# Patient Record
Sex: FEMALE | Race: WHITE | NOT HISPANIC OR LATINO | Employment: FULL TIME | URBAN - METROPOLITAN AREA
[De-identification: names, ages, dates, MRNs, and addresses within clinical notes are randomized per-mention and may not be internally consistent; named-entity substitution may affect disease eponyms.]

---

## 2017-06-12 ENCOUNTER — APPOINTMENT (OUTPATIENT)
Dept: LAB | Facility: CLINIC | Age: 52
End: 2017-06-12
Attending: EMERGENCY MEDICINE

## 2017-06-12 ENCOUNTER — TRANSCRIBE ORDERS (OUTPATIENT)
Dept: URGENT CARE | Facility: CLINIC | Age: 52
End: 2017-06-12

## 2017-06-12 DIAGNOSIS — Z02.1 PHYSICAL EXAM, PRE-EMPLOYMENT: ICD-10-CM

## 2017-06-12 DIAGNOSIS — Z02.1 PHYSICAL EXAM, PRE-EMPLOYMENT: Primary | ICD-10-CM

## 2017-06-12 LAB — RUBV IGG SERPL IA-ACNC: >175 IU/ML

## 2017-06-12 PROCEDURE — 86787 VARICELLA-ZOSTER ANTIBODY: CPT

## 2017-06-12 PROCEDURE — 86765 RUBEOLA ANTIBODY: CPT

## 2017-06-12 PROCEDURE — 86762 RUBELLA ANTIBODY: CPT

## 2017-06-12 PROCEDURE — 86480 TB TEST CELL IMMUN MEASURE: CPT

## 2017-06-12 PROCEDURE — 36415 COLL VENOUS BLD VENIPUNCTURE: CPT

## 2017-06-12 PROCEDURE — 86706 HEP B SURFACE ANTIBODY: CPT

## 2017-06-12 PROCEDURE — 86735 MUMPS ANTIBODY: CPT

## 2017-06-13 LAB
HBV SURFACE AB SER-ACNC: <3.1 MIU/ML
MEV IGG SER QL: NORMAL
MUV IGG SER QL: NORMAL
VZV IGG SER IA-ACNC: NORMAL

## 2017-06-14 LAB
ANNOTATION COMMENT IMP: NORMAL
GAMMA INTERFERON BACKGROUND BLD IA-ACNC: 0.05 IU/ML
M TB IFN-G BLD-IMP: NEGATIVE
M TB IFN-G CD4+ BCKGRND COR BLD-ACNC: 0.14 IU/ML
M TB IFN-G CD4+ T-CELLS BLD-ACNC: 0.19 IU/ML
MITOGEN IGNF BLD-ACNC: 8.53 IU/ML
QUANTIFERON-TB GOLD IN TUBE: NORMAL
SERVICE CMNT-IMP: NORMAL

## 2018-03-09 ENCOUNTER — OFFICE VISIT (OUTPATIENT)
Dept: INTERNAL MEDICINE CLINIC | Facility: CLINIC | Age: 53
End: 2018-03-09
Payer: COMMERCIAL

## 2018-03-09 VITALS
BODY MASS INDEX: 31.22 KG/M2 | TEMPERATURE: 98 F | RESPIRATION RATE: 16 BRPM | SYSTOLIC BLOOD PRESSURE: 120 MMHG | WEIGHT: 210.8 LBS | HEIGHT: 69 IN | HEART RATE: 59 BPM | OXYGEN SATURATION: 98 % | DIASTOLIC BLOOD PRESSURE: 62 MMHG

## 2018-03-09 DIAGNOSIS — M25.60 STIFFNESS OF MULTIPLE JOINTS: ICD-10-CM

## 2018-03-09 DIAGNOSIS — E78.49 OTHER HYPERLIPIDEMIA: Primary | ICD-10-CM

## 2018-03-09 DIAGNOSIS — E89.0 POSTOPERATIVE HYPOTHYROIDISM: ICD-10-CM

## 2018-03-09 DIAGNOSIS — F31.9 BIPOLAR 1 DISORDER (HCC): ICD-10-CM

## 2018-03-09 DIAGNOSIS — Z12.4 SCREENING FOR CERVICAL CANCER: ICD-10-CM

## 2018-03-09 DIAGNOSIS — F31.9 BIPOLAR 1 DISORDER (HCC): Primary | ICD-10-CM

## 2018-03-09 DIAGNOSIS — Z12.11 SCREENING FOR COLON CANCER: ICD-10-CM

## 2018-03-09 DIAGNOSIS — Z12.39 SCREENING FOR BREAST CANCER: ICD-10-CM

## 2018-03-09 PROCEDURE — 99204 OFFICE O/P NEW MOD 45 MIN: CPT | Performed by: NURSE PRACTITIONER

## 2018-03-09 RX ORDER — LAMOTRIGINE 100 MG/1
100 TABLET ORAL
COMMUNITY
End: 2018-04-04 | Stop reason: SDUPTHER

## 2018-03-09 RX ORDER — LEVOTHYROXINE SODIUM 0.1 MG/1
100 TABLET ORAL DAILY
Qty: 30 TABLET | Refills: 1 | Status: SHIPPED | OUTPATIENT
Start: 2018-03-09 | End: 2018-06-21 | Stop reason: SDUPTHER

## 2018-03-09 NOTE — PROGRESS NOTES
Assessment/Plan: Patient to have fasting labs done along with RF and JULEE  She was given a referral for GYN and mammogram   Patient is deferring a colonoscopy but is will to do the FIT testing  Will call Synthroid brand dose into [facility]  Will refer patient to Psychiatry for her refills on her Lamictal for her Bipolar disorder  She is up to date on her eye exams and dental exams  She did have a flu vaccine this flu season  Will follow up in 3 months or sooner  If any issues or concerns please call the office  No problem-specific Assessment & Plan notes found for this encounter  Problem List Items Addressed This Visit     Other hyperlipidemia - Primary    Relevant Orders    CBC and differential    Comprehensive metabolic panel    Lipid panel    Postoperative hypothyroidism    Relevant Medications    levothyroxine (SYNTHROID) 100 mcg tablet    Other Relevant Orders    CBC and differential    Comprehensive metabolic panel    TSH, 3rd generation with T4 reflex    Bipolar 1 disorder (HCC)    Relevant Orders    CBC and differential    Comprehensive metabolic panel    Stiffness of multiple joints    Relevant Orders    CBC and differential    Comprehensive metabolic panel    JULEE Screen w/ Reflex to Titer/Pattern    Rheumatoid Factor      Other Visit Diagnoses     Screening for breast cancer        Relevant Orders    Mammo screening bilateral w 3d & cad    Screening for cervical cancer        Relevant Orders    Ambulatory referral to Obstetrics / Gynecology    Screening for colon cancer        Relevant Orders    Occult Bloood,Fecal Immunochemical            Subjective:      Patient ID: Rosa Chavarria is a 46 y o  female  Sebastian Pantoja is here today to establish care as a new patient  Her last PCO was Dr Sammi Nazario and she was last seen one year ago by him  She states she does have a history of Bipolar disorder and does take Lamictal at bedtime    She did have a total thyroidectomy several years ago and does take Synthroid 100 mcg by mouth daily but does not take the generic only the brand version  She also does have a history of a partial hyster, rotator cuff surgery, hyperlipidemia, and arthritis  She denies any chest pain, SOB, or palpitations  She does work at the hospital as a house keeper [employer]  She is having issues today with pain in her hands and wrists and states they will swell and as the day goes on does get worse with pain  She right now is wearing a brace to her right wrist to help alleviate the pain  She does take Aleve at night which does help her symptoms  She does need a referral for a mammogram, GYN and FIT testing  She is deferring a colonoscopy  She was a previous smoker but has since quit several years ago  She denies any depression or anxiety and states the Lamictal does help her moods  She last had her vision check several years ago and did have Lasik surgery  She is sleeping well at night and denies any other symptoms  She offers no other complaints  The following portions of the patient's history were reviewed and updated as appropriate:   She  has a past medical history of Bipolar 1 disorder ([facility] [address] ); Disease of thyroid gland; and Thyroid cancer ([facility] [address] )  She   Patient Active Problem List    Diagnosis Date Noted    Other hyperlipidemia 03/09/2018    Postoperative hypothyroidism 03/09/2018    Bipolar 1 disorder ([facility] [address] ) 03/09/2018    Stiffness of multiple joints 03/09/2018     She  has a past surgical history that includes Shoulder surgery; Bladder neck reconstruction; Thyroidectomy, partial; Hysterectomy; Bladder suspension; and Rotator cuff repair (Bilateral)  Her family history is not on file  She  reports that she has quit smoking  Her smoking use included Cigarettes  She smoked 1 00 pack per day  She has never used smokeless tobacco  She reports that she does not drink alcohol or use drugs    Current Outpatient Prescriptions   Medication Sig Dispense Refill    lamoTRIgine (LaMICtal) 100 mg tablet Take 100 mg by mouth      levothyroxine (SYNTHROID) 100 mcg tablet Take 1 tablet (100 mcg total) by mouth daily 30 tablet 1     No current facility-administered medications for this visit  Current Outpatient Prescriptions on File Prior to Visit   Medication Sig    [DISCONTINUED] levothyroxine 100 mcg tablet Take 100 mcg by mouth daily    [DISCONTINUED] albuterol (PROVENTIL HFA,VENTOLIN HFA) 90 mcg/act inhaler Inhale 2 puffs every 4 (four) hours as needed for wheezing    [DISCONTINUED] azithromycin (ZITHROMAX) 250 mg tablet Take 1 tablet by mouth daily Take first 2 tablets together, then 1 every day until finished   [DISCONTINUED] fexofenadine (ALLEGRA) 180 MG tablet Take 180 mg by mouth daily   [DISCONTINUED] lamoTRIgine (LaMICtal) 100 mg tablet Take 150 mg by mouth 2 (two) times a day Indications: 150mg in am 100mg in pm     [DISCONTINUED] levothyroxine 100 mcg tablet Take 100 mcg by mouth daily   [DISCONTINUED] naproxen (NAPROSYN) 500 mg tablet Take 1 tablet by mouth 2 (two) times a day with meals     No current facility-administered medications on file prior to visit  She is allergic to demerol [meperidine]; demerol [meperidine]; and latex       Review of Systems   Constitutional: Negative  HENT: Negative  Eyes: Negative  Respiratory: Negative  Cardiovascular: Negative  Gastrointestinal: Negative  Endocrine: Negative  Genitourinary: Negative  Musculoskeletal: Positive for arthralgias, joint swelling and myalgias  Skin: Negative  Allergic/Immunologic: Negative  Neurological: Negative  Hematological: Negative  Psychiatric/Behavioral: Negative            Objective:      /62 (BP Location: Right arm, Patient Position: Sitting, Cuff Size: Large)   Pulse 59   Temp 98 °F (36 7 °C) (Oral)   Resp 16   Ht 5' 9" (1 753 m)   Wt 95 6 kg (210 lb 12 8 oz)   SpO2 98%   BMI 31 13 kg/m²          Physical Exam Constitutional: She is oriented to person, place, and time  She appears well-developed and well-nourished  HENT:   Head: Normocephalic and atraumatic  Right Ear: External ear normal    Left Ear: External ear normal    Nose: Nose normal    Mouth/Throat: Oropharynx is clear and moist    Eyes: Conjunctivae and EOM are normal  Pupils are equal, round, and reactive to light  Neck: Normal range of motion  Neck supple  Cardiovascular: Normal rate, regular rhythm, normal heart sounds and intact distal pulses  Pulmonary/Chest: Effort normal and breath sounds normal    Abdominal: Soft  Bowel sounds are normal    Musculoskeletal: Normal range of motion  Neurological: She is alert and oriented to person, place, and time  She has normal reflexes  Skin: Skin is warm and dry  Psychiatric: She has a normal mood and affect  Her behavior is normal  Judgment and thought content normal    Vitals reviewed

## 2018-03-09 NOTE — PATIENT INSTRUCTIONS
Arthritis   WHAT YOU NEED TO KNOW:   What is arthritis? Arthritis is a disease that causes inflammation in one or more joints  There are many types of arthritis, such as osteoarthritis, rheumatoid arthritis, and septic arthritis  Some types cause inflammation in the joints  Other types wear away the cartilage between joints  This makes the bones of the joint rub together when you move the joint  Your symptoms may be constant, or symptoms may come and go  Arthritis often gets worse over time and can cause permanent joint damage  What increases my risk for arthritis? · A family history of arthritis    · Infection, trauma, or injury to the joint    · Obesity    · A disease such as diabetes, heart disease, hypothyroidism, or psoriasis    · An immune deficiency disorder, such as AIDS or lupus  What are the signs and symptoms of arthritis? · Pain, swelling, or stiffness in the joint    · Limited range of motion in the joint    · Warmth or redness over the joint    · Tenderness when you touch the joint    · Stiff joints in the morning that loosen with movement    · A creaking or grinding sound when you move the joint    · Fever  How is arthritis diagnosed? · Blood tests  are used to measure the amount of inflammation in your body  · An x-ray, CT, MRI, or ultrasound  may be used to check for joint damage, swelling, or loss of bone  Do not enter the MRI room with anything metal  Metal can cause serious damage  Tell the healthcare provider if you have any metal in or on your body  · A sample  of the fluid in the joint may be tested for uric acid or calcium crystals, or for signs of infection  How is arthritis treated? Treatment will depend on the type of arthritis you have and if it is severe  You may need any of the following:  · Acetaminophen  decreases pain and fever  It is available without a doctor's order  Ask how much to take and how often to take it  Follow directions   Acetaminophen can cause liver damage if not taken correctly  · NSAIDs , such as ibuprofen, help decrease swelling, pain, and fever  This medicine is available with or without a doctor's order  NSAIDs can cause stomach bleeding or kidney problems in certain people  If you take blood thinner medicine, always ask your healthcare provider if NSAIDs are safe for you  Always read the medicine label and follow directions  · Steroid medicine  helps reduce swelling and pain  · Surgery  may be needed to repair or replace a damaged joint  What can I do to manage arthritis? · Rest your painful joint so it can heal   Your healthcare provider may recommend crutches or a walker if the affected joint is in a leg  · Apply ice or heat to the joint  Both can help decrease swelling and pain  Ice may also help prevent tissue damage  Use an ice pack, or put crushed ice in a plastic bag  Cover it with a towel and place it on your joint for 15 to 20 minutes every hour or as directed  You can apply heat for 20 minutes every 2 hours  Heat treatment includes hot packs or heat lamps  · Elevate your joint  Elevation helps reduce swelling and pain  Raise your joint above the level of your heart as often as you can  Prop your painful joint on pillows to keep it above your heart comfortably  · Go to therapy as directed  A physical therapist can teach you exercises to improve flexibility and range of motion  You may also be shown non-weight-bearing exercises that are safe for your joints, such as swimming  Exercise can help keep your joints flexible and reduce pain  An occupational therapist can help you learn to do your daily activities when your joints are stiff or sore  · Maintain a healthy weight  Extra weight puts increased pressure on your joints  Ask your healthcare provider what you should weigh   If you need to lose weight, he can help you create a weight loss program  Weight loss can help reduce pain and increase your ability to do your activities  The amount of exercise you do may vary each day, depending on your symptoms  · Wear flat or low-heeled shoes  This will help decrease pain and reduce pressure on your ankle, knee, and hip joints  · Use support devices  You may be given splints to wear on your hands to help your joints rest and to decrease inflammation  While you sleep, use a pillow that is firm enough to support your neck and head  What other equipment should I use? The following may help you move and prevent falls:  · Orthotic shoes or insoles  help support your feet when you walk  · Crutches, a cane, or a walker  may help decrease your risk for falling  They also decrease stress on affected joints  · Devices to prevent falls  include raised toilet seats and bathtub bars to help you get up from sitting  Handrails can be placed in areas where you need balance and support  When should I seek immediate care? · You have a fever and severe joint pain or swelling  · You cannot move the affected joint  · You have severe joint pain you cannot tolerate  When should I contact my healthcare provider? · Your pain or swelling does not get better with treatment  · You have questions or concerns about your condition or care  CARE AGREEMENT:   You have the right to help plan your care  Learn about your health condition and how it may be treated  Discuss treatment options with your caregivers to decide what care you want to receive  You always have the right to refuse treatment  The above information is an  only  It is not intended as medical advice for individual conditions or treatments  Talk to your doctor, nurse or pharmacist before following any medical regimen to see if it is safe and effective for you  © 2017 [employer] Information is for End User's use only and may not be sold, redistributed or otherwise used for commercial purposes   All illustrations and images included in CareNotes® are the copyrighted property of A D A M , Inc  or Yifan Yadav

## 2018-03-12 ENCOUNTER — TELEPHONE (OUTPATIENT)
Dept: INTERNAL MEDICINE CLINIC | Facility: CLINIC | Age: 53
End: 2018-03-12

## 2018-03-17 ENCOUNTER — LAB (OUTPATIENT)
Dept: LAB | Facility: HOSPITAL | Age: 53
End: 2018-03-17
Payer: COMMERCIAL

## 2018-03-17 DIAGNOSIS — E78.49 OTHER HYPERLIPIDEMIA: ICD-10-CM

## 2018-03-17 DIAGNOSIS — F31.9 BIPOLAR 1 DISORDER (HCC): ICD-10-CM

## 2018-03-17 DIAGNOSIS — M25.60 STIFFNESS OF MULTIPLE JOINTS: ICD-10-CM

## 2018-03-17 DIAGNOSIS — E89.0 POSTOPERATIVE HYPOTHYROIDISM: ICD-10-CM

## 2018-03-17 LAB
ALBUMIN SERPL BCP-MCNC: 4 G/DL (ref 3.5–5)
ALP SERPL-CCNC: 62 U/L (ref 46–116)
ALT SERPL W P-5'-P-CCNC: 30 U/L (ref 12–78)
ANION GAP SERPL CALCULATED.3IONS-SCNC: 11 MMOL/L (ref 4–13)
AST SERPL W P-5'-P-CCNC: 19 U/L (ref 5–45)
BASOPHILS # BLD AUTO: 0.06 THOUSANDS/ΜL (ref 0–0.1)
BASOPHILS NFR BLD AUTO: 1 % (ref 0–1)
BILIRUB SERPL-MCNC: 0.6 MG/DL (ref 0.2–1)
BUN SERPL-MCNC: 13 MG/DL (ref 5–25)
CALCIUM SERPL-MCNC: 9.3 MG/DL (ref 8.3–10.1)
CHLORIDE SERPL-SCNC: 105 MMOL/L (ref 100–108)
CHOLEST SERPL-MCNC: 159 MG/DL (ref 50–200)
CO2 SERPL-SCNC: 23 MMOL/L (ref 21–32)
CREAT SERPL-MCNC: 0.68 MG/DL (ref 0.6–1.3)
EOSINOPHIL # BLD AUTO: 0.27 THOUSAND/ΜL (ref 0–0.61)
EOSINOPHIL NFR BLD AUTO: 5 % (ref 0–6)
ERYTHROCYTE [DISTWIDTH] IN BLOOD BY AUTOMATED COUNT: 12.9 % (ref 11.6–15.1)
GFR SERPL CREATININE-BSD FRML MDRD: 99 ML/MIN/1.73SQ M
GLUCOSE P FAST SERPL-MCNC: 94 MG/DL (ref 65–99)
HCT VFR BLD AUTO: 40.7 % (ref 34.8–46.1)
HDLC SERPL-MCNC: 62 MG/DL (ref 40–60)
HGB BLD-MCNC: 14.1 G/DL (ref 11.5–15.4)
LDLC SERPL CALC-MCNC: 84 MG/DL (ref 0–100)
LYMPHOCYTES # BLD AUTO: 1.88 THOUSANDS/ΜL (ref 0.6–4.47)
LYMPHOCYTES NFR BLD AUTO: 37 % (ref 14–44)
MCH RBC QN AUTO: 30.1 PG (ref 26.8–34.3)
MCHC RBC AUTO-ENTMCNC: 34.6 G/DL (ref 31.4–37.4)
MCV RBC AUTO: 87 FL (ref 82–98)
MONOCYTES # BLD AUTO: 0.38 THOUSAND/ΜL (ref 0.17–1.22)
MONOCYTES NFR BLD AUTO: 8 % (ref 4–12)
NEUTROPHILS # BLD AUTO: 2.46 THOUSANDS/ΜL (ref 1.85–7.62)
NEUTS SEG NFR BLD AUTO: 49 % (ref 43–75)
PLATELET # BLD AUTO: 288 THOUSANDS/UL (ref 149–390)
PMV BLD AUTO: 9.3 FL (ref 8.9–12.7)
POTASSIUM SERPL-SCNC: 4.2 MMOL/L (ref 3.5–5.3)
PROT SERPL-MCNC: 6.5 G/DL (ref 6.4–8.2)
RBC # BLD AUTO: 4.68 MILLION/UL (ref 3.81–5.12)
SODIUM SERPL-SCNC: 139 MMOL/L (ref 136–145)
TRIGL SERPL-MCNC: 64 MG/DL
TSH SERPL DL<=0.05 MIU/L-ACNC: 1.96 UIU/ML (ref 0.36–3.74)
WBC # BLD AUTO: 5.05 THOUSAND/UL (ref 4.31–10.16)

## 2018-03-17 PROCEDURE — 36415 COLL VENOUS BLD VENIPUNCTURE: CPT

## 2018-03-17 PROCEDURE — 85025 COMPLETE CBC W/AUTO DIFF WBC: CPT

## 2018-03-17 PROCEDURE — 80053 COMPREHEN METABOLIC PANEL: CPT

## 2018-03-17 PROCEDURE — 86038 ANTINUCLEAR ANTIBODIES: CPT

## 2018-03-17 PROCEDURE — 86430 RHEUMATOID FACTOR TEST QUAL: CPT

## 2018-03-17 PROCEDURE — 84443 ASSAY THYROID STIM HORMONE: CPT

## 2018-03-17 PROCEDURE — 80061 LIPID PANEL: CPT

## 2018-03-19 LAB
RHEUMATOID FACT SER QL LA: NEGATIVE
RYE IGE QN: NEGATIVE

## 2018-03-19 NOTE — PROGRESS NOTES
Can you let Marysol Seek know her blood work did come back normal her rheumatoid factor was negative as well as her other testing

## 2018-04-04 DIAGNOSIS — F31.9 BIPOLAR 1 DISORDER (HCC): Primary | ICD-10-CM

## 2018-04-04 RX ORDER — LAMOTRIGINE 100 MG/1
100 TABLET ORAL
Qty: 90 TABLET | Refills: 0 | Status: SHIPPED | OUTPATIENT
Start: 2018-04-04 | End: 2018-04-23 | Stop reason: SDUPTHER

## 2018-04-12 ENCOUNTER — HOSPITAL ENCOUNTER (OUTPATIENT)
Dept: RADIOLOGY | Facility: HOSPITAL | Age: 53
Discharge: HOME | End: 2018-04-12
Payer: COMMERCIAL

## 2018-04-12 DIAGNOSIS — M25.531 WRIST PAIN, ACUTE, RIGHT: ICD-10-CM

## 2018-04-12 DIAGNOSIS — M25.531 WRIST PAIN, ACUTE, RIGHT: Primary | ICD-10-CM

## 2018-04-12 PROCEDURE — 73110 X-RAY EXAM OF WRIST: CPT

## 2018-04-23 DIAGNOSIS — F31.9 BIPOLAR 1 DISORDER (HCC): ICD-10-CM

## 2018-04-23 RX ORDER — LAMOTRIGINE 100 MG/1
100 TABLET ORAL
Qty: 30 TABLET | Refills: 0 | Status: SHIPPED | OUTPATIENT
Start: 2018-04-23 | End: 2018-04-23 | Stop reason: SDUPTHER

## 2018-04-23 RX ORDER — LAMOTRIGINE 100 MG/1
TABLET ORAL
Qty: 30 TABLET | Refills: 0 | Status: SHIPPED | OUTPATIENT
Start: 2018-04-23 | End: 2018-06-27 | Stop reason: SDUPTHER

## 2018-05-13 ENCOUNTER — HOSPITAL ENCOUNTER (EMERGENCY)
Facility: HOSPITAL | Age: 53
Discharge: HOME | End: 2018-05-13
Attending: EMERGENCY MEDICINE
Payer: OTHER MISCELLANEOUS

## 2018-05-13 VITALS
WEIGHT: 210.76 LBS | SYSTOLIC BLOOD PRESSURE: 120 MMHG | DIASTOLIC BLOOD PRESSURE: 57 MMHG | HEIGHT: 69 IN | TEMPERATURE: 98.1 F | BODY MASS INDEX: 31.22 KG/M2 | RESPIRATION RATE: 18 BRPM | HEART RATE: 60 BPM | OXYGEN SATURATION: 97 %

## 2018-05-13 DIAGNOSIS — S80.12XA: Primary | ICD-10-CM

## 2018-05-13 DIAGNOSIS — S80.12XA TRAUMATIC HEMATOMA OF LEFT LOWER LEG: ICD-10-CM

## 2018-05-13 PROCEDURE — 99283 EMERGENCY DEPT VISIT LOW MDM: CPT

## 2018-05-13 NOTE — ED PROVIDER NOTES
History  Chief Complaint   Patient presents with    Leg Injury     Fire extinguisher fell off the wall on 5th floor [facility] and stuck patients left leg at 1130, pain, bruising, swelling     Pt while working on 5th floor ( )  Was washing hands and the fire extinquisher was knocked off  The wall and hit lateral left thigh, then upper lateral calf and then side of left foot  Occurred about 11:30  Pt know more painful at thigh area and increased bruising at calf  No numbness/tingling or loss of function  Pt is ambulating without asst or distress  No other areas of injury /pain  History provided by:  Patient  Leg Pain   Location:  Hip and leg  Injury: yes    Leg location:  L upper leg and L lower leg  Pain details:     Quality:  Aching and throbbing    Progression:  Worsening  Chronicity:  New  Prior injury to area:  No  Relieved by:  None tried  Associated symptoms: no back pain, no decreased ROM, no fatigue, no fever, no itching, no muscle weakness, no neck pain, no numbness and no tingling    Risk factors: no concern for non-accidental trauma and no recent illness        Prior to Admission Medications   Prescriptions Last Dose Informant Patient Reported? Taking?   lamoTRIgine (LaMICtal) 100 mg tablet   No No   Sig: Take one and one half tablet at bedtime   levothyroxine (SYNTHROID) 100 mcg tablet   No No   Sig: Take 1 tablet (100 mcg total) by mouth daily      Facility-Administered Medications: None       Past Medical History:   Diagnosis Date    Bipolar 1 disorder ([facility] [address] )     Disease of thyroid gland     Thyroid cancer ([facility] [address] )        Past Surgical History:   Procedure Laterality Date    BLADDER NECK RECONSTRUCTION      BLADDER SUSPENSION      HYSTERECTOMY      ROTATOR CUFF REPAIR Bilateral     done twice    SHOULDER SURGERY      THYROIDECTOMY, PARTIAL         History reviewed  No pertinent family history  I have reviewed and agree with the history as documented      Social History   Substance Use Topics    Smoking status: Former Smoker     Packs/day: 1 00     Types: Cigarettes    Smokeless tobacco: Never Used    Alcohol use No        Review of Systems   Constitutional: Negative for activity change, appetite change, chills, fatigue and fever  HENT: Negative  Negative for trouble swallowing and voice change  Eyes: Negative  Respiratory: Negative  Negative for chest tightness and shortness of breath  Cardiovascular: Negative  Negative for chest pain  Gastrointestinal: Negative  Negative for abdominal pain, nausea and vomiting  Genitourinary: Negative  Negative for dysuria, flank pain, hematuria, pelvic pain and urgency  Musculoskeletal: Negative for back pain, joint swelling, neck pain and neck stiffness  Skin: Negative for itching, pallor and rash  Neurological: Negative  Negative for dizziness, tremors, syncope, weakness, light-headedness, numbness and headaches  Psychiatric/Behavioral: Negative  All other systems reviewed and are negative  Physical Exam  ED Triage Vitals [05/13/18 1338]   Temperature Pulse Respirations Blood Pressure SpO2   98 1 °F (36 7 °C) 60 18 120/57 97 %      Temp Source Heart Rate Source Patient Position - Orthostatic VS BP Location FiO2 (%)   Temporal Monitor Sitting Right arm --      Pain Score       2           Orthostatic Vital Signs  Vitals:    05/13/18 1338   BP: 120/57   Pulse: 60   Patient Position - Orthostatic VS: Sitting       Physical Exam   Constitutional: She is oriented to person, place, and time  She appears well-developed and well-nourished  She is active and cooperative  Non-toxic appearance  She does not have a sickly appearance  She does not appear ill  No distress  HENT:   Head: Normocephalic and atraumatic  Head is without raccoon's eyes, without Barrera's sign, without abrasion and without contusion     Right Ear: Hearing normal    Left Ear: Hearing normal    Mouth/Throat: Oropharynx is clear and moist  Mucous membranes are not dry and not cyanotic  No posterior oropharyngeal edema or posterior oropharyngeal erythema  Eyes: Conjunctivae and EOM are normal  Pupils are equal, round, and reactive to light  Neck: Normal range of motion  Neck supple  No JVD present  No spinous process tenderness and no muscular tenderness present  Normal range of motion present  Cardiovascular: Normal rate, regular rhythm, intact distal pulses and normal pulses  No extrasystoles are present  No perf edema or calf tenderness   Pulmonary/Chest: Effort normal  No stridor  No respiratory distress  She has no wheezes  She has no rhonchi  She has no rales  Abdominal: Soft  Bowel sounds are normal  There is no tenderness  There is no rigidity, no guarding and no CVA tenderness  Musculoskeletal:        Right hip: Normal         Left hip: Normal         Right knee: Normal         Left knee: Normal         Right ankle: Normal         Left ankle: Normal         Cervical back: Normal         Thoracic back: Normal         Lumbar back: Normal         Right upper arm: Normal         Left upper arm: Normal         Right forearm: Normal         Left forearm: Normal         Right hand: Normal         Left hand: Normal         Right upper leg: Normal         Left upper leg: She exhibits tenderness  She exhibits no bony tenderness, no swelling, no edema, no deformity and no laceration  Right lower leg: Normal         Left lower leg: She exhibits tenderness  She exhibits no bony tenderness, no edema, no deformity and no laceration  Legs:       Right foot: Normal         Left foot: Normal    Neurological: She is alert and oriented to person, place, and time  She has normal strength and normal reflexes  She displays no tremor  No cranial nerve deficit or sensory deficit  She displays a negative Romberg sign  GCS eye subscore is 4  GCS verbal subscore is 5  GCS motor subscore is 6  Skin: Skin is warm, dry and intact  Bruising noted   No petechiae and no rash noted  She is not diaphoretic  No cyanosis  No pallor  Psychiatric: She has a normal mood and affect  Her speech is normal and behavior is normal  Thought content normal  Cognition and memory are normal    Vitals reviewed  ED Medications  Medications - No data to display    Diagnostic Studies  Results Reviewed     None                 No orders to display              Procedures  Procedures       Phone Contacts  ED Phone Contact    ED Course                               MDM  CritCare Time    Disposition  Final diagnoses:   Contusion of multiple sites of left leg   Traumatic hematoma of left lower leg     Time reflects when diagnosis was documented in both MDM as applicable and the Disposition within this note     Time User Action Codes Description Comment    5/13/2018  2:50 PM Verónica Massey [S80 [id number]] Contusion of multiple sites of left leg     5/14/2018 10:07 AM Verónica Massey [S80 [id number]] Traumatic hematoma of left lower leg       ED Disposition     ED Disposition Condition Comment    Discharge  Littie Jeus discharge to home/self care  Condition at discharge: Good        Follow-up Information     Follow up With Specialties Details Why [address], MD Family [facility]   [address] [place] [address]  [phone]          Discharge Medication List as of 5/13/2018  2:52 PM      CONTINUE these medications which have NOT CHANGED    Details   lamoTRIgine (LaMICtal) 100 mg tablet Take one and one half tablet at bedtime, Normal      levothyroxine (SYNTHROID) 100 mcg tablet Take 1 tablet (100 mcg total) by mouth daily, Starting Fri 3/9/2018, Normal           No discharge procedures on file      ED Provider  Electronically Signed by           Dat Griffin DO  05/14/18 7064

## 2018-05-13 NOTE — DISCHARGE INSTRUCTIONS
Ice therapy as discussed  Use motrin or tylenol or Advil ,etc  As needed  For pain  If uncontrolled / worsening symptoms be rechecked    Contusion in Adults   WHAT [place]:   A contusion is a bruise that appears on your skin after an injury  A bruise happens when small blood vessels tear but skin does not  When blood vessels tear, blood leaks into nearby tissue, such as soft tissue or muscle  DISCHARGE INSTRUCTIONS:   Seek care immediately if:   · You have new trouble moving the injured area  · You have tingling or numbness in or near the injured area  · Your hand or foot below the bruise gets cold or turns pale  Contact your healthcare provider if:   · You find a new lump in the injured area  · Your symptoms do not improve with treatment after 4 to 5 days  · You have questions or concerns about your condition or care  Medicines: You may need any of the following:  · NSAIDs , such as ibuprofen, help decrease swelling, pain, and fever  This medicine is available with or without a doctor's order  NSAIDs can cause stomach bleeding or kidney problems in certain people  If you take blood thinner medicine, always ask your healthcare provider if NSAIDs are safe for you  Always read the medicine label and follow directions  · Prescription pain medicine  may be given  Do not wait until the pain is severe before you take your medicine  · Take your medicine as directed  Contact your healthcare provider if you think your medicine is not helping or if you have side effects  Tell him or her if you are allergic to any medicine  Keep a list of the medicines, vitamins, and herbs you take  Include the amounts, and when and why you take them  Bring the list or the pill bottles to follow-up visits  Carry your medicine list with you in case of an emergency  Follow up with your healthcare provider as directed: You may need to return within a week to check your injury again   Write down your questions so you remember to ask them during your visits  Help a contusion heal:   · Rest the injured area  or use it less than usual  If you bruised your leg or foot, you may need crutches or a cane to help you walk  This will help you keep weight off your injured body part  · Apply ice  to decrease swelling and pain  Ice may also help prevent tissue damage  Use an ice pack, or put crushed ice in a plastic bag  Cover it with a towel and place it on your bruise for 15 to 20 minutes every hour or as directed  · Use compression  to support the area and decrease swelling  Wrap an elastic bandage around the area over the bruised muscle  Make sure the bandage is not too tight  You should be able to fit 1 finger between the bandage and your skin  · Elevate (raise) your injured body part  above the level of your heart to help decrease pain and swelling  Use pillows, blankets, or rolled towels to elevate the area as often as you can  · Do not drink alcohol  as directed  Alcohol may slow healing  · Do not stretch injured muscles  right after your injury  Ask your healthcare provider when and how you may safely stretch after your injury  Gentle stretches can help increase your flexibility  · Do not massage the area or put heating pads  on the bruise right after your injury  Heat and massage may slow healing  Your healthcare provider may tell you to apply heat after several days  At that time, heat will start to help the injury heal   Prevent another contusion:   · Stretch and warm up before you play sports or exercise  · Wear protective gear when you play sports  Examples are shin guards and padding  · If you begin a new physical activity, start slowly to give your body a chance to adjust   © 2017 [address] Information is for End User's use only and may not be sold, redistributed or otherwise used for commercial purposes   All illustrations and images included in CareNotes® are the copyrighted property of [employer]  or Yifan Yadav  The above information is an  only  It is not intended as medical advice for individual conditions or treatments  Talk to your doctor, nurse or pharmacist before following any medical regimen to see if it is safe and effective for you  Hematoma   WHAT YOU NEED TO KNOW:   A hematoma is a collection of blood  A bruise is a type of hematoma  A hematoma may form in a muscle or in the tissues just under the skin  A hematoma that forms under the skin will feel like a bump or hard mass  Hematomas can happen anywhere in your body, including in your brain  Your body may break down and absorb a mild hematoma on its own  A more serious hematoma may need treatment  DISCHARGE INSTRUCTIONS:   Medicines: You may need any of the following:  · Prescription pain medicine  may be given  Ask how to take this medicine safely  · NSAIDs , such as ibuprofen, help decrease swelling, pain, and fever  This medicine is available with or without a doctor's order  NSAIDs can cause stomach bleeding or kidney problems in certain people  If you take blood thinner medicine, always ask your healthcare provider if NSAIDs are safe for you  Always read the medicine label and follow directions  · Antibiotics  prevent or treat a bacterial infection  · Take your medicine as directed  Contact your healthcare provider if you think your medicine is not helping or if you have side effects  Tell him of her if you are allergic to any medicine  Keep a list of the medicines, vitamins, and herbs you take  Include the amounts, and when and why you take them  Bring the list or the pill bottles to follow-up visits  Carry your medicine list with you in case of an emergency  Return to the emergency department if:   · You have new or worsening pain, or pain that does not get better with medicine  · You have a fever      · You have trouble moving the body part that has the hematoma  Contact your healthcare provider if:   · You have questions or concerns about your condition or care  Follow up with your healthcare provider as directed: You may need to have surgery if your hematoma is severe  You may also need other tests to make sure there is no other damage that needs to be treated  Write down your questions so you remember to ask them during your visits  Self-care:   · Rest the area  Rest will help your body heal and will also help prevent more damage  · Apply ice as directed  Ice helps reduce swelling  Ice may also help prevent tissue damage  Use an ice pack, or put crushed ice in a bag  Cover it with a towel  Place it on your hematoma for 20 minutes every hour, or as directed  Ask how many times each day to apply ice, and for how many days  · Compress the injury if possible  Lightly wrap the injury with an elastic or soft bandage  This may help control swelling  Ask your healthcare provider how to wrap your injury properly  · Elevate the area as directed  If possible, raise the area above the level of your heart as often as you can  This will help decrease swelling  · Keep the hematoma covered with a bandage  This will help protect the area while it heals  © 2017 [employer]  Information is for End User's use only and may not be sold, redistributed or otherwise used for commercial purposes  All illustrations and images included in CareNotes® are the copyrighted property of [employer]  or Yifan Yadav  The above information is an  only  It is not intended as medical advice for individual conditions or treatments  Talk to your doctor, nurse or pharmacist before following any medical regimen to see if it is safe and effective for you

## 2018-06-13 ENCOUNTER — OFFICE VISIT (OUTPATIENT)
Dept: INTERNAL MEDICINE CLINIC | Facility: CLINIC | Age: 53
End: 2018-06-13
Payer: COMMERCIAL

## 2018-06-13 VITALS
DIASTOLIC BLOOD PRESSURE: 78 MMHG | HEIGHT: 69 IN | HEART RATE: 52 BPM | SYSTOLIC BLOOD PRESSURE: 118 MMHG | OXYGEN SATURATION: 96 % | WEIGHT: 211 LBS | TEMPERATURE: 97.8 F | BODY MASS INDEX: 31.25 KG/M2

## 2018-06-13 DIAGNOSIS — G89.29 CHRONIC HAND PAIN, RIGHT: ICD-10-CM

## 2018-06-13 DIAGNOSIS — Z12.11 SCREENING FOR COLON CANCER: Primary | ICD-10-CM

## 2018-06-13 DIAGNOSIS — E89.0 POSTOPERATIVE HYPOTHYROIDISM: ICD-10-CM

## 2018-06-13 DIAGNOSIS — M79.641 CHRONIC HAND PAIN, RIGHT: ICD-10-CM

## 2018-06-13 DIAGNOSIS — E78.49 OTHER HYPERLIPIDEMIA: ICD-10-CM

## 2018-06-13 DIAGNOSIS — F31.9 BIPOLAR 1 DISORDER (HCC): ICD-10-CM

## 2018-06-13 DIAGNOSIS — M25.60 STIFFNESS OF MULTIPLE JOINTS: ICD-10-CM

## 2018-06-13 PROCEDURE — 3008F BODY MASS INDEX DOCD: CPT | Performed by: NURSE PRACTITIONER

## 2018-06-13 PROCEDURE — 1036F TOBACCO NON-USER: CPT | Performed by: NURSE PRACTITIONER

## 2018-06-13 PROCEDURE — 99214 OFFICE O/P EST MOD 30 MIN: CPT | Performed by: NURSE PRACTITIONER

## 2018-06-13 NOTE — PROGRESS NOTES
Assessment/Plan: Will refer patient to Orthopedics regarding her chronic right hand pain  Recent XR was negative and symptoms are most likely related to carpal tunnel  Will give script for mammogram, GYN exam, and FIT testing  Recent blood work was normal  BP stable 118/78  Patient is following up with Psychiatry and taking Lamictal daily  Will follow up with her in 6 months with fasting labs or sooner if need be  If any issues or concerns please call the office  No problem-specific Assessment & Plan notes found for this encounter  Problem List Items Addressed This Visit     Other hyperlipidemia    Relevant Orders    Comprehensive metabolic panel    CBC and differential    TSH, 3rd generation with T4 reflex    Lipid panel    Postoperative hypothyroidism    Relevant Orders    Comprehensive metabolic panel    CBC and differential    TSH, 3rd generation with T4 reflex    Bipolar 1 disorder ([facility] [address] )    Relevant Orders    Comprehensive metabolic panel    CBC and differential    TSH, 3rd generation with T4 reflex    Stiffness of multiple joints    Relevant Orders    Comprehensive metabolic panel    CBC and differential    TSH, 3rd generation with T4 reflex      Other Visit Diagnoses     Screening for colon cancer    -  Primary    Chronic hand pain, right        Relevant Orders    Ambulatory referral to Hand Surgery            Subjective:      Patient ID: Zilphia Homans is a 46 y o  female  Faustino Villalbar is here today for a follow up visit  She is doing well today and since her last visit has seen Psychiatry Dr Nimisha Toney and did really like him and is going back on July 11th  She states her right hand is really bothering her still and she did have an XR done back 3 months ago which was negative for any fracture or other acute processes  She does wear a brace on the right wrist which does help  She does have tingling or numbness as well in her right hand    She would like a referral to Orthopedics regarding her continued pain  She denies any chest pain, SOB, or palpitations  She is worried about her weight and states her last doctor was obsessed with her losing weight and she has now become constantly worried about it  She is taking her Synthroid daily  She has yet to have her mammogram, FIT, or GYN exams done  She did have the bone density screening done with [facility] which was normal   She did have her blood work done as well which as essentially normal   She offers no other complaints  The following portions of the patient's history were reviewed and updated as appropriate:   She  has a past medical history of Bipolar 1 disorder ([facility] [address] ); Disease of thyroid gland; and [facility] )  She   Patient Active Problem List    Diagnosis Date Noted    Other hyperlipidemia 03/09/2018    Postoperative hypothyroidism 03/09/2018    Bipolar 1 disorder ([facility] [address] ) 03/09/2018    Stiffness of multiple joints 03/09/2018    Thyroid nodule 07/29/2015     She  has a past surgical history that includes Shoulder surgery; Bladder neck reconstruction; Thyroidectomy, partial; Hysterectomy; Bladder suspension; and Rotator cuff repair (Bilateral)  Her family history is not on file  She  reports that she has quit smoking  Her smoking use included Cigarettes  She smoked 1 00 pack per day  She has never used smokeless tobacco  She reports that she does not drink alcohol or use drugs  Current Outpatient Prescriptions   Medication Sig Dispense Refill    lamoTRIgine (LaMICtal) 100 mg tablet Take one and one half tablet at bedtime 30 tablet 0    levothyroxine (SYNTHROID) 100 mcg tablet Take 1 tablet (100 mcg total) by mouth daily 30 tablet 1     No current facility-administered medications for this visit        Current Outpatient Prescriptions on File Prior to Visit   Medication Sig    lamoTRIgine (LaMICtal) 100 mg tablet Take one and one half tablet at bedtime    levothyroxine (SYNTHROID) 100 mcg tablet Take 1 tablet (100 mcg total) by mouth daily     No current facility-administered medications on file prior to visit  She is allergic to demerol [meperidine] and latex       Review of Systems   Constitutional: Negative  HENT: Negative  Eyes: Negative  Respiratory: Negative  Cardiovascular: Negative  Gastrointestinal: Negative  Endocrine: Negative  Genitourinary: Negative  Musculoskeletal:        Right hand pain   Skin: Negative  Allergic/Immunologic: Negative  Neurological: Negative  Hematological: Negative  Psychiatric/Behavioral: Negative  Below is the patient's most recent value for Albumin, ALT, AST, BUN, Calcium, Chloride, Cholesterol, CO2, Creatinine, GFR, Glucose, HDL, Hematocrit, Hemoglobin, Hemoglobin A1C, LDL, Magnesium, Phosphorus, Platelets, Potassium, PSA, Sodium, Triglycerides, and WBC  Lab Results   Component Value Date    ALT 30 03/17/2018    AST 19 03/17/2018    BUN 13 03/17/2018    CALCIUM 9 3 03/17/2018     03/17/2018    CHOL 159 03/17/2018    CO2 23 03/17/2018    CREATININE 0 68 03/17/2018    HDL 62 (H) 03/17/2018    HCT 40 7 03/17/2018    HGB 14 1 03/17/2018     03/17/2018    K 4 2 03/17/2018     03/17/2018    TRIG 64 03/17/2018    WBC 5 05 03/17/2018     Note: for a comprehensive list of the patient's lab results, access the Results Review activity  Objective:      /78 (BP Location: Right arm, Patient Position: Sitting, Cuff Size: Adult)   Pulse (!) 52   Temp 97 8 °F (36 6 °C) (Temporal)   Ht 5' 9" (1 753 m)   Wt 95 7 kg (211 lb)   SpO2 96%   BMI 31 16 kg/m²          Physical Exam   Constitutional: She is oriented to person, place, and time  She appears well-developed and well-nourished  HENT:   Head: Normocephalic and atraumatic     Right Ear: External ear normal    Left Ear: External ear normal    Nose: Nose normal    Mouth/Throat: Oropharynx is clear and moist    Eyes: Conjunctivae and EOM are normal  Pupils are equal, round, and reactive to light  Neck: Normal range of motion  Neck supple  Cardiovascular: Normal rate, regular rhythm, normal heart sounds and intact distal pulses  Pulmonary/Chest: Effort normal and breath sounds normal    Abdominal: Soft  Bowel sounds are normal    Musculoskeletal: Normal range of motion  Neurological: She is alert and oriented to person, place, and time  She has normal reflexes  Skin: Skin is warm and dry  Psychiatric: She has a normal mood and affect  Her behavior is normal  Judgment and thought content normal    Vitals reviewed

## 2018-06-21 DIAGNOSIS — E89.0 POSTOPERATIVE HYPOTHYROIDISM: ICD-10-CM

## 2018-06-21 RX ORDER — LEVOTHYROXINE SODIUM 0.1 MG/1
100 TABLET ORAL DAILY
Qty: 30 TABLET | Refills: 0 | Status: SHIPPED | OUTPATIENT
Start: 2018-06-21 | End: 2018-08-13 | Stop reason: SDUPTHER

## 2018-06-27 DIAGNOSIS — F31.9 BIPOLAR 1 DISORDER (HCC): ICD-10-CM

## 2018-06-27 RX ORDER — LAMOTRIGINE 100 MG/1
TABLET ORAL
Qty: 30 TABLET | Refills: 0 | Status: SHIPPED | OUTPATIENT
Start: 2018-06-27 | End: 2019-02-13 | Stop reason: DRUGHIGH

## 2018-07-30 ENCOUNTER — TRANSCRIBE ORDERS (OUTPATIENT)
Dept: ADMINISTRATIVE | Facility: HOSPITAL | Age: 53
End: 2018-07-30

## 2018-07-30 ENCOUNTER — APPOINTMENT (OUTPATIENT)
Dept: LAB | Facility: HOSPITAL | Age: 53
End: 2018-07-30

## 2018-07-30 DIAGNOSIS — Z00.8 HEALTH EXAMINATION IN POPULATION SURVEY: ICD-10-CM

## 2018-07-30 DIAGNOSIS — Z00.8 HEALTH EXAMINATION IN POPULATION SURVEY: Primary | ICD-10-CM

## 2018-07-30 LAB
CHOLEST SERPL-MCNC: 177 MG/DL (ref 50–200)
EST. AVERAGE GLUCOSE BLD GHB EST-MCNC: 108 MG/DL
HBA1C MFR BLD: 5.4 % (ref 4.2–6.3)
HDLC SERPL-MCNC: 58 MG/DL (ref 40–60)
LDLC SERPL CALC-MCNC: 100 MG/DL (ref 0–100)
NONHDLC SERPL-MCNC: 119 MG/DL
TRIGL SERPL-MCNC: 96 MG/DL

## 2018-07-30 PROCEDURE — 83036 HEMOGLOBIN GLYCOSYLATED A1C: CPT

## 2018-07-30 PROCEDURE — 36415 COLL VENOUS BLD VENIPUNCTURE: CPT

## 2018-07-30 PROCEDURE — 80061 LIPID PANEL: CPT

## 2018-08-13 DIAGNOSIS — E89.0 POSTOPERATIVE HYPOTHYROIDISM: ICD-10-CM

## 2018-08-13 RX ORDER — LEVOTHYROXINE SODIUM 0.1 MG/1
100 TABLET ORAL DAILY
Qty: 90 TABLET | Refills: 0 | Status: SHIPPED | OUTPATIENT
Start: 2018-08-13 | End: 2018-11-11 | Stop reason: SDUPTHER

## 2018-08-13 NOTE — TELEPHONE ENCOUNTER
Received a call from Priscella Gamma for Kirill Mimi requesting a refill on brand synthroid 100mcg, 90 day  Verified sig and pharmacy  Aware of 48 hr fill policy  Please advise

## 2018-11-11 DIAGNOSIS — E89.0 POSTOPERATIVE HYPOTHYROIDISM: ICD-10-CM

## 2018-11-12 RX ORDER — LEVOTHYROXINE SODIUM 100 MCG
TABLET ORAL
Qty: 90 TABLET | Refills: 0 | Status: SHIPPED | OUTPATIENT
Start: 2018-11-12 | End: 2019-02-13 | Stop reason: SDUPTHER

## 2018-11-29 ENCOUNTER — TELEPHONE (OUTPATIENT)
Dept: INTERNAL MEDICINE CLINIC | Facility: CLINIC | Age: 53
End: 2018-11-29

## 2018-11-29 NOTE — TELEPHONE ENCOUNTER
Patient called back  She is following up with us on 12/14/18  She will try and get her mammogram done prior to this appointment  She would like to do the cologuard if covered by her insurance, if not she will do the FIT test  Esther White will look into this and will place correct orders

## 2018-11-29 NOTE — TELEPHONE ENCOUNTER
Left msg for patient to call back office  Would like to discuss her HM topics- she has not completed any of the ordered testing

## 2018-12-20 ENCOUNTER — LAB (OUTPATIENT)
Dept: LAB | Facility: HOSPITAL | Age: 53
End: 2018-12-20
Payer: COMMERCIAL

## 2018-12-20 DIAGNOSIS — F31.9 BIPOLAR 1 DISORDER (HCC): ICD-10-CM

## 2018-12-20 DIAGNOSIS — E78.49 OTHER HYPERLIPIDEMIA: ICD-10-CM

## 2018-12-20 DIAGNOSIS — E89.0 POSTOPERATIVE HYPOTHYROIDISM: ICD-10-CM

## 2018-12-20 DIAGNOSIS — M25.60 STIFFNESS OF MULTIPLE JOINTS: ICD-10-CM

## 2018-12-20 LAB
ALBUMIN SERPL BCP-MCNC: 4 G/DL (ref 3.5–5)
ALP SERPL-CCNC: 63 U/L (ref 46–116)
ALT SERPL W P-5'-P-CCNC: 30 U/L (ref 12–78)
ANION GAP SERPL CALCULATED.3IONS-SCNC: 8 MMOL/L (ref 4–13)
AST SERPL W P-5'-P-CCNC: 17 U/L (ref 5–45)
BASOPHILS # BLD AUTO: 0.06 THOUSANDS/ΜL (ref 0–0.1)
BASOPHILS NFR BLD AUTO: 1 % (ref 0–1)
BILIRUB SERPL-MCNC: 0.3 MG/DL (ref 0.2–1)
BUN SERPL-MCNC: 10 MG/DL (ref 5–25)
CALCIUM SERPL-MCNC: 9 MG/DL (ref 8.3–10.1)
CHLORIDE SERPL-SCNC: 105 MMOL/L (ref 100–108)
CHOLEST SERPL-MCNC: 163 MG/DL (ref 50–200)
CO2 SERPL-SCNC: 26 MMOL/L (ref 21–32)
CREAT SERPL-MCNC: 0.76 MG/DL (ref 0.6–1.3)
EOSINOPHIL # BLD AUTO: 0.22 THOUSAND/ΜL (ref 0–0.61)
EOSINOPHIL NFR BLD AUTO: 3 % (ref 0–6)
ERYTHROCYTE [DISTWIDTH] IN BLOOD BY AUTOMATED COUNT: 12.7 % (ref 11.6–15.1)
GFR SERPL CREATININE-BSD FRML MDRD: 89 ML/MIN/1.73SQ M
GLUCOSE SERPL-MCNC: 84 MG/DL (ref 65–140)
HCT VFR BLD AUTO: 40.2 % (ref 34.8–46.1)
HDLC SERPL-MCNC: 58 MG/DL (ref 40–60)
HGB BLD-MCNC: 13.2 G/DL (ref 11.5–15.4)
IMM GRANULOCYTES # BLD AUTO: 0.02 THOUSAND/UL (ref 0–0.2)
IMM GRANULOCYTES NFR BLD AUTO: 0 % (ref 0–2)
LDLC SERPL CALC-MCNC: 89 MG/DL (ref 0–100)
LYMPHOCYTES # BLD AUTO: 2.56 THOUSANDS/ΜL (ref 0.6–4.47)
LYMPHOCYTES NFR BLD AUTO: 37 % (ref 14–44)
MCH RBC QN AUTO: 29.5 PG (ref 26.8–34.3)
MCHC RBC AUTO-ENTMCNC: 32.8 G/DL (ref 31.4–37.4)
MCV RBC AUTO: 90 FL (ref 82–98)
MONOCYTES # BLD AUTO: 0.51 THOUSAND/ΜL (ref 0.17–1.22)
MONOCYTES NFR BLD AUTO: 7 % (ref 4–12)
NEUTROPHILS # BLD AUTO: 3.64 THOUSANDS/ΜL (ref 1.85–7.62)
NEUTS SEG NFR BLD AUTO: 52 % (ref 43–75)
NONHDLC SERPL-MCNC: 105 MG/DL
NRBC BLD AUTO-RTO: 0 /100 WBCS
PLATELET # BLD AUTO: 316 THOUSANDS/UL (ref 149–390)
PMV BLD AUTO: 9.2 FL (ref 8.9–12.7)
POTASSIUM SERPL-SCNC: 4.3 MMOL/L (ref 3.5–5.3)
PROT SERPL-MCNC: 6.6 G/DL (ref 6.4–8.2)
RBC # BLD AUTO: 4.47 MILLION/UL (ref 3.81–5.12)
SODIUM SERPL-SCNC: 139 MMOL/L (ref 136–145)
TRIGL SERPL-MCNC: 80 MG/DL
TSH SERPL DL<=0.05 MIU/L-ACNC: 1.18 UIU/ML (ref 0.36–3.74)
WBC # BLD AUTO: 7.01 THOUSAND/UL (ref 4.31–10.16)

## 2018-12-20 PROCEDURE — 85025 COMPLETE CBC W/AUTO DIFF WBC: CPT

## 2018-12-20 PROCEDURE — 84443 ASSAY THYROID STIM HORMONE: CPT

## 2018-12-20 PROCEDURE — 36415 COLL VENOUS BLD VENIPUNCTURE: CPT

## 2018-12-20 PROCEDURE — 80061 LIPID PANEL: CPT

## 2018-12-20 PROCEDURE — 80053 COMPREHEN METABOLIC PANEL: CPT

## 2019-02-13 ENCOUNTER — OFFICE VISIT (OUTPATIENT)
Dept: INTERNAL MEDICINE CLINIC | Facility: CLINIC | Age: 54
End: 2019-02-13
Payer: COMMERCIAL

## 2019-02-13 ENCOUNTER — TELEPHONE (OUTPATIENT)
Dept: INTERNAL MEDICINE CLINIC | Facility: CLINIC | Age: 54
End: 2019-02-13

## 2019-02-13 VITALS
SYSTOLIC BLOOD PRESSURE: 116 MMHG | TEMPERATURE: 97.8 F | BODY MASS INDEX: 31.5 KG/M2 | HEART RATE: 69 BPM | HEIGHT: 69 IN | OXYGEN SATURATION: 97 % | WEIGHT: 212.7 LBS | DIASTOLIC BLOOD PRESSURE: 78 MMHG

## 2019-02-13 DIAGNOSIS — L98.499 CALLOUS ULCER, UNSPECIFIED ULCER STAGE (HCC): ICD-10-CM

## 2019-02-13 DIAGNOSIS — Z12.11 SCREENING FOR COLON CANCER: ICD-10-CM

## 2019-02-13 DIAGNOSIS — E89.0 POSTOPERATIVE HYPOTHYROIDISM: ICD-10-CM

## 2019-02-13 DIAGNOSIS — Z78.0 POSTMENOPAUSAL: ICD-10-CM

## 2019-02-13 DIAGNOSIS — Z12.4 SCREENING FOR CERVICAL CANCER: ICD-10-CM

## 2019-02-13 DIAGNOSIS — K59.09 OTHER CONSTIPATION: ICD-10-CM

## 2019-02-13 DIAGNOSIS — F31.9 BIPOLAR 1 DISORDER (HCC): Primary | ICD-10-CM

## 2019-02-13 DIAGNOSIS — Z11.59 NEED FOR HEPATITIS C SCREENING TEST: ICD-10-CM

## 2019-02-13 DIAGNOSIS — E78.2 MIXED HYPERLIPIDEMIA: ICD-10-CM

## 2019-02-13 DIAGNOSIS — Z12.39 SCREENING FOR BREAST CANCER: ICD-10-CM

## 2019-02-13 PROBLEM — E78.49 OTHER HYPERLIPIDEMIA: Status: RESOLVED | Noted: 2018-03-09 | Resolved: 2019-02-13

## 2019-02-13 PROCEDURE — 99214 OFFICE O/P EST MOD 30 MIN: CPT | Performed by: NURSE PRACTITIONER

## 2019-02-13 PROCEDURE — 3008F BODY MASS INDEX DOCD: CPT | Performed by: NURSE PRACTITIONER

## 2019-02-13 RX ORDER — LAMOTRIGINE 150 MG/1
150 TABLET ORAL DAILY
COMMUNITY

## 2019-02-13 RX ORDER — POLYETHYLENE GLYCOL 3350 17 G/17G
POWDER, FOR SOLUTION ORAL
Qty: 527 G | Refills: 1 | Status: SHIPPED | OUTPATIENT
Start: 2019-02-13 | End: 2019-08-19

## 2019-02-13 RX ORDER — LEVOTHYROXINE SODIUM 100 MCG
100 TABLET ORAL DAILY
Qty: 90 TABLET | Refills: 2 | Status: SHIPPED | OUTPATIENT
Start: 2019-02-13 | End: 2019-05-10 | Stop reason: SDUPTHER

## 2019-02-13 NOTE — PROGRESS NOTES
Assessment/Plan: Will give referrals for GYN, bone density, mammogram and FIT test   She did have her Flu vaccine with work  Will call in Miralax to use daily with juice or water for her constipation  Will refer to Podiatry do to her continued foot pain  Will repeat fasting labs again in 6 months  Labs in December are within normal limits  She is following up with Psychiatry for her Bipolar disorder  She is tolerating the Lamictal   She was advised if any issues or concerns to please call the office  No problem-specific Assessment & Plan notes found for this encounter  Problem List Items Addressed This Visit        Endocrine    Postoperative hypothyroidism    Relevant Medications    SYNTHROID 100 MCG tablet    Other Relevant Orders    Comprehensive metabolic panel    CBC and differential    TSH, 3rd generation with Free T4 reflex       Musculoskeletal and Integument    Callous ulcer ([facility] [address] )    Relevant Orders    Ambulatory referral to Podiatry       Other    Bipolar 1 disorder ([facility] ) - Primary    Relevant Orders    Comprehensive metabolic panel    CBC and differential    TSH, 3rd generation with Free T4 reflex    Mixed hyperlipidemia    Relevant Orders    Comprehensive metabolic panel    CBC and differential    TSH, 3rd generation with Free T4 reflex    Lipid panel    Screening for colon cancer    Relevant Orders    Occult Blood, Fecal Immunochemical    Need for hepatitis C screening test    Relevant Orders    Hepatitis C antibody    Screening for breast cancer    Relevant Orders    Mammo screening bilateral w 3d & cad    Other constipation    Postmenopausal    Relevant Orders    DXA bone density spine hip and pelvis    Screening for cervical cancer    Relevant Orders    Ambulatory referral to Obstetrics / Gynecology            Subjective:      Patient ID: Samantha Sims is a 53 y o  female  Jose Roberto James is here today for a follow up visit   She states she is doing well and is loving her Lamictal and is feeling great on this  Her dose now is 150 daily  She does follow up with [facility]  She is due for a mammogram, bone density, FIT, and GYN exams  She states she did not have time to get these done but will go in the next upcoming months for this  She states she is having some constipation and some days will go no problem then others will have to take a laxative in order to go  She denies any chest pain, SOB, or palpitations  She states she is trying to watch her weight and is trying to loose some weight  She states she is having a calloused area between her 4th and 5th toes and when she is walking the pain is getting worse  She states when she presses on the area it hurts as well  She does stand on her feet during the day  She offers no other issues  The following portions of the patient's history were reviewed and updated as appropriate:   She  has a past medical history of Bipolar 1 disorder ([facility] [address] ), Disease of thyroid gland, and Thyroid cancer ([facility] )  She   Patient Active Problem List    Diagnosis Date Noted    Mixed hyperlipidemia 02/13/2019    Screening for colon cancer 02/13/2019    Need for hepatitis C screening test 02/13/2019    Screening for breast cancer 02/13/2019    Other constipation 02/13/2019    Postmenopausal 02/13/2019    Screening for cervical cancer 02/13/2019    Callous ulcer ([facility] [address] ) 02/13/2019    Postoperative hypothyroidism 03/09/2018    B[facility] 03/09/2018    Stiffness of multiple joints 03/09/2018    Thyroid nodule 07/29/2015     She  has a past surgical history that includes Shoulder surgery; Bladder neck reconstruction; Thyroidectomy, partial; Hysterectomy; Bladder suspension; and Rotator cuff repair (Bilateral)  Her family history is not on file  She  reports that she has quit smoking  Her smoking use included cigarettes  She smoked 1 00 pack per day   She has never used smokeless tobacco  She reports that she does not drink alcohol or use drugs   Current Outpatient Medications   Medication Sig Dispense Refill    lamoTRIgine (LaMICtal) 150 MG tablet Take 150 mg by mouth daily      SYNTHROID 100 MCG tablet Take 1 tablet (100 mcg total) by mouth daily 90 tablet 2     No current facility-administered medications for this visit  Current Outpatient Medications on File Prior to Visit   Medication Sig    lamoTRIgine (LaMICtal) 150 MG tablet Take 150 mg by mouth daily    [DISCONTINUED] lamoTRIgine (LaMICtal) 100 mg tablet Take one and one half tablet at bedtime    [DISCONTINUED] SYNTHROID 100 MCG tablet TAKE ONE TABLET BY MOUTH EVERY DAY     No current facility-administered medications on file prior to visit  She is allergic to demerol [meperidine] and latex       Review of Systems   Constitutional: Negative  HENT: Negative  Eyes: Negative  Respiratory: Negative  Cardiovascular: Negative  Gastrointestinal: Positive for constipation  Endocrine: Negative  Genitourinary: Negative  Musculoskeletal:        Foot pain   Skin: Negative  Allergic/Immunologic: Negative  Neurological: Negative  Hematological: Negative  Psychiatric/Behavioral: Negative  Objective:      /78 (BP Location: Right arm, Patient Position: Sitting, Cuff Size: Adult)   Pulse 69   Temp 97 8 °F (36 6 °C) (Temporal)   Ht 5' 9" (1 753 m)   Wt 96 5 kg (212 lb 11 2 oz)   SpO2 97%   BMI 31 41 kg/m²          Physical Exam   Constitutional: She is oriented to person, place, and time  She appears well-developed and well-nourished  HENT:   Head: Normocephalic and atraumatic  Right Ear: External ear normal    Left Ear: External ear normal    Mouth/Throat: Oropharynx is clear and moist    Eyes: Pupils are equal, round, and reactive to light  Conjunctivae and EOM are normal    Neck: Normal range of motion  Neck supple  Cardiovascular: Normal rate, regular rhythm, normal heart sounds and intact distal pulses     Pulmonary/Chest: Effort normal and breath sounds normal    Abdominal: Soft  Bowel sounds are normal    Musculoskeletal: Normal range of motion  Neurological: She is alert and oriented to person, place, and time  Skin: Skin is warm and dry  Capillary refill takes less than 2 seconds  Calloused area noted between right 4th and 5th toes   Psychiatric: She has a normal mood and affect  Her behavior is normal  Judgment and thought content normal    Vitals reviewed

## 2019-02-13 NOTE — TELEPHONE ENCOUNTER
[address] [facility] called the office and stated that we had just prescribed Glycolax but they don't have it available  They were wondering if it was okay to switch to generic gabalax? Per our conversation that yes it is okay  Pharmacist was informed that it was okay to make switch

## 2019-02-26 ENCOUNTER — HOSPITAL ENCOUNTER (OUTPATIENT)
Dept: BONE DENSITY | Facility: HOSPITAL | Age: 54
Discharge: HOME | End: 2019-02-26
Payer: COMMERCIAL

## 2019-02-26 ENCOUNTER — HOSPITAL ENCOUNTER (OUTPATIENT)
Dept: MAMMOGRAPHY | Facility: HOSPITAL | Age: 54
Discharge: HOME | End: 2019-02-26
Payer: COMMERCIAL

## 2019-02-26 VITALS — BODY MASS INDEX: 31.4 KG/M2 | WEIGHT: 212 LBS | HEIGHT: 69 IN

## 2019-02-26 DIAGNOSIS — Z78.0 POSTMENOPAUSAL: ICD-10-CM

## 2019-02-26 DIAGNOSIS — Z12.39 SCREENING FOR BREAST CANCER: ICD-10-CM

## 2019-02-26 PROCEDURE — 77067 SCR MAMMO BI INCL CAD: CPT

## 2019-02-26 PROCEDURE — 77063 BREAST TOMOSYNTHESIS BI: CPT

## 2019-02-26 PROCEDURE — 77080 DXA BONE DENSITY AXIAL: CPT

## 2019-03-07 ENCOUNTER — HOSPITAL ENCOUNTER (OUTPATIENT)
Dept: MAMMOGRAPHY | Facility: HOSPITAL | Age: 54
Discharge: HOME | End: 2019-03-07
Payer: COMMERCIAL

## 2019-03-07 ENCOUNTER — HOSPITAL ENCOUNTER (OUTPATIENT)
Dept: ULTRASOUND IMAGING | Facility: HOSPITAL | Age: 54
Discharge: HOME | End: 2019-03-07
Payer: COMMERCIAL

## 2019-03-07 VITALS — HEIGHT: 69 IN | BODY MASS INDEX: 31.4 KG/M2 | WEIGHT: 212 LBS

## 2019-03-07 DIAGNOSIS — R92.8 ABNORMAL MAMMOGRAM: ICD-10-CM

## 2019-03-07 PROCEDURE — 77065 DX MAMMO INCL CAD UNI: CPT

## 2019-03-07 PROCEDURE — G0279 TOMOSYNTHESIS, MAMMO: HCPCS

## 2019-03-07 PROCEDURE — 76642 ULTRASOUND BREAST LIMITED: CPT

## 2019-03-25 ENCOUNTER — TELEPHONE (OUTPATIENT)
Dept: INTERNAL MEDICINE CLINIC | Facility: CLINIC | Age: 54
End: 2019-03-25

## 2019-03-25 DIAGNOSIS — E04.1 THYROID NODULE: Primary | ICD-10-CM

## 2019-03-25 NOTE — TELEPHONE ENCOUNTER
Patient did tell my she had a total thyroidectomy and after reviewing old notes she did have part of her thyroid removed around 20 years ago and her previous doctor was not sure if this was due to malignancy or not   Will put Us in the system

## 2019-03-25 NOTE — TELEPHONE ENCOUNTER
Received a call from Naomi Patel stating that she was diagnosed sometime the beginning of 2015 with thyroid cancer  Did see past information about thyroid, and she had met with a general surgeon on 7/29/2015 and declined further testing due to potential side effects  She stated that she completely forgot about it until recently, and decided that she would like to have it checked out  Did speak to Dr Lynda Keane regarding this, and she stated that we can place an order for an US of the thyroid  Ramin Mancera and she will call to schedule  Please place thyroid us order, was unsure what order to place

## 2019-03-28 ENCOUNTER — HOSPITAL ENCOUNTER (OUTPATIENT)
Dept: ULTRASOUND IMAGING | Facility: HOSPITAL | Age: 54
Discharge: HOME | End: 2019-03-28
Payer: COMMERCIAL

## 2019-03-28 DIAGNOSIS — E04.1 THYROID NODULE: ICD-10-CM

## 2019-03-28 PROCEDURE — 76536 US EXAM OF HEAD AND NECK: CPT

## 2019-05-09 DIAGNOSIS — E89.0 POSTOPERATIVE HYPOTHYROIDISM: ICD-10-CM

## 2019-05-09 RX ORDER — LEVOTHYROXINE SODIUM 100 MCG
100 TABLET ORAL DAILY
Qty: 90 TABLET | Refills: 0 | Status: CANCELLED | OUTPATIENT
Start: 2019-05-09

## 2019-05-10 DIAGNOSIS — E89.0 POSTOPERATIVE HYPOTHYROIDISM: ICD-10-CM

## 2019-05-10 RX ORDER — LEVOTHYROXINE SODIUM 100 MCG
100 TABLET ORAL DAILY
Qty: 90 TABLET | Refills: 2 | Status: SHIPPED | OUTPATIENT
Start: 2019-05-10 | End: 2019-05-10 | Stop reason: SDUPTHER

## 2019-05-10 RX ORDER — LEVOTHYROXINE SODIUM 100 MCG
100 TABLET ORAL DAILY
Qty: 90 TABLET | Refills: 2 | Status: SHIPPED | OUTPATIENT
Start: 2019-05-10 | End: 2020-02-08 | Stop reason: SDUPTHER

## 2019-08-09 ENCOUNTER — TELEPHONE (OUTPATIENT)
Dept: INTERNAL MEDICINE CLINIC | Facility: CLINIC | Age: 54
End: 2019-08-09

## 2019-08-19 ENCOUNTER — OFFICE VISIT (OUTPATIENT)
Dept: INTERNAL MEDICINE CLINIC | Facility: CLINIC | Age: 54
End: 2019-08-19
Payer: COMMERCIAL

## 2019-08-19 VITALS
RESPIRATION RATE: 14 BRPM | DIASTOLIC BLOOD PRESSURE: 70 MMHG | TEMPERATURE: 97.9 F | HEART RATE: 81 BPM | OXYGEN SATURATION: 96 % | HEIGHT: 69 IN | SYSTOLIC BLOOD PRESSURE: 118 MMHG | BODY MASS INDEX: 31.83 KG/M2 | WEIGHT: 214.9 LBS

## 2019-08-19 DIAGNOSIS — E89.0 POSTOPERATIVE HYPOTHYROIDISM: ICD-10-CM

## 2019-08-19 DIAGNOSIS — Z12.4 SCREENING FOR CERVICAL CANCER: ICD-10-CM

## 2019-08-19 DIAGNOSIS — E78.2 MIXED HYPERLIPIDEMIA: Primary | ICD-10-CM

## 2019-08-19 DIAGNOSIS — Z87.891 FORMER SMOKER: ICD-10-CM

## 2019-08-19 DIAGNOSIS — Z12.11 SCREENING FOR COLON CANCER: ICD-10-CM

## 2019-08-19 DIAGNOSIS — F31.9 BIPOLAR 1 DISORDER (HCC): ICD-10-CM

## 2019-08-19 PROBLEM — Z11.59 NEED FOR HEPATITIS C SCREENING TEST: Status: RESOLVED | Noted: 2019-02-13 | Resolved: 2019-08-19

## 2019-08-19 PROBLEM — Z12.39 SCREENING FOR BREAST CANCER: Status: RESOLVED | Noted: 2019-02-13 | Resolved: 2019-08-19

## 2019-08-19 PROCEDURE — 1036F TOBACCO NON-USER: CPT | Performed by: NURSE PRACTITIONER

## 2019-08-19 PROCEDURE — 3008F BODY MASS INDEX DOCD: CPT | Performed by: NURSE PRACTITIONER

## 2019-08-19 PROCEDURE — 99213 OFFICE O/P EST LOW 20 MIN: CPT | Performed by: NURSE PRACTITIONER

## 2019-08-19 NOTE — PATIENT INSTRUCTIONS

## 2019-08-19 NOTE — PROGRESS NOTES
Assessment/Plan: Screenings up to date will give referral to GYN and Cologuard  BP stable 118/70  Will repeat fasting labs  She will call regarding Pneumonia vaccine and does get Flu vaccine with work  She does work at the hospital  She is following up with [facility] and is tolerating her Lamictal well  Will bring back in one year or sooner if need be  No problem-specific Assessment & Plan notes found for this encounter  Problem List Items Addressed This Visit        Endocrine    Postoperative hypothyroidism       Other    Bipolar 1 disorder ([facility] [address] )    Mixed hyperlipidemia - Primary    BMI 31 0-31 9,adult      Other Visit Diagnoses     Screening for cervical cancer        Relevant Orders    Ambulatory referral to Obstetrics / Gynecology    Screening for colon cancer        Relevant Orders    Cologuard            Subjective:      Patient ID: Roderick Lu is a 53 y o  female  Marcia Go is here today for a follow up visit  She is doing well today and offers no new issues  She is up to date on her screenings and does need to make a GYN exam and needs to have a repeat mammogram on her right breast  She was having some drainage from the left breast but this has since stopped and is not having any other symptoms  She did not have her labs done and will get these done in a couple days  She denies any chest pain, SOB, or palpitations  She is seeing [facility] and is taking Lamictal  She is a former smoker and will consider getting the Pneumonia vaccine  She denies any other issues  She states she is willing to do Cologuard  She offers no other issues  The following portions of the patient's history were reviewed and updated as appropriate:   She  has a past medical history of Bipolar 1 disorder ([facility] [address] ), Disease of thyroid gland, and Thyroid cancer ([facility] [address] )    She   Patient Active Problem List    Diagnosis Date Noted    BMI 31 0-31 9,adult 08/19/2019    Mixed hyperlipidemia 02/13/2019    Other constipation 02/13/2019    Postmenopausal 02/13/2019    Callous ulcer ([facility] ) 02/13/2019    Postoperative hypothyroidism 03/09/2018    Bipolar 1 disorder ([facility] ) 03/09/2018    Stiffness of multiple joints 03/09/2018    Thyroid nodule 07/29/2015     She  has a past surgical history that includes Shoulder surgery; Bladder neck reconstruction; Thyroidectomy, partial; Hysterectomy; Bladder suspension; and Rotator cuff repair (Bilateral)  Her family history is not on file  She  reports that she has quit smoking  Her smoking use included cigarettes  She smoked 1 00 pack per day  She has never used smokeless tobacco  She reports that she does not drink alcohol or use drugs  Current Outpatient Medications   Medication Sig Dispense Refill    lamoTRIgine (LaMICtal) 150 MG tablet Take 150 mg by mouth daily      SYNTHROID 100 MCG tablet Take 1 tablet (100 mcg total) by mouth daily 90 tablet 2     No current facility-administered medications for this visit  Current Outpatient Medications on File Prior to Visit   Medication Sig    lamoTRIgine (LaMICtal) 150 MG tablet Take 150 mg by mouth daily    SYNTHROID 100 MCG tablet Take 1 tablet (100 mcg total) by mouth daily    [DISCONTINUED] polyethylene glycol (GLYCOLAX) powder Take one capful with juice or water daily (Patient not taking: Reported on 8/19/2019)     No current facility-administered medications on file prior to visit  She is allergic to demerol [meperidine] and latex       Review of Systems   All other systems reviewed and are negative  Objective:      /70 (BP Location: Right arm, Patient Position: Sitting, Cuff Size: Large)   Pulse 81   Temp 97 9 °F (36 6 °C) (Temporal)   Resp 14   Ht 5' 9" (1 753 m)   Wt 97 5 kg (214 lb 14 4 oz)   SpO2 96%   BMI 31 74 kg/m²          Physical Exam   Constitutional: She is oriented to person, place, and time  She appears well-developed and well-nourished  HENT:   Head: Normocephalic and atraumatic     Right Ear: External ear normal    Left Ear: External ear normal    Nose: Nose normal    Mouth/Throat: Oropharynx is clear and moist    Eyes: Pupils are equal, round, and reactive to light  Conjunctivae and EOM are normal    Neck: Normal range of motion  Neck supple  Cardiovascular: Normal rate, regular rhythm, normal heart sounds and intact distal pulses  Pulmonary/Chest: Effort normal and breath sounds normal    Abdominal: Soft  Bowel sounds are normal    Musculoskeletal: Normal range of motion  Neurological: She is alert and oriented to person, place, and time  Skin: Skin is warm and dry  Capillary refill takes less than 2 seconds  Psychiatric: She has a normal mood and affect  Her behavior is normal  Judgment and thought content normal    Vitals reviewed  BMI Counseling: Body mass index is 31 74 kg/m²  Discussed the patient's BMI with her  The BMI is above average  BMI counseling and education was provided to the patient  Nutrition recommendations include reducing portion sizes, decreasing overall calorie intake, 3-5 servings of fruits/vegetables daily, reducing fast food intake, consuming healthier snacks, decreasing soda and/or juice intake, moderation in carbohydrate intake, increasing intake of lean protein, reducing intake of saturated fat and trans fat and reducing intake of cholesterol

## 2019-09-11 ENCOUNTER — APPOINTMENT (OUTPATIENT)
Dept: LAB | Facility: HOSPITAL | Age: 54
End: 2019-09-11
Payer: COMMERCIAL

## 2019-09-11 DIAGNOSIS — Z11.59 NEED FOR HEPATITIS C SCREENING TEST: ICD-10-CM

## 2019-09-11 DIAGNOSIS — F31.9 BIPOLAR 1 DISORDER (HCC): ICD-10-CM

## 2019-09-11 DIAGNOSIS — E89.0 POSTOPERATIVE HYPOTHYROIDISM: ICD-10-CM

## 2019-09-11 DIAGNOSIS — E89.0 POSTOPERATIVE HYPOTHYROIDISM: Primary | ICD-10-CM

## 2019-09-11 DIAGNOSIS — E78.2 MIXED HYPERLIPIDEMIA: ICD-10-CM

## 2019-09-11 LAB
ALBUMIN SERPL BCP-MCNC: 3.9 G/DL (ref 3.5–5)
ALP SERPL-CCNC: 75 U/L (ref 46–116)
ALT SERPL W P-5'-P-CCNC: 23 U/L (ref 12–78)
ANION GAP SERPL CALCULATED.3IONS-SCNC: 10 MMOL/L (ref 4–13)
AST SERPL W P-5'-P-CCNC: 18 U/L (ref 5–45)
BASOPHILS # BLD AUTO: 0.07 THOUSANDS/ΜL (ref 0–0.1)
BASOPHILS NFR BLD AUTO: 1 % (ref 0–1)
BILIRUB SERPL-MCNC: 0.2 MG/DL (ref 0.2–1)
BUN SERPL-MCNC: 17 MG/DL (ref 5–25)
CALCIUM SERPL-MCNC: 9.1 MG/DL (ref 8.3–10.1)
CHLORIDE SERPL-SCNC: 107 MMOL/L (ref 100–108)
CHOLEST SERPL-MCNC: 180 MG/DL (ref 50–200)
CO2 SERPL-SCNC: 24 MMOL/L (ref 21–32)
CREAT SERPL-MCNC: 0.81 MG/DL (ref 0.6–1.3)
EOSINOPHIL # BLD AUTO: 0.47 THOUSAND/ΜL (ref 0–0.61)
EOSINOPHIL NFR BLD AUTO: 5 % (ref 0–6)
ERYTHROCYTE [DISTWIDTH] IN BLOOD BY AUTOMATED COUNT: 12.8 % (ref 11.6–15.1)
GFR SERPL CREATININE-BSD FRML MDRD: 82 ML/MIN/1.73SQ M
GLUCOSE P FAST SERPL-MCNC: 97 MG/DL (ref 65–99)
HCT VFR BLD AUTO: 43.1 % (ref 34.8–46.1)
HDLC SERPL-MCNC: 50 MG/DL (ref 40–60)
HGB BLD-MCNC: 14.1 G/DL (ref 11.5–15.4)
IMM GRANULOCYTES # BLD AUTO: 0.02 THOUSAND/UL (ref 0–0.2)
IMM GRANULOCYTES NFR BLD AUTO: 0 % (ref 0–2)
LDLC SERPL CALC-MCNC: 87 MG/DL (ref 0–100)
LYMPHOCYTES # BLD AUTO: 3.13 THOUSANDS/ΜL (ref 0.6–4.47)
LYMPHOCYTES NFR BLD AUTO: 34 % (ref 14–44)
MCH RBC QN AUTO: 29.9 PG (ref 26.8–34.3)
MCHC RBC AUTO-ENTMCNC: 32.7 G/DL (ref 31.4–37.4)
MCV RBC AUTO: 91 FL (ref 82–98)
MONOCYTES # BLD AUTO: 0.67 THOUSAND/ΜL (ref 0.17–1.22)
MONOCYTES NFR BLD AUTO: 7 % (ref 4–12)
NEUTROPHILS # BLD AUTO: 4.78 THOUSANDS/ΜL (ref 1.85–7.62)
NEUTS SEG NFR BLD AUTO: 53 % (ref 43–75)
NONHDLC SERPL-MCNC: 130 MG/DL
NRBC BLD AUTO-RTO: 0 /100 WBCS
PLATELET # BLD AUTO: 328 THOUSANDS/UL (ref 149–390)
PMV BLD AUTO: 9.1 FL (ref 8.9–12.7)
POTASSIUM SERPL-SCNC: 4 MMOL/L (ref 3.5–5.3)
PROT SERPL-MCNC: 7.1 G/DL (ref 6.4–8.2)
RBC # BLD AUTO: 4.72 MILLION/UL (ref 3.81–5.12)
SODIUM SERPL-SCNC: 141 MMOL/L (ref 136–145)
T4 FREE SERPL-MCNC: 0.92 NG/DL (ref 0.76–1.46)
TRIGL SERPL-MCNC: 216 MG/DL
TSH SERPL DL<=0.05 MIU/L-ACNC: 3.81 UIU/ML (ref 0.36–3.74)
WBC # BLD AUTO: 9.14 THOUSAND/UL (ref 4.31–10.16)

## 2019-09-11 PROCEDURE — 84439 ASSAY OF FREE THYROXINE: CPT

## 2019-09-11 PROCEDURE — 80053 COMPREHEN METABOLIC PANEL: CPT

## 2019-09-11 PROCEDURE — 36415 COLL VENOUS BLD VENIPUNCTURE: CPT

## 2019-09-11 PROCEDURE — 80061 LIPID PANEL: CPT

## 2019-09-11 PROCEDURE — 85025 COMPLETE CBC W/AUTO DIFF WBC: CPT

## 2019-09-11 PROCEDURE — 86803 HEPATITIS C AB TEST: CPT

## 2019-09-11 PROCEDURE — 84443 ASSAY THYROID STIM HORMONE: CPT

## 2019-09-12 LAB — HCV AB SER QL: NORMAL

## 2019-09-24 ENCOUNTER — TELEPHONE (OUTPATIENT)
Dept: INTERNAL MEDICINE CLINIC | Facility: CLINIC | Age: 54
End: 2019-09-24

## 2019-09-24 DIAGNOSIS — Z12.11 SCREENING FOR COLON CANCER: ICD-10-CM

## 2019-09-24 NOTE — TELEPHONE ENCOUNTER
----- Message from [employer] sent at 9/23/2019  1:29 PM EDT -----  Can you let Richard Coppola know her cologuard is negative

## 2019-09-27 ENCOUNTER — TRANSCRIBE ORDERS (OUTPATIENT)
Dept: ADMINISTRATIVE | Facility: HOSPITAL | Age: 54
End: 2019-09-27

## 2019-09-27 DIAGNOSIS — Z09 FOLLOW UP: ICD-10-CM

## 2019-09-30 ENCOUNTER — AMB VIDEO VISIT (OUTPATIENT)
Dept: OTHER | Facility: HOSPITAL | Age: 54
End: 2019-09-30

## 2019-09-30 ENCOUNTER — AMB VIDEO VISIT (OUTPATIENT)
Dept: URGENT CARE | Facility: MEDICAL CENTER | Age: 54
End: 2019-09-30

## 2019-09-30 DIAGNOSIS — J40 BRONCHITIS: Primary | ICD-10-CM

## 2019-09-30 RX ORDER — BENZONATATE 100 MG/1
100 CAPSULE ORAL 3 TIMES DAILY PRN
Qty: 20 CAPSULE | Refills: 0 | Status: SHIPPED | OUTPATIENT
Start: 2019-09-30 | End: 2022-04-26 | Stop reason: ALTCHOICE

## 2019-09-30 RX ORDER — AZITHROMYCIN 250 MG/1
TABLET, FILM COATED ORAL
Qty: 6 TABLET | Refills: 0 | Status: SHIPPED | OUTPATIENT
Start: 2019-09-30 | End: 2019-10-04

## 2019-09-30 NOTE — PROGRESS NOTES
[facility] [employer] [facility]        NAME: Emily Rasmussen is a 53 y o  female  : 1965    MRN: [id number]  DATE: 2019  TIME: 3:23 PM    Assessment and Plan   Bronchitis [J40]  1  Bronchitis  azithromycin (ZITHROMAX) 250 mg tablet    benzonatate (TESSALON PERLES) 100 mg capsule         Patient Instructions       Follow up with PCP in 3-5 days  Proceed to  ER if symptoms worsen  Chief Complaint   No chief complaint on file  History of Present Illness       E visit  48-year-old female complaining of nonproductive cough for the last 3 weeks  Describes feeling chest congestion, some wheezing  Denies history of asthma  She has been taking over-the-counter Mucinex which seems to be helping briefly  Since she started taking some over-the-counter allergy medication the beginning with no significant improvement  Last night she had low-grade temperature between 98 7-100  Also complained generalized body aches pain   Describes the cough is worse when she lies supine  Review of Systems   Review of Systems   HENT: Negative  Respiratory: Positive for cough and wheezing  Current Medications       Current Outpatient Medications:     azithromycin (ZITHROMAX) 250 mg tablet, Take 2 tablets today then 1 tablet daily x 4 days, Disp: 6 tablet, Rfl: 0    benzonatate (TESSALON PERLES) 100 mg capsule, Take 1 capsule (100 mg total) by mouth 3 (three) times a day as needed for cough, Disp: 20 capsule, Rfl: 0    Current Allergies     Allergies as of 2019    (Not on File)            The following portions of the patient's history were reviewed and updated as appropriate: allergies, current medications, past family history, past medical history, past social history, past surgical history and problem list      No past medical history on file  No past surgical history on file  No family history on file  Medications have been verified          Objective   There were no vitals taken for this visit         Physical Exam     Physical Exam

## 2019-09-30 NOTE — CARE ANYWHERE EVISITS
Visit Summary for [facility]   OSMAN - Gender: Female - Date of Birth: [id number]  Date: [id number] - Duration: 7 minutes  Patient: [facility]  Provider: Sathya Silva    Patient Contact Information  Address  [facility]; [address] [place] [address]  [phone]    Visit Topics  Cold [Added By: Self - 2019-09-30]    Conversation Transcripts  [0A][0A] [Notification] You are connected with Sathya Silva, Family Physician [0A][Notification] Cassandra Soler is located in [address] [place] [address]  [0A][Notification] Cassandra Soler has shared health history  Telly Donald  [0A]    Diagnosis    Procedures  Value: [id number] Code: CPT-4 UNLISTED E&M SERVICE    Medications Prescribed    No prescriptions ordered    Electronically signed by: Tacho Momin(NPI [id number])

## 2019-10-17 ENCOUNTER — HOSPITAL ENCOUNTER (OUTPATIENT)
Dept: MAMMOGRAPHY | Facility: HOSPITAL | Age: 54
Discharge: HOME | End: 2019-10-17
Payer: COMMERCIAL

## 2019-10-17 ENCOUNTER — HOSPITAL ENCOUNTER (OUTPATIENT)
Dept: ULTRASOUND IMAGING | Facility: HOSPITAL | Age: 54
Discharge: HOME | End: 2019-10-17
Payer: COMMERCIAL

## 2019-10-17 VITALS — WEIGHT: 214 LBS | BODY MASS INDEX: 31.7 KG/M2 | HEIGHT: 69 IN

## 2019-10-17 DIAGNOSIS — Z09 FOLLOW UP: ICD-10-CM

## 2019-10-17 PROCEDURE — 77065 DX MAMMO INCL CAD UNI: CPT

## 2019-10-17 PROCEDURE — G0279 TOMOSYNTHESIS, MAMMO: HCPCS

## 2019-10-23 ENCOUNTER — APPOINTMENT (OUTPATIENT)
Dept: LAB | Facility: HOSPITAL | Age: 54
End: 2019-10-23
Payer: COMMERCIAL

## 2019-10-23 DIAGNOSIS — E89.0 POSTOPERATIVE HYPOTHYROIDISM: ICD-10-CM

## 2019-10-23 LAB — TSH SERPL DL<=0.05 MIU/L-ACNC: 2.04 UIU/ML (ref 0.36–3.74)

## 2019-10-23 PROCEDURE — 36415 COLL VENOUS BLD VENIPUNCTURE: CPT

## 2019-10-23 PROCEDURE — 84443 ASSAY THYROID STIM HORMONE: CPT

## 2019-10-31 ENCOUNTER — HOSPITAL ENCOUNTER (EMERGENCY)
Facility: HOSPITAL | Age: 54
Discharge: HOME | End: 2019-10-31
Attending: EMERGENCY MEDICINE
Payer: COMMERCIAL

## 2019-10-31 ENCOUNTER — APPOINTMENT (EMERGENCY)
Dept: NON INVASIVE DIAGNOSTICS | Facility: HOSPITAL | Age: 54
End: 2019-10-31
Payer: COMMERCIAL

## 2019-10-31 ENCOUNTER — APPOINTMENT (EMERGENCY)
Dept: RADIOLOGY | Facility: HOSPITAL | Age: 54
End: 2019-10-31
Payer: COMMERCIAL

## 2019-10-31 VITALS
TEMPERATURE: 97.6 F | OXYGEN SATURATION: 97 % | HEART RATE: 58 BPM | SYSTOLIC BLOOD PRESSURE: 135 MMHG | DIASTOLIC BLOOD PRESSURE: 67 MMHG | RESPIRATION RATE: 21 BRPM

## 2019-10-31 DIAGNOSIS — M54.9 BACK PAIN: Primary | ICD-10-CM

## 2019-10-31 LAB
ALBUMIN SERPL BCP-MCNC: 3.7 G/DL (ref 3.5–5)
ALP SERPL-CCNC: 83 U/L (ref 46–116)
ALT SERPL W P-5'-P-CCNC: 32 U/L (ref 12–78)
ANION GAP SERPL CALCULATED.3IONS-SCNC: 7 MMOL/L (ref 4–13)
APTT PPP: 34 SECONDS (ref 23–37)
AST SERPL W P-5'-P-CCNC: 21 U/L (ref 5–45)
BASOPHILS # BLD AUTO: 0.09 THOUSANDS/ΜL (ref 0–0.1)
BASOPHILS NFR BLD AUTO: 1 % (ref 0–1)
BILIRUB SERPL-MCNC: 0.3 MG/DL (ref 0.2–1)
BUN SERPL-MCNC: 11 MG/DL (ref 5–25)
CALCIUM SERPL-MCNC: 8.8 MG/DL (ref 8.3–10.1)
CHLORIDE SERPL-SCNC: 107 MMOL/L (ref 100–108)
CO2 SERPL-SCNC: 26 MMOL/L (ref 21–32)
CREAT SERPL-MCNC: 0.79 MG/DL (ref 0.6–1.3)
D DIMER PPP FEU-MCNC: <0.27 UG/ML FEU
EOSINOPHIL # BLD AUTO: 0.39 THOUSAND/ΜL (ref 0–0.61)
EOSINOPHIL NFR BLD AUTO: 5 % (ref 0–6)
ERYTHROCYTE [DISTWIDTH] IN BLOOD BY AUTOMATED COUNT: 13.1 % (ref 11.6–15.1)
GFR SERPL CREATININE-BSD FRML MDRD: 85 ML/MIN/1.73SQ M
GLUCOSE SERPL-MCNC: 105 MG/DL (ref 65–140)
HCT VFR BLD AUTO: 42.3 % (ref 34.8–46.1)
HGB BLD-MCNC: 14.1 G/DL (ref 11.5–15.4)
IMM GRANULOCYTES # BLD AUTO: 0.01 THOUSAND/UL (ref 0–0.2)
IMM GRANULOCYTES NFR BLD AUTO: 0 % (ref 0–2)
INR PPP: 1 (ref 0.84–1.19)
LYMPHOCYTES # BLD AUTO: 2.6 THOUSANDS/ΜL (ref 0.6–4.47)
LYMPHOCYTES NFR BLD AUTO: 36 % (ref 14–44)
MCH RBC QN AUTO: 30.3 PG (ref 26.8–34.3)
MCHC RBC AUTO-ENTMCNC: 33.3 G/DL (ref 31.4–37.4)
MCV RBC AUTO: 91 FL (ref 82–98)
MONOCYTES # BLD AUTO: 0.54 THOUSAND/ΜL (ref 0.17–1.22)
MONOCYTES NFR BLD AUTO: 7 % (ref 4–12)
NEUTROPHILS # BLD AUTO: 3.68 THOUSANDS/ΜL (ref 1.85–7.62)
NEUTS SEG NFR BLD AUTO: 51 % (ref 43–75)
NRBC BLD AUTO-RTO: 0 /100 WBCS
NT-PROBNP SERPL-MCNC: 61 PG/ML
PLATELET # BLD AUTO: 318 THOUSANDS/UL (ref 149–390)
PMV BLD AUTO: 9.2 FL (ref 8.9–12.7)
POTASSIUM SERPL-SCNC: 3.6 MMOL/L (ref 3.5–5.3)
PROT SERPL-MCNC: 6.9 G/DL (ref 6.4–8.2)
PROTHROMBIN TIME: 13.2 SECONDS (ref 11.6–14.5)
RBC # BLD AUTO: 4.65 MILLION/UL (ref 3.81–5.12)
SODIUM SERPL-SCNC: 140 MMOL/L (ref 136–145)
TROPONIN I SERPL-MCNC: <0.02 NG/ML
WBC # BLD AUTO: 7.31 THOUSAND/UL (ref 4.31–10.16)

## 2019-10-31 PROCEDURE — 36415 COLL VENOUS BLD VENIPUNCTURE: CPT | Performed by: PHYSICIAN ASSISTANT

## 2019-10-31 PROCEDURE — 99285 EMERGENCY DEPT VISIT HI MDM: CPT

## 2019-10-31 PROCEDURE — 93971 EXTREMITY STUDY: CPT | Performed by: SURGERY

## 2019-10-31 PROCEDURE — 85730 THROMBOPLASTIN TIME PARTIAL: CPT | Performed by: PHYSICIAN ASSISTANT

## 2019-10-31 PROCEDURE — 93971 EXTREMITY STUDY: CPT

## 2019-10-31 PROCEDURE — 80053 COMPREHEN METABOLIC PANEL: CPT | Performed by: PHYSICIAN ASSISTANT

## 2019-10-31 PROCEDURE — 83880 ASSAY OF NATRIURETIC PEPTIDE: CPT | Performed by: PHYSICIAN ASSISTANT

## 2019-10-31 PROCEDURE — 85379 FIBRIN DEGRADATION QUANT: CPT | Performed by: PHYSICIAN ASSISTANT

## 2019-10-31 PROCEDURE — 85025 COMPLETE CBC W/AUTO DIFF WBC: CPT | Performed by: PHYSICIAN ASSISTANT

## 2019-10-31 PROCEDURE — 85610 PROTHROMBIN TIME: CPT | Performed by: PHYSICIAN ASSISTANT

## 2019-10-31 PROCEDURE — 84484 ASSAY OF TROPONIN QUANT: CPT | Performed by: PHYSICIAN ASSISTANT

## 2019-10-31 PROCEDURE — 93005 ELECTROCARDIOGRAM TRACING: CPT

## 2019-10-31 PROCEDURE — 99284 EMERGENCY DEPT VISIT MOD MDM: CPT | Performed by: PHYSICIAN ASSISTANT

## 2019-10-31 PROCEDURE — 71046 X-RAY EXAM CHEST 2 VIEWS: CPT

## 2019-10-31 NOTE — ED PROVIDER NOTES
History  Chief Complaint   Patient presents with    Chest Pain     Had severe chest pain last night on left side  Pain had subsided but then resumed today on posterior ribs region  Pain with deep inspiration     Patient presents to the emergency department today for evaluation of chest and back pain  She presents via private vehicle stating yesterday she began with some left-sided chest pain in the evening that lasted about 2 hours  She states it was worse when she was taking a deep breath however it has subsided  She has not had a chest pain since that event  She states today she noted some right-sided lower back pain in the region of the right lower lobe according to where she is pointing  She states this is also worse with deep inspiration  She has noted some increased shortness of breath  Upon questioning she also admits to some left calf pain swelling  She denies fever chills sweats  Denies current cough  Prior to Admission Medications   Prescriptions Last Dose Informant Patient Reported? Taking?    SYNTHROID 100 MCG tablet  Self No No   Sig: Take 1 tablet (100 mcg total) by mouth daily   benzonatate (TESSALON PERLES) 100 mg capsule   No No   Sig: Take 1 capsule (100 mg total) by mouth 3 (three) times a day as needed for cough   lamoTRIgine (LaMICtal) 150 MG tablet  Self Yes No   Sig: Take 150 mg by mouth daily      Facility-Administered Medications: None       Past Medical History:   Diagnosis Date    B[facility] [address] )     Disease of thyroid gland     [facility]        Past Surgical History:   Procedure Laterality Date    BLADDER NECK RECONSTRUCTION      BLADDER SUSPENSION      HYSTERECTOMY      ROTATOR CUFF REPAIR Bilateral     done twice    SHOULDER SURGERY      THYROIDECTOMY, PARTIAL         Family History   Problem Relation Age of Onset    No Known Problems Mother     No Known Problems Father     No Known Problems Sister     Throat cancer Maternal Grandfather  No Known Problems Sister     No Known Problems Sister     No Known Problems Paternal Aunt     Breast cancer Maternal Aunt 61    Brain cancer Maternal Aunt      I have reviewed and agree with the history as documented  Social History     Tobacco Use    Smoking status: Former Smoker     Packs/day: 1 00     Types: Cigarettes    Smokeless tobacco: Never Used   Substance Use Topics    Alcohol use: No    Drug use: No        Review of Systems   Constitutional: Negative  Negative for chills and fever  HENT: Negative  Negative for sore throat and trouble swallowing  Eyes: Negative  Respiratory: Positive for shortness of breath  Negative for cough and wheezing  Cardiovascular: Positive for chest pain and leg swelling  Gastrointestinal: Negative  Negative for abdominal pain, blood in stool and vomiting  Endocrine: Negative  Genitourinary: Negative  Musculoskeletal: Positive for back pain  Negative for neck stiffness  Skin: Negative  Allergic/Immunologic: Negative  Neurological: Negative  Negative for dizziness, seizures, speech difficulty, weakness, light-headedness, numbness and headaches  Hematological: Negative  Psychiatric/Behavioral: Negative  All other systems reviewed and are negative  Physical Exam  Physical Exam   Constitutional: She is oriented to person, place, and time  Vital signs are normal  She appears well-developed and well-nourished  She does not have a sickly appearance  She does not appear ill  No distress  HENT:   Right Ear: External ear normal  No swelling  Tympanic membrane is not bulging  Left Ear: External ear normal  No swelling  Tympanic membrane is not bulging  Nose: Nose normal    Mouth/Throat: Oropharynx is clear and moist  No oropharyngeal exudate  Eyes: Pupils are equal, round, and reactive to light  Conjunctivae, EOM and lids are normal    Neck: Normal range of motion  Neck supple  No JVD present   No tracheal deviation, no edema and normal range of motion present  No thyromegaly present  Cardiovascular: Normal rate, regular rhythm, normal heart sounds, intact distal pulses and normal pulses  Exam reveals no gallop and no friction rub  No murmur heard  Pulmonary/Chest: Effort normal and breath sounds normal  No stridor  No respiratory distress  She has no wheezes  She has no rales  She exhibits no tenderness  Abdominal: Soft  Bowel sounds are normal  She exhibits no distension and no mass  There is no tenderness  There is no rebound, no guarding and negative Joe's sign  No hernia  Musculoskeletal: Normal range of motion  She exhibits no edema  Right lower leg: Normal  She exhibits no tenderness and no edema  Left lower leg: She exhibits tenderness  Lymphadenopathy:     She has no cervical adenopathy  Neurological: She is alert and oriented to person, place, and time  She has normal strength and normal reflexes  No cranial nerve deficit or sensory deficit  GCS eye subscore is 4  GCS verbal subscore is 5  GCS motor subscore is 6  Skin: Skin is warm and dry  Capillary refill takes less than 2 seconds  No rash noted  She is not diaphoretic  No erythema  No pallor  Psychiatric: She has a normal mood and affect  Her speech is normal and behavior is normal    Vitals reviewed        Vital Signs  ED Triage Vitals   Temperature Pulse Respirations Blood Pressure SpO2   10/31/19 1519 10/31/19 1519 10/31/19 1519 10/31/19 1519 10/31/19 1519   97 6 °F (36 4 °C) 65 18 156/76 97 %      Temp Source Heart Rate Source Patient Position - Orthostatic VS BP Location FiO2 (%)   10/31/19 1519 10/31/19 1519 10/31/19 1645 10/31/19 1519 --   Temporal Monitor Lying Left arm       Pain Score       10/31/19 1519       7           Vitals:    10/31/19 1519 10/31/19 1630 10/31/19 1645   BP: 156/76  135/67   Pulse: 65 58 58   Patient Position - Orthostatic VS:   Lying         Visual Acuity      ED Medications  Medications - No data to display    Diagnostic Studies  Results Reviewed     Procedure Component Value Units Date/Time    NT-BNP PRO [[id number]]  (Normal) Collected:  10/31/19 1539    Lab Status:  Final result Specimen:  Blood Updated:  10/31/19 1608     NT-proBNP 61 pg/mL     Comprehensive metabolic panel [[id number]] Collected:  10/31/19 1539    Lab Status:  Final result Specimen:  Blood from Arm, Left Updated:  10/31/19 1605     Sodium 140 mmol/L      Potassium 3 6 mmol/L      Chloride 107 mmol/L      CO2 26 mmol/L      ANION GAP 7 mmol/L      BUN 11 mg/dL      Creatinine 0 79 mg/dL      Glucose 105 mg/dL      Calcium 8 8 mg/dL      AST 21 U/L      ALT 32 U/L      Alkaline Phosphatase 83 U/L      Total Protein 6 9 g/dL      Albumin 3 7 g/dL      Total Bilirubin 0 30 mg/dL      eGFR 85 ml/min/1 73sq m     Narrative:       [place] guidelines for Chronic Kidney Disease (CKD):     Stage 1 with normal or high GFR (GFR > 90 mL/min/1 73 square meters)    Stage 2 Mild CKD (GFR = 60-89 mL/min/1 73 square meters)    Stage 3A Moderate CKD (GFR = 45-59 mL/min/1 73 square meters)    Stage 3B Moderate CKD (GFR = 30-44 mL/min/1 73 square meters)    Stage 4 Severe CKD (GFR = 15-29 mL/min/1 73 square meters)    Stage 5 End Stage CKD (GFR <15 mL/min/1 73 square meters)  Note: GFR calculation is accurate only with a steady state creatinine    Troponin I [[id number]]  (Normal) Collected:  10/31/19 1539    Lab Status:  Final result Specimen:  Blood from Arm, Left Updated:  10/31/19 1603     Troponin I <0 02 ng/mL     D-Dimer [[id number]]  (Normal) Collected:  10/31/19 1539    Lab Status:  Final result Specimen:  Blood from Arm, Left Updated:  10/31/19 1602     D-Dimer, Quant <0 27 ug/ml FEU     Protime-INR [[id number]]  (Normal) Collected:  10/31/19 1539    Lab Status:  Final result Specimen:  Blood from Arm, Left Updated:  10/31/19 1557     Protime 13 2 seconds      INR 1 00    APTT [[id number]]  (Normal) Collected:  10/31/19 1539    Lab Status:  Final result Specimen:  Blood from Arm, Left Updated:  10/31/19 1557     PTT 34 seconds     CBC and differential [[id number]] Collected:  10/31/19 1539    Lab Status:  Final result Specimen:  Blood from Arm, Left Updated:  10/31/19 1546     WBC 7 31 Thousand/uL      RBC 4 65 Million/uL      Hemoglobin 14 1 g/dL      Hematocrit 42 3 %      MCV 91 fL      MCH 30 3 pg      MCHC 33 3 g/dL      RDW 13 1 %      MPV 9 2 fL      Platelets 312 Thousands/uL      nRBC 0 /100 WBCs      Neutrophils Relative 51 %      Immat GRANS % 0 %      Lymphocytes Relative 36 %      Monocytes Relative 7 %      Eosinophils Relative 5 %      Basophils Relative 1 %      Neutrophils Absolute 3 68 Thousands/µL      Immature Grans Absolute 0 01 Thousand/uL      Lymphocytes Absolute 2 60 Thousands/µL      Monocytes Absolute 0 54 Thousand/µL      Eosinophils Absolute 0 39 Thousand/µL      Basophils Absolute 0 09 Thousands/µL                  XR chest 2 views   ED Interpretation by Kamila Snow PA-C (10/31 1719)   No evidence of acute cardiopulmonary disease      VAS lower limb venous duplex study, unilateral/limited    (Results Pending)              Procedures  ECG 12 Lead Documentation Only  Date/Time: 10/31/2019 3:31 PM  Performed by: Kamila Snow PA-C  Authorized by: Kamila Snow PA-C     Indications / Diagnosis:  Chest/back pain  ECG reviewed by me, the ED Provider: yes    Patient location:  ED  Previous ECG:     Comparison to cardiac monitor: Yes    Interpretation:     Interpretation: normal    Rate:     ECG rate:  65    ECG rate assessment: normal    Rhythm:     Rhythm: sinus rhythm    Ectopy:     Ectopy: none    QRS:     QRS axis:  Normal    QRS intervals:  Normal  Conduction:     Conduction: normal    ST segments:     ST segments:  Normal  T waves:     T waves: normal             ED Course  ED Course as of Oct 31 1721   Thu Oct 31, 2019   1524 Blood Pressure: 156/76   1524 Temperature: 97 6 °F (36 4 °C) 1524 Pulse: 65   1524 Respirations: 18   1524 SpO2: 97 %   1638 NT-proBNP: 61   1638 D-Dimer, Quant: <0 27   1638 Troponin I: <0 02   1638 PTT: 34   1638 INR: 1 00   1638 Sodium: 140   1638 Chloride: 107   1638 Glucose, Random: 105   1638 eGFR: 85   1638 eGFR: 85   1638 WBC: 7 31   1638 Hemoglobin: 14 1   1638 Platelet Count: 980   4661 Likely two view chest than discharge      1708 Pt at CXR            HEART Risk Score      Most Recent Value   History  0 Filed at: 10/31/2019 1531   ECG  0 Filed at: 10/31/2019 1531   Age  1 Filed at: 10/31/2019 1531   Risk Factors  1 Filed at: 10/31/2019 1531   Troponin  0 Filed at: 10/31/2019 1531   Heart Score Risk Calculator   History  0 Filed at: 10/31/2019 1531   ECG  0 Filed at: 10/31/2019 1531   Age  1 Filed at: 10/31/2019 1531   Risk Factors  1 Filed at: 10/31/2019 1531   Troponin  0 Filed at: 10/31/2019 1531   HEART Score  2 Filed at: 10/31/2019 1531   HEART Score  2 Filed at: 10/31/2019 1531                            MDM    Disposition  Final diagnoses:   Back pain     Time reflects when diagnosis was documented in both MDM as applicable and the Disposition within this note     Time User Action Codes Description Comment    10/31/2019  5:20 PM Jatinder Ilya Add [M54 9] Back pain       ED Disposition     ED Disposition Condition Date/Time Comment    Discharge Stable Thu Oct 31, 2019  5:20 PM Jose Torrez discharge to home/self care  Follow-up Information     Follow up With Specialties Details Why Contact Info    Stanislav Onofre MD [facility] Medicine Schedule an appointment as soon as possible for a visit   [facility] [address]  [phone]            Patient's Medications   Discharge Prescriptions    No medications on file     No discharge procedures on file      ED Provider  Electronically Signed by           Asia Quiroz PA-C  10/31/19 6936

## 2019-11-04 LAB
ATRIAL RATE: 65 BPM
P AXIS: 60 DEGREES
PR INTERVAL: 138 MS
QRS AXIS: 71 DEGREES
QRSD INTERVAL: 90 MS
QT INTERVAL: 394 MS
QTC INTERVAL: 409 MS
T WAVE AXIS: 30 DEGREES
VENTRICULAR RATE: 65 BPM

## 2019-11-04 PROCEDURE — 93010 ELECTROCARDIOGRAM REPORT: CPT | Performed by: INTERNAL MEDICINE

## 2020-02-08 DIAGNOSIS — E89.0 POSTOPERATIVE HYPOTHYROIDISM: ICD-10-CM

## 2020-02-09 RX ORDER — LEVOTHYROXINE SODIUM 100 MCG
100 TABLET ORAL DAILY
Qty: 90 TABLET | Refills: 2 | Status: SHIPPED | OUTPATIENT
Start: 2020-02-09 | End: 2020-11-03 | Stop reason: SDUPTHER

## 2020-03-25 ENCOUNTER — TELEMEDICINE (OUTPATIENT)
Dept: INTERNAL MEDICINE CLINIC | Facility: CLINIC | Age: 55
End: 2020-03-25
Payer: COMMERCIAL

## 2020-03-25 DIAGNOSIS — Z20.828 EXPOSURE TO SARS-ASSOCIATED CORONAVIRUS: Primary | ICD-10-CM

## 2020-03-25 PROBLEM — M19.90 OSTEOARTHRITIS: Status: ACTIVE | Noted: 2020-03-25

## 2020-03-25 PROBLEM — F17.200 NICOTINE DEPENDENCE, UNSPECIFIED, UNCOMPLICATED: Status: ACTIVE | Noted: 2020-03-25

## 2020-03-25 PROBLEM — R13.10 DYSPHAGIA: Status: ACTIVE | Noted: 2020-03-25

## 2020-03-25 PROBLEM — D35.2 BENIGN NEOPLASM OF PITUITARY GLAND AND CRANIOPHARYNGEAL DUCT (POUCH) (HCC): Status: ACTIVE | Noted: 2020-03-25

## 2020-03-25 PROBLEM — D47.3 ESSENTIAL THROMBOCYTOSIS (HCC): Status: ACTIVE | Noted: 2020-03-25

## 2020-03-25 PROBLEM — R73.09 OTHER ABNORMAL GLUCOSE: Status: ACTIVE | Noted: 2020-03-25

## 2020-03-25 PROBLEM — R91.1 SOLITARY PULMONARY NODULE: Status: ACTIVE | Noted: 2020-03-25

## 2020-03-25 PROBLEM — R49.0 DYSPHONIA: Status: ACTIVE | Noted: 2020-03-25

## 2020-03-25 PROBLEM — D49.2 NEOPLASM OF BONE, SOFT TISSUE, AND SKIN: Status: ACTIVE | Noted: 2020-03-25

## 2020-03-25 PROBLEM — D35.3 BENIGN NEOPLASM OF PITUITARY GLAND AND CRANIOPHARYNGEAL DUCT (POUCH) (HCC): Status: ACTIVE | Noted: 2020-03-25

## 2020-03-25 PROBLEM — E78.00 PURE HYPERCHOLESTEROLEMIA, UNSPECIFIED: Status: ACTIVE | Noted: 2020-03-25

## 2020-03-25 PROCEDURE — 99442 PR PHYS/QHP TELEPHONE EVALUATION 11-20 MIN: CPT | Performed by: FAMILY MEDICINE

## 2020-03-25 NOTE — PROGRESS NOTES
COVID-19 Virtual Visit     This virtual check-in was done via telephone  Encounter provider Zuleika Restrepo MD    Provider located at [address]  [address] [facility] [address]    Recent Visits  No visits were found meeting these conditions  Showing recent visits within past 7 days and meeting all other requirements     Future Appointments  No visits were found meeting these conditions  Showing future appointments within next 150 days and meeting all other requirements        Patient agrees to participate in a virtual check in via telephone or video visit instead of presenting to the office to address urgent/immediate medical needs  Patient is aware this is a billable service  After connecting through telephone, the patient was identified by name and date of birth  Delma Sheridan was informed that this was a telemedicine visit and that the exam was being conducted confidentially over secure lines  My office door was closed  No one else was in the room  Delma Sheridan acknowledged consent and understanding of privacy and security of the telemedicine visit  I informed the patient that I have reviewed her record in Epic and presented the opportunity for her to ask any questions regarding the visit today  The patient agreed to participate  Delma Sheridan is a 63 y o  female who is concerned about COVID-19  She reports fever, cough, shortness of breath and sore throat and diarrhea  Has also had body aches  Works in the hospital   Fever over 101 for last 2 days    She has not traveled outside the [place]  within the last 14 days    She has not had contact with a person who is under investigation for or who is positive for COVID-19 within the last 14 days  She has not been hospitalized recently for fever and/or lower respiratory symptoms      Past Medical History:   Diagnosis Date    [facility]     Disease of thyroid gland     Thyroid cancer ([facility]        Past Surgical History:   Procedure Laterality Date    BLADDER NECK RECONSTRUCTION      BLADDER SUSPENSION      HYSTERECTOMY      ROTATOR CUFF REPAIR Bilateral     done twice    SHOULDER SURGERY      THYROIDECTOMY, PARTIAL         Current Outpatient Medications   Medication Sig Dispense Refill    benzonatate (TESSALON PERLES) 100 mg capsule Take 1 capsule (100 mg total) by mouth 3 (three) times a day as needed for cough 20 capsule 0    lamoTRIgine (LaMICtal) 150 MG tablet Take 150 mg by mouth daily      SYNTHROID 100 MCG tablet Take 1 tablet (100 mcg total) by mouth daily 90 tablet 2     No current facility-administered medications for this visit  Allergies   Allergen Reactions    Demerol [Meperidine]     Latex Rash       Video Exam    Madhu Long appears mild distress  Disposition:      I referred Madhu Long to one of our centralized sites for a COVID-19 swab  I spent 15 minutes with the patient during this virtual check-in visit

## 2020-03-26 DIAGNOSIS — Z20.828 EXPOSURE TO SARS-ASSOCIATED CORONAVIRUS: ICD-10-CM

## 2020-03-26 PROCEDURE — 87635 SARS-COV-2 COVID-19 AMP PRB: CPT

## 2020-03-27 ENCOUNTER — TELEPHONE (OUTPATIENT)
Dept: INTERNAL MEDICINE CLINIC | Facility: CLINIC | Age: 55
End: 2020-03-27

## 2020-03-30 ENCOUNTER — TELEPHONE (OUTPATIENT)
Dept: INTERNAL MEDICINE CLINIC | Facility: CLINIC | Age: 55
End: 2020-03-30

## 2020-03-30 NOTE — TELEPHONE ENCOUNTER
Patient called today upset over the COVID and her swab not being back yet  She is trying to call [employer] due to her worsening panic attacks but can not get through  She states she is not sure what to do about work and her  due to him having lung problems  I did let her know she can not return to work until her swab is back which it is not and to stay at least 6 feet away from her  to to continue good hand hygiene  She did calm down on call and was feeling better  I did let her know we will notify her once swab is back

## 2020-03-31 LAB — SARS-COV-2 RNA SPEC QL NAA+PROBE: NOT DETECTED

## 2020-05-13 ENCOUNTER — APPOINTMENT (EMERGENCY)
Dept: CT IMAGING | Facility: HOSPITAL | Age: 55
End: 2020-05-13
Payer: COMMERCIAL

## 2020-05-13 ENCOUNTER — HOSPITAL ENCOUNTER (EMERGENCY)
Facility: HOSPITAL | Age: 55
Discharge: HOME | End: 2020-05-13
Attending: EMERGENCY MEDICINE | Admitting: EMERGENCY MEDICINE
Payer: COMMERCIAL

## 2020-05-13 VITALS
DIASTOLIC BLOOD PRESSURE: 70 MMHG | BODY MASS INDEX: 33.6 KG/M2 | SYSTOLIC BLOOD PRESSURE: 161 MMHG | HEIGHT: 69 IN | RESPIRATION RATE: 18 BRPM | HEART RATE: 70 BPM | OXYGEN SATURATION: 100 % | TEMPERATURE: 96.9 F | WEIGHT: 226.85 LBS

## 2020-05-13 DIAGNOSIS — M51.37 DDD (DEGENERATIVE DISC DISEASE), LUMBOSACRAL: ICD-10-CM

## 2020-05-13 DIAGNOSIS — S32.2XXA: ICD-10-CM

## 2020-05-13 DIAGNOSIS — R03.0 ELEVATED BLOOD PRESSURE READING: ICD-10-CM
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
## 2020-05-13 DIAGNOSIS — M53.3 COCCYDYNIA: ICD-10-CM

## 2020-05-13 DIAGNOSIS — S32.10XA: ICD-10-CM

## 2020-05-13 DIAGNOSIS — R81 GLUCOSURIA: ICD-10-CM

## 2020-05-13 DIAGNOSIS — Q76.49 LUMBARIZATION, VERTEBRA: ICD-10-CM

## 2020-05-13 LAB
BILIRUB UR QL STRIP: NEGATIVE
CLARITY UR: CLEAR
COLOR UR: YELLOW
GLUCOSE SERPL-MCNC: 83 MG/DL (ref 65–140)
GLUCOSE UR STRIP-MCNC: ABNORMAL MG/DL
HGB UR QL STRIP.AUTO: NEGATIVE
KETONES UR STRIP-MCNC: NEGATIVE MG/DL
LEUKOCYTE ESTERASE UR QL STRIP: NEGATIVE
NITRITE UR QL STRIP: NEGATIVE
PH UR STRIP.AUTO: 5.5 [PH]
PROT UR STRIP-MCNC: NEGATIVE MG/DL
SP GR UR STRIP.AUTO: >=1.03 (ref 1–1.03)
UROBILINOGEN UR QL STRIP.AUTO: 0.2 E.U./DL

## 2020-05-13 PROCEDURE — 81003 URINALYSIS AUTO W/O SCOPE: CPT | Performed by: EMERGENCY MEDICINE

## 2020-05-13 PROCEDURE — 72131 CT LUMBAR SPINE W/O DYE: CPT

## 2020-05-13 PROCEDURE — 99284 EMERGENCY DEPT VISIT MOD MDM: CPT | Performed by: EMERGENCY MEDICINE

## 2020-05-13 PROCEDURE — 99284 EMERGENCY DEPT VISIT MOD MDM: CPT

## 2020-05-13 PROCEDURE — 82948 REAGENT STRIP/BLOOD GLUCOSE: CPT

## 2020-05-13 PROCEDURE — 96372 THER/PROPH/DIAG INJ SC/IM: CPT

## 2020-05-13 RX ORDER — TRAMADOL HYDROCHLORIDE 50 MG/1
50 TABLET ORAL EVERY 8 HOURS PRN
Qty: 9 TABLET | Refills: 0 | Status: SHIPPED | OUTPATIENT
Start: 2020-05-13 | End: 2020-05-16

## 2020-05-13 RX ORDER — KETOROLAC TROMETHAMINE 30 MG/ML
30 INJECTION, SOLUTION INTRAMUSCULAR; INTRAVENOUS ONCE
Status: COMPLETED | OUTPATIENT
Start: 2020-05-13 | End: 2020-05-13

## 2020-05-13 RX ORDER — TRAMADOL HYDROCHLORIDE 50 MG/1
50 TABLET ORAL ONCE
Status: COMPLETED | OUTPATIENT
Start: 2020-05-13 | End: 2020-05-13

## 2020-05-13 RX ORDER — ACETAMINOPHEN 325 MG/1
650 TABLET ORAL ONCE
Status: COMPLETED | OUTPATIENT
Start: 2020-05-13 | End: 2020-05-13

## 2020-05-13 RX ORDER — CYCLOBENZAPRINE HCL 10 MG
10 TABLET ORAL ONCE
Status: COMPLETED | OUTPATIENT
Start: 2020-05-13 | End: 2020-05-13

## 2020-05-13 RX ADMIN — CYCLOBENZAPRINE HYDROCHLORIDE 10 MG: 10 TABLET, FILM COATED ORAL at 01:07

## 2020-05-13 RX ADMIN — KETOROLAC TROMETHAMINE 30 MG: 30 INJECTION, SOLUTION INTRAMUSCULAR at 01:08

## 2020-05-13 RX ADMIN — ACETAMINOPHEN 650 MG: 325 TABLET, FILM COATED ORAL at 01:06

## 2020-05-13 RX ADMIN — TRAMADOL HYDROCHLORIDE 50 MG: 50 TABLET, FILM COATED ORAL at 01:53

## 2020-05-15 ENCOUNTER — TELEPHONE (OUTPATIENT)
Dept: OBGYN CLINIC | Facility: CLINIC | Age: 55
End: 2020-05-15

## 2020-05-21 ENCOUNTER — TELEPHONE (OUTPATIENT)
Dept: INTERNAL MEDICINE CLINIC | Facility: CLINIC | Age: 55
End: 2020-05-21

## 2020-05-22 ENCOUNTER — TELEPHONE (OUTPATIENT)
Dept: OBGYN CLINIC | Facility: HOSPITAL | Age: 55
End: 2020-05-22

## 2020-05-27 ENCOUNTER — APPOINTMENT (OUTPATIENT)
Dept: RADIOLOGY | Facility: MEDICAL CENTER | Age: 55
End: 2020-05-27
Payer: COMMERCIAL

## 2020-05-27 ENCOUNTER — OFFICE VISIT (OUTPATIENT)
Dept: OBGYN CLINIC | Facility: CLINIC | Age: 55
End: 2020-05-27
Payer: COMMERCIAL

## 2020-05-27 VITALS — TEMPERATURE: 98 F | HEIGHT: 69 IN | WEIGHT: 228 LBS | BODY MASS INDEX: 33.77 KG/M2

## 2020-05-27 DIAGNOSIS — M84.48XA SACRAL INSUFFICIENCY FRACTURE, INITIAL ENCOUNTER: ICD-10-CM

## 2020-05-27 DIAGNOSIS — M84.48XA SACRAL INSUFFICIENCY FRACTURE, INITIAL ENCOUNTER: Primary | ICD-10-CM

## 2020-05-27 PROCEDURE — 1036F TOBACCO NON-USER: CPT | Performed by: ORTHOPAEDIC SURGERY

## 2020-05-27 PROCEDURE — 72220 X-RAY EXAM SACRUM TAILBONE: CPT

## 2020-05-27 PROCEDURE — 99203 OFFICE O/P NEW LOW 30 MIN: CPT | Performed by: ORTHOPAEDIC SURGERY

## 2020-05-27 PROCEDURE — 3008F BODY MASS INDEX DOCD: CPT | Performed by: ORTHOPAEDIC SURGERY

## 2020-05-27 RX ORDER — COVID-19 ANTIGEN TEST
KIT MISCELLANEOUS
COMMUNITY
End: 2020-06-08 | Stop reason: ALTCHOICE

## 2020-05-27 RX ORDER — B-COMPLEX WITH VITAMIN C
1 TABLET ORAL
Qty: 90 TABLET | Refills: 4 | Status: SHIPPED | OUTPATIENT
Start: 2020-05-27

## 2020-05-27 RX ORDER — B-COMPLEX WITH VITAMIN C
1 TABLET ORAL
Qty: 90 TABLET | Refills: 4 | Status: SHIPPED | OUTPATIENT
Start: 2020-05-27 | End: 2020-05-27

## 2020-05-27 RX ORDER — IBUPROFEN 800 MG/1
800 TABLET ORAL EVERY 8 HOURS SCHEDULED
Qty: 15 TABLET | Refills: 0 | Status: SHIPPED | OUTPATIENT
Start: 2020-05-27 | End: 2020-06-08 | Stop reason: SDUPTHER

## 2020-05-27 RX ORDER — RANITIDINE 150 MG/1
150 CAPSULE ORAL 2 TIMES DAILY
Qty: 30 CAPSULE | Refills: 1 | Status: SHIPPED | OUTPATIENT
Start: 2020-05-27 | End: 2022-04-26 | Stop reason: ALTCHOICE

## 2020-06-08 ENCOUNTER — TELEPHONE (OUTPATIENT)
Dept: OBGYN CLINIC | Facility: CLINIC | Age: 55
End: 2020-06-08

## 2020-06-08 DIAGNOSIS — M84.48XA SACRAL INSUFFICIENCY FRACTURE, INITIAL ENCOUNTER: ICD-10-CM

## 2020-06-08 RX ORDER — IBUPROFEN 800 MG/1
800 TABLET ORAL EVERY 8 HOURS PRN
Qty: 20 TABLET | Refills: 0 | Status: SHIPPED | OUTPATIENT
Start: 2020-06-08 | End: 2022-04-26

## 2020-06-15 ENCOUNTER — TRANSCRIBE ORDERS (OUTPATIENT)
Dept: ADMINISTRATIVE | Facility: HOSPITAL | Age: 55
End: 2020-06-15

## 2020-06-15 DIAGNOSIS — R92.8 ABNORMAL MAMMOGRAM: Primary | ICD-10-CM

## 2020-07-02 ENCOUNTER — HOSPITAL ENCOUNTER (OUTPATIENT)
Dept: MAMMOGRAPHY | Facility: HOSPITAL | Age: 55
Discharge: HOME | End: 2020-07-02
Payer: COMMERCIAL

## 2020-07-02 VITALS — WEIGHT: 228 LBS | BODY MASS INDEX: 33.77 KG/M2 | HEIGHT: 69 IN

## 2020-07-02 DIAGNOSIS — R92.8 ABNORMAL MAMMOGRAM: ICD-10-CM

## 2020-07-02 PROCEDURE — G0279 TOMOSYNTHESIS, MAMMO: HCPCS

## 2020-07-02 PROCEDURE — 77066 DX MAMMO INCL CAD BI: CPT

## 2020-11-03 DIAGNOSIS — E89.0 POSTOPERATIVE HYPOTHYROIDISM: ICD-10-CM

## 2020-11-03 RX ORDER — LEVOTHYROXINE SODIUM 100 MCG
100 TABLET ORAL DAILY
Qty: 90 TABLET | Refills: 3 | Status: SHIPPED | OUTPATIENT
Start: 2020-11-03 | End: 2021-11-02

## 2020-12-31 ENCOUNTER — IMMUNIZATIONS (OUTPATIENT)
Dept: FAMILY MEDICINE CLINIC | Facility: HOSPITAL | Age: 55
End: 2020-12-31

## 2020-12-31 DIAGNOSIS — Z23 ENCOUNTER FOR IMMUNIZATION: ICD-10-CM

## 2020-12-31 PROCEDURE — 0011A SARS-COV-2 / COVID-19 MRNA VACCINE (MODERNA) 100 MCG: CPT

## 2020-12-31 PROCEDURE — 91301 SARS-COV-2 / COVID-19 MRNA VACCINE (MODERNA) 100 MCG: CPT

## 2021-01-27 ENCOUNTER — IMMUNIZATIONS (OUTPATIENT)
Dept: FAMILY MEDICINE CLINIC | Facility: HOSPITAL | Age: 56
End: 2021-01-27

## 2021-01-27 DIAGNOSIS — Z23 ENCOUNTER FOR IMMUNIZATION: Primary | ICD-10-CM

## 2021-01-27 PROCEDURE — 91301 SARS-COV-2 / COVID-19 MRNA VACCINE (MODERNA) 100 MCG: CPT

## 2021-01-27 PROCEDURE — 0012A SARS-COV-2 / COVID-19 MRNA VACCINE (MODERNA) 100 MCG: CPT

## 2021-02-15 ENCOUNTER — TELEPHONE (OUTPATIENT)
Dept: OBGYN CLINIC | Facility: HOSPITAL | Age: 56
End: 2021-02-15

## 2021-02-15 NOTE — TELEPHONE ENCOUNTER
Patient is calling asking if we can fill out the paperwork from her injury May 2020  The paperwork is accident through [facility]  Patient was told that she has a year to submit it    Patient will drop the form off      [phone]

## 2021-02-24 ENCOUNTER — OFFICE VISIT (OUTPATIENT)
Dept: INTERNAL MEDICINE CLINIC | Facility: CLINIC | Age: 56
End: 2021-02-24
Payer: COMMERCIAL

## 2021-02-24 VITALS
HEART RATE: 57 BPM | OXYGEN SATURATION: 95 % | SYSTOLIC BLOOD PRESSURE: 128 MMHG | DIASTOLIC BLOOD PRESSURE: 80 MMHG | TEMPERATURE: 97.2 F | BODY MASS INDEX: 33.12 KG/M2 | WEIGHT: 223.6 LBS | HEIGHT: 69 IN

## 2021-02-24 DIAGNOSIS — E78.2 MIXED HYPERLIPIDEMIA: ICD-10-CM

## 2021-02-24 DIAGNOSIS — R53.82 CHRONIC FATIGUE: Primary | ICD-10-CM

## 2021-02-24 DIAGNOSIS — E03.9 HYPOTHYROIDISM, UNSPECIFIED TYPE: ICD-10-CM

## 2021-02-24 DIAGNOSIS — R73.02 IMPAIRED GLUCOSE TOLERANCE: ICD-10-CM

## 2021-02-24 DIAGNOSIS — E55.9 VITAMIN D DEFICIENCY: ICD-10-CM

## 2021-02-24 DIAGNOSIS — Z01.419 WELL FEMALE EXAM WITH ROUTINE GYNECOLOGICAL EXAM: ICD-10-CM

## 2021-02-24 DIAGNOSIS — D47.3 ESSENTIAL THROMBOCYTOSIS (HCC): ICD-10-CM

## 2021-02-24 PROBLEM — M79.10 MYALGIA: Status: ACTIVE | Noted: 2021-02-24

## 2021-02-24 PROCEDURE — 99214 OFFICE O/P EST MOD 30 MIN: CPT | Performed by: FAMILY MEDICINE

## 2021-02-25 ENCOUNTER — LAB (OUTPATIENT)
Dept: LAB | Facility: HOSPITAL | Age: 56
End: 2021-02-25
Payer: COMMERCIAL

## 2021-02-25 DIAGNOSIS — E78.2 MIXED HYPERLIPIDEMIA: ICD-10-CM

## 2021-02-25 DIAGNOSIS — E55.9 VITAMIN D DEFICIENCY: ICD-10-CM

## 2021-02-25 DIAGNOSIS — D47.3 ESSENTIAL THROMBOCYTOSIS (HCC): ICD-10-CM

## 2021-02-25 DIAGNOSIS — R73.02 IMPAIRED GLUCOSE TOLERANCE: ICD-10-CM

## 2021-02-25 DIAGNOSIS — E03.9 HYPOTHYROIDISM, UNSPECIFIED TYPE: ICD-10-CM

## 2021-02-25 LAB
25(OH)D3 SERPL-MCNC: 13.9 NG/ML (ref 30–100)
ALBUMIN SERPL BCP-MCNC: 4.1 G/DL (ref 3.5–5)
ALP SERPL-CCNC: 74 U/L (ref 46–116)
ALT SERPL W P-5'-P-CCNC: 28 U/L (ref 12–78)
ANION GAP SERPL CALCULATED.3IONS-SCNC: 8 MMOL/L (ref 4–13)
AST SERPL W P-5'-P-CCNC: 16 U/L (ref 5–45)
BASOPHILS # BLD AUTO: 0.09 THOUSANDS/ΜL (ref 0–0.1)
BASOPHILS NFR BLD AUTO: 1 % (ref 0–1)
BILIRUB SERPL-MCNC: 0.4 MG/DL (ref 0.2–1)
BUN SERPL-MCNC: 9 MG/DL (ref 5–25)
CALCIUM SERPL-MCNC: 9.5 MG/DL (ref 8.3–10.1)
CHLORIDE SERPL-SCNC: 106 MMOL/L (ref 100–108)
CHOLEST SERPL-MCNC: 188 MG/DL (ref 50–200)
CO2 SERPL-SCNC: 27 MMOL/L (ref 21–32)
CREAT SERPL-MCNC: 0.86 MG/DL (ref 0.6–1.3)
EOSINOPHIL # BLD AUTO: 0.39 THOUSAND/ΜL (ref 0–0.61)
EOSINOPHIL NFR BLD AUTO: 5 % (ref 0–6)
ERYTHROCYTE [DISTWIDTH] IN BLOOD BY AUTOMATED COUNT: 12.8 % (ref 11.6–15.1)
EST. AVERAGE GLUCOSE BLD GHB EST-MCNC: 111 MG/DL
GFR SERPL CREATININE-BSD FRML MDRD: 75 ML/MIN/1.73SQ M
GLUCOSE SERPL-MCNC: 117 MG/DL (ref 65–140)
HBA1C MFR BLD: 5.5 %
HCT VFR BLD AUTO: 44.3 % (ref 34.8–46.1)
HDLC SERPL-MCNC: 57 MG/DL
HGB BLD-MCNC: 14.5 G/DL (ref 11.5–15.4)
IMM GRANULOCYTES # BLD AUTO: 0.02 THOUSAND/UL (ref 0–0.2)
IMM GRANULOCYTES NFR BLD AUTO: 0 % (ref 0–2)
LDLC SERPL CALC-MCNC: 96 MG/DL (ref 0–100)
LYMPHOCYTES # BLD AUTO: 2.55 THOUSANDS/ΜL (ref 0.6–4.47)
LYMPHOCYTES NFR BLD AUTO: 33 % (ref 14–44)
MCH RBC QN AUTO: 29.4 PG (ref 26.8–34.3)
MCHC RBC AUTO-ENTMCNC: 32.7 G/DL (ref 31.4–37.4)
MCV RBC AUTO: 90 FL (ref 82–98)
MONOCYTES # BLD AUTO: 0.56 THOUSAND/ΜL (ref 0.17–1.22)
MONOCYTES NFR BLD AUTO: 7 % (ref 4–12)
NEUTROPHILS # BLD AUTO: 4.07 THOUSANDS/ΜL (ref 1.85–7.62)
NEUTS SEG NFR BLD AUTO: 54 % (ref 43–75)
NONHDLC SERPL-MCNC: 131 MG/DL
NRBC BLD AUTO-RTO: 0 /100 WBCS
PLATELET # BLD AUTO: 337 THOUSANDS/UL (ref 149–390)
PMV BLD AUTO: 9.2 FL (ref 8.9–12.7)
POTASSIUM SERPL-SCNC: 4.1 MMOL/L (ref 3.5–5.3)
PROT SERPL-MCNC: 7.2 G/DL (ref 6.4–8.2)
RBC # BLD AUTO: 4.93 MILLION/UL (ref 3.81–5.12)
SODIUM SERPL-SCNC: 141 MMOL/L (ref 136–145)
TRIGL SERPL-MCNC: 173 MG/DL
TSH SERPL DL<=0.05 MIU/L-ACNC: 1.42 UIU/ML (ref 0.36–3.74)
WBC # BLD AUTO: 7.68 THOUSAND/UL (ref 4.31–10.16)

## 2021-02-25 PROCEDURE — 36415 COLL VENOUS BLD VENIPUNCTURE: CPT

## 2021-02-25 PROCEDURE — 80053 COMPREHEN METABOLIC PANEL: CPT

## 2021-02-25 PROCEDURE — 84443 ASSAY THYROID STIM HORMONE: CPT

## 2021-02-25 PROCEDURE — 82306 VITAMIN D 25 HYDROXY: CPT

## 2021-02-25 PROCEDURE — 85025 COMPLETE CBC W/AUTO DIFF WBC: CPT

## 2021-02-25 PROCEDURE — 80061 LIPID PANEL: CPT

## 2021-02-25 PROCEDURE — 83036 HEMOGLOBIN GLYCOSYLATED A1C: CPT

## 2021-02-25 NOTE — PROGRESS NOTES
Assessment/Plan:    No problem-specific Assessment & Plan notes found for this encounter  Diagnoses and all orders for this visit:    Chronic fatigue    Hypothyroidism, unspecified type  -     TSH, 3rd generation; Future    Impaired glucose tolerance  -     Comprehensive metabolic panel; Future  -     HEMOGLOBIN A1C W/ EAG ESTIMATION; Future    Essential thrombocytosis (HCC)  -     CBC and differential; Future    Mixed hyperlipidemia  -     Comprehensive metabolic panel; Future  -     Lipid panel; Future    Well female exam with routine gynecological exam  -     Ambulatory referral to Gynecology; Future    Vitamin D deficiency  -     Vitamin D 25 hydroxy; Future        Orders and recommendations as noted above  Reviewed her symptoms with her  She has not had laboratory testing done in quite a while  Some of her symptoms may be related to her having the COVID vaccines recently but most likely related to other causes  She does have a strong family history of diabetes  Will check a blood sugar  Previous blood sugar was mildly elevated  Will check hemoglobin A1c as well  Has not had a TSH done for over a year  Under active thyroid may be what is causing some of her symptoms  Anemia less likely especially since she does not appear that significantly anemic  Coronavirus unlikely since she gets tested twice a week at work  Routine laboratory testing as noted above since it has been quite awhile since she has had this done  May require further testing depending on the results of this  Potentially sleep study if no other cause is found  No recent medication changes so it is unlikely related to medications  When her laboratory testing is completed, will set her up for a routine visit  She is also due for a follow-up diagnostic mammo  Order is in the system  Subjective:      Patient ID: Delma Sheridan is a 61 y o  female  She presents for problem visit    She has been feeling poorly for close to a month  She received her 2nd coronavirus vaccine on January 27th  Thereafter she began to have headaches, dizziness, and fatigue  Initially she felt it was related to the coronavirus vaccine but these symptoms have persisted  She has been feeling exhausted  Sleeps as much as possible but still feels exhausted  Has been having significant pain into her arms and shoulder areas  Feels very achy overall  Works night shift but has been doing this for years and does not have side effects from this in the past   Never felt like this prior  She is concerned about potential diabetes especially with her strong family history  She also is concerned that this could be related to her thyroid since she has not had recent laboratory testing  Appetite is somewhat decreased  Denies any nausea, vomiting, or diarrhea  Denies any chest pain or palpitations  Denies any significant shortness of breath  Still feels lightheaded at times  Worse with changes in position  Has been gaining weight despite not changing diet  The following portions of the patient's history were reviewed and updated as appropriate:   She  has a past medical history of Bipolar 1 disorder ([facility] [address] ), Disease of thyroid gland, and Thyroid cancer ([facility] )    She   Patient Active Problem List    Diagnosis Date Noted    Chronic fatigue 02/24/2021    Myalgia 02/24/2021    Impaired glucose tolerance 02/24/2021    Benign neoplasm of pituitary gland and craniopharyngeal duct (pouch) ([facility] [address] ) 03/25/2020    Dysphagia 03/25/2020    Dysphonia 03/25/2020    Essential thrombocytosis ([facility] ) 03/25/2020    Neoplasm of bone, soft tissue, and skin 03/25/2020    Osteoarthritis 03/25/2020    Nicotine dependence, unspecified, uncomplicated 99/34/4260    Other abnormal glucose 03/25/2020    Pure hypercholesterolemia, unspecified 03/25/2020    Solitary pulmonary nodule 03/25/2020    BMI 31 0-31 9,adult 08/19/2019    Screening for cervical cancer 08/19/2019    Screening for colon cancer 08/19/2019    Former smoker 08/19/2019    Mixed hyperlipidemia 02/13/2019    Other constipation 02/13/2019    Postmenopausal 02/13/2019    Callous ulcer ([facility] ) 02/13/2019    Postoperative hypothyroidism 03/09/2018    [facility], [facility] 03/09/2018    Stiffness of multiple joints 03/09/2018    Thyroid nodule 07/29/2015    Hypothyroidism 02/02/2010    Allergic rhinitis 04/21/2009     She  has a past surgical history that includes Shoulder surgery; Bladder neck reconstruction; Thyroidectomy, partial; Hysterectomy; Bladder suspension; and Rotator cuff repair (Bilateral)  Her family history includes Brain cancer in her maternal aunt; Breast cancer (age of onset: 61) in her maternal aunt; No Known Problems in her father, mother, paternal aunt, sister, sister, and sister; Throat cancer in her maternal grandfather  She  reports that she has quit smoking  Her smoking use included cigarettes  She smoked 1 00 pack per day  She has never used smokeless tobacco  She reports that she does not drink alcohol or use drugs  Current Outpatient Medications   Medication Sig Dispense Refill    benzonatate (TESSALON PERLES) 100 mg capsule Take 1 capsule (100 mg total) by mouth 3 (three) times a day as needed for cough 20 capsule 0    calcium carbonate-vitamin D (OSCAL-D) 500 mg-200 units per tablet Take 1 tablet by mouth daily with breakfast 90 tablet 4    ibuprofen (MOTRIN) 800 mg tablet Take 1 tablet (800 mg total) by mouth every 8 (eight) hours as needed for mild pain or moderate pain for up to 5 days 20 tablet 0    lamoTRIgine (LaMICtal) 150 MG tablet Take 150 mg by mouth daily      ranitidine (ZANTAC) 150 MG capsule Take 1 capsule (150 mg total) by mouth 2 (two) times a day 30 capsule 1    Synthroid 100 MCG tablet Take 1 tablet (100 mcg total) by mouth daily 90 tablet 3     No current facility-administered medications for this visit        Current Outpatient Medications on File Prior to Visit   Medication Sig    benzonatate (TESSALON PERLES) 100 mg capsule Take 1 capsule (100 mg total) by mouth 3 (three) times a day as needed for cough    calcium carbonate-vitamin D (OSCAL-D) 500 mg-200 units per tablet Take 1 tablet by mouth daily with breakfast    ibuprofen (MOTRIN) 800 mg tablet Take 1 tablet (800 mg total) by mouth every 8 (eight) hours as needed for mild pain or moderate pain for up to 5 days    lamoTRIgine (LaMICtal) 150 MG tablet Take 150 mg by mouth daily    ranitidine (ZANTAC) 150 MG capsule Take 1 capsule (150 mg total) by mouth 2 (two) times a day    Synthroid 100 MCG tablet Take 1 tablet (100 mcg total) by mouth daily     No current facility-administered medications on file prior to visit  She is allergic to demerol [meperidine] and latex       Review of Systems   Constitutional: Positive for activity change, appetite change and fatigue  Negative for chills and fever  HENT: Negative for congestion and rhinorrhea  Eyes: Negative for visual disturbance  Respiratory: Negative for cough, chest tightness and shortness of breath  Cardiovascular: Negative for chest pain and palpitations  Gastrointestinal: Positive for nausea  Negative for abdominal pain, blood in stool, diarrhea and vomiting  Endocrine: Negative for polydipsia, polyphagia and polyuria  Genitourinary: Negative for dysuria, frequency and urgency  Musculoskeletal: Positive for arthralgias, myalgias and neck stiffness  Negative for gait problem  Skin: Negative for color change  Neurological: Positive for dizziness, light-headedness and headaches  Hematological: Does not bruise/bleed easily  Psychiatric/Behavioral: Positive for decreased concentration  Negative for confusion and sleep disturbance  The patient is not nervous/anxious            Objective:      /80 (BP Location: Left arm, Patient Position: Sitting, Cuff Size: Large)   Pulse 57   Temp (!) 97 2 °F (36 2 °C)   Ht 5' 9" (1 753 m)   Wt 101 kg (223 lb 9 6 oz)   SpO2 95%   BMI 33 02 kg/m²          Physical Exam  Vitals signs and nursing note reviewed  Constitutional:       General: She is not in acute distress  Appearance: She is well-developed, well-groomed and overweight  HENT:      Head: Normocephalic and atraumatic  Eyes:      General:         Right eye: No discharge  Left eye: No discharge  Conjunctiva/sclera: Conjunctivae normal       Pupils: Pupils are equal, round, and reactive to light  Cardiovascular:      Rate and Rhythm: Normal rate and regular rhythm  Heart sounds: Normal heart sounds  No murmur  No friction rub  No gallop  Pulmonary:      Effort: No respiratory distress  Breath sounds: Decreased breath sounds present  No wheezing or rales  Abdominal:      General: Bowel sounds are normal  There is no distension  Tenderness: There is no abdominal tenderness  Musculoskeletal:      Comments: Diffuse tenderness over the upper arms and shoulder blade areas   Lymphadenopathy:      Cervical: No cervical adenopathy  Skin:     General: Skin is warm and dry  Neurological:      General: No focal deficit present  Mental Status: She is alert and oriented to person, place, and time  Psychiatric:         Mood and Affect: Mood and affect normal          Behavior: Behavior normal  Behavior is cooperative  Comments:  Tired appearing         BMI Counseling: Body mass index is 33 02 kg/m²  The BMI is above normal  Nutrition recommendations include encouraging healthy choices of fruits and vegetables, consuming healthier snacks, limiting drinks that contain sugar, moderation in carbohydrate intake and reducing intake of cholesterol  Exercise recommendations include exercising 3-5 times per week

## 2021-03-01 ENCOUNTER — TELEPHONE (OUTPATIENT)
Dept: INTERNAL MEDICINE CLINIC | Facility: CLINIC | Age: 56
End: 2021-03-01

## 2021-03-01 NOTE — TELEPHONE ENCOUNTER
Pt was looking for hr labs  She did not know if she needed a f/u appt  If someone calls with labs, she is giving permission to talk to her spouse  She works 3rd shift and maybe sleeping

## 2021-03-10 NOTE — TELEPHONE ENCOUNTER
Blood work with no significant abnormalities except for a low vitamin-D level  High-dose vitamin-D on a weekly basis would be indicated  This, however, does not likely explain her overwhelming fatigue and other symptoms

## 2021-05-05 ENCOUNTER — TELEPHONE (OUTPATIENT)
Dept: INTERNAL MEDICINE CLINIC | Facility: CLINIC | Age: 56
End: 2021-05-05

## 2021-05-05 DIAGNOSIS — M54.2 SORE NECK: ICD-10-CM

## 2021-05-05 DIAGNOSIS — E04.1 THYROID NODULE: Primary | ICD-10-CM

## 2021-05-05 DIAGNOSIS — E55.9 VITAMIN D DEFICIENCY: ICD-10-CM

## 2021-05-05 NOTE — TELEPHONE ENCOUNTER
Patient called with concerns  She stated she was in back in January and was tired- she now c/o of tired, hard time swallowing, sore neck, swelling of the neck, voice changes  Per ellie can order TSH and US soft tissue head/neck

## 2021-05-10 ENCOUNTER — HOSPITAL ENCOUNTER (OUTPATIENT)
Dept: ULTRASOUND IMAGING | Facility: HOSPITAL | Age: 56
Discharge: HOME | End: 2021-05-10
Payer: COMMERCIAL

## 2021-05-10 DIAGNOSIS — M54.2 SORE NECK: ICD-10-CM

## 2021-05-10 DIAGNOSIS — E04.1 THYROID NODULE: ICD-10-CM

## 2021-05-10 PROCEDURE — 76536 US EXAM OF HEAD AND NECK: CPT

## 2021-06-08 ENCOUNTER — EVALUATION (OUTPATIENT)
Dept: SPEECH THERAPY | Facility: CLINIC | Age: 56
End: 2021-06-08
Payer: COMMERCIAL

## 2021-06-08 DIAGNOSIS — R49.0 MUSCLE TENSION DYSPHONIA: ICD-10-CM

## 2021-06-08 DIAGNOSIS — J38.3 PARADOXICAL VOCAL CORD MOTION: Primary | ICD-10-CM

## 2021-06-08 PROCEDURE — 92524 BEHAVRAL QUALIT ANALYS VOICE: CPT

## 2021-06-08 NOTE — PROGRESS NOTES
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  Patient complaining of voice and swallowing changes noticed after intubation for a shoulder surgery in 2017  It should be noted that patient has had multiple intubations as a result of 6+ surgeries  Patient reports losing her voice, tension in her throat, xerostomia, nasal voice, wheezing, and a soreness in the throat but "different from a sore throat"  She feels as though she has always had a problem but that it has worsened  Her voice deteriotates with use throughout the day, especially at work  She describes herself as "loud" and sings in the [employer] choir  In regard to swallowing, patient is reporting a feeling of certain foods (rice, bread, toast, carrots, pineapple) getting stuck and is experiencing pain  This sensation when swallowing reminds her of when she had a thyroid neoplasm leading her to undergo R  Adriano thyroidectomy in the 1980s  She uses the strategy of drinking water while eating but feels she should be taking more sips to wash food down  Additionally, she is experiencing some GERD which patient described as "nothing serious"  She also reports feeling like she isn't able to take a full breath, describing this as the feeling of an unfinished yawn and noted that symptoms worsen when laying down  Patient reports pain and shortness of breath/stopping breathing over night if her head is extended   Patient believes her voice disorder may be due to increased exposure to chemicals in the small COVID rooms  It should be noted that portions of the patient's medical timeline were difficult to follow  Ultrasound of thyroid 5/10/21 results compared to previous [address] [facility] from 3/2017:  FINDINGS:  Patient is status post right thyroidectomy  The left thyroid lobe measures 3 6 x 1 4 x 1 4 cm and is diffusely heterogeneous  Isthmus thickness 0 2 cm  Nodule #1  Image 23  LEFT lower pole nodule measuring 0 6 x 0 4 x 0 7 cm  Previously 0 6 x 0 5 x 0 6 cm   COMPOSITION:  2 points, solid or almost completely solid   ECHOGENICITY:  1 point, hyperechoic or isoechoic  SHAPE:  0 points, wider-than-tall  MARGIN: 0 points, smooth  ECHOGENIC FOCI:  0 points, none or large comet-tail artifacts  TI-RADS Classification: TR 3 (3 points), FNA if >2 5 cm  Follow if >1 5 cm  Subcentimeter left lower pole thyroid nodule without interval change  There are no nodules requiring fine-needle aspiration and overall, no significant change since prior study "     Safety Measures:  Patient reports she is safe at home  Reason for Referral:Change in vocal quality and Difficulty swallowing solids or liquids  Prior Functional Status:Communication effective and appropriate in all situations     Medical History significant for:   Past Medical History:   Diagnosis Date    Bipolar 1 disorder ([address] )     Disease of thyroid gland     Thyroid cancer ([address] ) 1987     Hearing:Other Feeling of something in ears moving, buzzing, clicking when cleaning; inconsistent      Vision:Other Eyes go blurry, double vision a lot, eye twitching; has worsened, had eye surgery years ago, PCP mentioned cataracts, eye doctor cleared her last year  Has eye appt  in January      Medication List:   Current Outpatient Medications   Medication Sig Dispense Refill    benzonatate (TESSALON PERLES) 100 mg capsule Take 1 capsule (100 mg total) by mouth 3 (three) times a day as needed for cough (Patient not taking: Reported on 5/26/2021) 20 capsule 0    calcium carbonate-vitamin D (OSCAL-D) 500 mg-200 units per tablet Take 1 tablet by mouth daily with breakfast (Patient not taking: Reported on 5/26/2021) 90 tablet 4    fluticasone (FLONASE) 50 mcg/act nasal spray 2 sprays into each nostril daily 16 g 11    ibuprofen (MOTRIN) 800 mg tablet Take 1 tablet (800 mg total) by mouth every 8 (eight) hours as needed for mild pain or moderate pain for up to 5 days 20 tablet 0    lamoTRIgine (LaMICtal) 150 MG tablet Take 150 mg by mouth daily      ranitidine (ZANTAC) 150 MG capsule Take 1 capsule (150 mg total) by mouth 2 (two) times a day (Patient not taking: Reported on 5/26/2021) 30 capsule 1    Synthroid 100 MCG tablet Take 1 tablet (100 mcg total) by mouth daily 90 tablet 3     No current facility-administered medications for this visit  Allergies: Allergies   Allergen Reactions    Demerol [Meperidine]     Latex Rash     Primary Language: English  Preferred Language: English   Home Environment/ Lifestyle: My   Occupation: [employer]  Highest Level of Education: GED     Mental Status: Alert  Behavior Status:Cooperative  Communication Modalities: Verbal  Recent Speech/ Language therapy:None  Rehabilitation Prognosis:Good rehab potential to reach and maintain prior level of function    VOICE EVALUATION:  -Chief complaint: Changes in voice (hoarseness, losing voice, tightness in throat, nasal voice, wheezing, xerostomia)  -Onset: Gradual (first noticed after shoulder surgery, 2017)  -Voice history: Allergies, Post nasal drip, Heartburn, Thyroid problems, Reflux and Dysphagia    -Occupational/vocal demands: Uses voice a lot at work  Reports losing voice while singing in the choir  She doesn't talk much at home   -Water intake: Water intake throughout night shifts  Also mentioned drinking a lot of seltzer    -Caffeine intake: Drinks about 3-5 cups of coffee per day    -Alcohol intake: N/A  -Smoking?: Former (2017),  smokes  -Throat clearing/coughing?: Coughing more than usual but describe catrwright "nothing major"; clearing throat a lot now  -Previous voice tx?: N/A  -Other History: Intubated during surgery, 4 surgeries on shoulders (0617-9156); Post surgery experienced coughing, "icky" taste, "stickiness"      1  Voice Handicap Index (VHI): The VHI is a list of 30 statements that many people have used to describe their voices and the effects of their voices on their lives  Patient indicated how frequently she has the same experience using a rating point scale (never = 0, almost never = 1, sometimes = 2, almost always = 3, and always = 4)  Patient's results were as follows:    Subscale: Score: Self-Perceived Impairment Level:   Physical 10/40 Mild   Functional 22/40 Moderate   Emotional 5/40 Mild        TOTAL 37/120 Mild       PERCEPTUAL VOICE ASSESSMENT:    2  Consensus Auditory Perceptual Evaluation of Voice (CAPE-V): The CAPE-V rates auditory-perceptual qualities of a patients voice  The overall severity, roughness, breathiness, strain, pitch and loudness are rated during several tasks including general conversation, vowel prolongation, and reading of sentences  Patient also read the [place] to assess the coordination of respiration and voice production  The ratings of the abovementioned parameters were plotted on a 100-millimeter scale, with 0 corresponding to typical normal voice and 100 indicating a severely deviant voice  Parameter: Rating: Severity & Perceptual Observations:   *Overall Severity  80/100 Severe   Roughness 85/100 Severe   Breathiness 30/100 Mild   Strain 80/100 Severe   Pitch 60/100 Moderate   Loudness 75/100 Moderate   Focus of Resonance  Moderate        RESPIRATORY EFFICIENCY:   3  S:Z Ratio Task: Patient was instructed to sustain the sounds /s/ and /z/ across three trials to examine the coordination and efficiency of respiration and voice production   Normative data suggests that adults can prolong these sounds for 20-25 seconds  Ratios of 1 4 and above are consistent with laryngeal inefficiency, and ratios of 2 0 and above are suggestive of vocal fold pathology  Task: Trial 1 (sec): Trial 2 (sec): Trial 3 (sec):   /s/ 10 15 7 85 6 9   /z/ 9 17 84 15 8     Best /s/: 10 15  Best /z/: 17 84      Ratio:   57       4  Maximum Phonation Time: Patient was instructed to sustain the sound /ah/ across three trials to measure respiratory and laryngeal coordination and efficiency  Adults are typically able to prolong these vowels sound for 15-20 seconds  Reduced MPT may suggest poor respiratory support, or poor medial glottal closure  Trial 1 (sec): Trial 2 (sec): Trial 3 (sec):   13 5 15 2 20 14     Average Duration: 16 28 seconds    Patient was educated on various vocal abuse behaviors and vocal hygiene throughout assessment  Vocal abuses included decreased water and increased caffeine intake, coughing and throat-clearing, thoracic/clavicular breathing, straining voice, whispering  Vocal hygiene strategies included increased water intake, decreased caffeine intake, throat-clearing awareness, increased breath support/diaphragmatic breathing, compensatory strategies to minimize vocal abuses during work day, confidential voice  Patient was agreeable  Goals  Short Term Goals:  1  Patient will complete MFR evaluation to determine MTD role in vocal quality  2  Patient will be educated on vocal hygiene and ways to improve VF health through strategies  3  Patient will be educated on vocal abuse behaviors and ways to improve VF safety in speaking and non speaking environments  4  Patient will report increased water intake to ensure hydration  5  Patient will be educated on diaphragmatic breathing to ensure proper breath support  6  Patient will complete formal swallowing evaluation to determine safest PO intake    7  Patient will be educated on GERD diet modifications/medication through GI care   8  Patient will work to ensure optimal ventilation both at home and in work settings to decrease irritant exposure  9  Patient will complete hearing screener to determine need for audiology consultation  10  Patient will be educated on PVFMD rescue techniques to improve restorative breath  11  Patient will undergo a sleep study to determine sleep quality and overall health per symptoms  Long Term Goals:  1  Patient will improve overall vocal quality  2  Patient will utilize provided strategies and education to increase safety of swallow  Impressions/ Recommendations  Patient presented with a voice disorder characterized by roughness, strain, loudness, hypernasality, pitch variation, and tension/pain in the throat area  She began to notice her voice symptoms after a shoulder surgery in 2017  She reported engaging in a variety of vocal abuse behaviors including loudness, throat clearing, increased caffeine intake, and high vocal demand (singing, work)  Patient has been intubated 6+ times for various surgical procedures, potentially causing damage to the vocal folds  She is also experiencing GERD, described by patient as "nothing serious"  Measurement of maximum phonation time was WNL (16 28 seconds) and measument of s/z ratio was WNL (0 57)  These measurements do not align with perceptual rating of client's vocal quality, as poor respiratory support was observed and laryngeal insufficiency & vocal pathology are suspected  Patient also reports difficulty with swallowing characterized by certain foods feeling "stuck"  She reported difficulty with bread, toast, pineapples, carrots, and rice  Drinking water intermittently during meals seems to help alleviate the sensation of feeling stuck   Patient unable to verbalize if she knows or believes that she has had thyroid cancer in the past  She reported in part that she believed her entire thyroid was removed in the 1980s following a concerning "lump " However, recent ultrasound and imaging revealing that she only underwent a hemithyroidectomy at that time  Presently her thyroid US does show a nodule but patient reports decreased concerns for cancer at this time    She reports that her scar tissue "is wrapped around her throat and they aren't going to operate on it "    Recommendations:   Patients would benefit from: Voice therapy and Dysphagia therapy   Frequency:1-2x weekly   Duration:4-5 weeks    Intervention certification from:  3/5/9765  Intervention certification to:  6/0/5178  Intervention Comments: Vocal hygiene education, compensatory strategies for swallowing    Visit # 1

## 2021-06-15 ENCOUNTER — OFFICE VISIT (OUTPATIENT)
Dept: SPEECH THERAPY | Facility: CLINIC | Age: 56
End: 2021-06-15
Payer: COMMERCIAL

## 2021-06-15 DIAGNOSIS — R49.0 MUSCLE TENSION DYSPHONIA: ICD-10-CM

## 2021-06-15 DIAGNOSIS — J38.3 PARADOXICAL VOCAL CORD MOTION: Primary | ICD-10-CM

## 2021-06-15 PROCEDURE — 92507 TX SP LANG VOICE COMM INDIV: CPT

## 2021-06-15 NOTE — PROGRESS NOTES
Speech-Language Pathology Treatment Note    Today's date: 6/15/2021  Patient name: Ijeoma Medina  : 1965  MRN: [id number]  Referring provider: Jorge Elias DO  Dx:   Encounter Diagnosis     ICD-10-CM    1  Paradoxical vocal cord motion  J38 3    2  Muscle tension dysphonia  R49 0      Medical History significant for:   Past Medical History:   Diagnosis Date    [facility] ([facility] [address] [facility]     Disease of thyroid gland     Thyroid cancer ([facility] [address] )      Flowsheet:  Start Time: 1600  Stop Time: 1700  Total time in clinic (min): 60 minutes    Subjective:  Patient arrived on time to her appointment today, attending i'ly  Patient will be evaluated today for MFR needs 2' MTD diagnosis and presentation of vocal quality  Objective:  1  Patient will complete MFR evaluation to determine MTD role in vocal quality  MFR stretches 6/15 30 min       Facial stretches        suprahyoid Completed ( tension but able to tolerate)       anterior cervical region ( holds, glides, key pinch  elongation/tranverse) Completed        infrahyoid Complete ( difficulty tolerating)        Lateral sides of neck Completed R/L ( voice better HT to left- >tension on left)        Hyoid region        Submandibular ( holds and key pinch  elongation/transverse)        Lingual          2  Patient will be educated on vocal hygiene and ways to improve VF health through strategies  6/15:  Education and hand out provided, patient verbalizing her understanding and agreement to increase these recommended strategies  3  Patient will be educated on vocal abuse behaviors and ways to improve VF safety in speaking and non speaking environments  6/15:  Education and hand out provided, patient verbalizing her understanding and agreement to increase these recommended strategies  4  Patient will report increased water intake to ensure hydration    6/15:  Education and hand out provided, patient verbalizing her understanding and agreement to increase these recommended strategies  5  Patient will be educated on diaphragmatic breathing to ensure proper breath support  6/15:  Education and hand out provided, patient verbalizing her understanding and agreement to increase these recommended strategies  6  Patient will complete formal swallowing evaluation to determine safest PO intake  7  Patient will be educated on GERD diet modifications/medication through GI care  6/15:  Education and hand out provided, patient verbalizing her understanding and agreement to increase these recommended strategies  8  Patient will work to ensure optimal ventilation both at home and in work settings to decrease irritant exposure  9  Patient will complete hearing screener to determine need for audiology consultation  10  Patient will be educated on PVFMD rescue techniques to improve restorative breath  11  Patient will undergo a sleep study to determine sleep quality and overall health per symptoms  Long Term Goals:  1  Patient will improve overall vocal quality  2  Patient will utilize provided strategies and education to increase safety of swallow  Assessment:  Patient did very well in therapy today, demonstrating verbal understanding of education provided      Plan:  Recommendations:   Patients would benefit from: Voice therapy and Dysphagia therapy   Frequency:1-2x weekly   Duration:4-5 weeks    Intervention certification from:  9/0/7032  Intervention certification to:  5/8/3682  Intervention Comments: Vocal hygiene education, compensatory strategies for swallowing    Visit # 2    Homework:

## 2021-06-24 ENCOUNTER — OFFICE VISIT (OUTPATIENT)
Dept: SPEECH THERAPY | Facility: CLINIC | Age: 56
End: 2021-06-24
Payer: COMMERCIAL

## 2021-06-24 DIAGNOSIS — J38.3 PARADOXICAL VOCAL CORD MOTION: Primary | ICD-10-CM

## 2021-06-24 PROCEDURE — 92507 TX SP LANG VOICE COMM INDIV: CPT

## 2021-06-24 NOTE — PROGRESS NOTES
Speech-Language Pathology Treatment Note    Today's date: 2021  Patient name: Jey Douglas  : 1965  MRN: [id number]  Referring provider: Zafar Aaron DO  Dx:   Encounter Diagnosis     ICD-10-CM    1  Paradoxical vocal cord motion  J38 3      Medical History significant for:   Past Medical History:   Diagnosis Date    [facility] disorder [facility] [address] [facility]     Disease of thyroid gland     Thyroid cancer ([facility] [address] )      Flowsheet:  Start Time: 1600  Stop Time: 1700  Total time in clinic (min): 60 minutes    Subjective:  Patient arrived on time to her appointment today, attending i'ly  She reports having been up since 2pm today, coming to the end of her work week  She indicated that she had "discomfort" last week following MFR she rates it a "6 "    Objective:  1  Patient will complete MFR evaluation to determine MTD role in vocal quality  MFR stretches 6/15 30 min       Facial stretches        suprahyoid Completed ( tension but able to tolerate)       anterior cervical region ( holds, glides, key pinch  elongation/tranverse) Completed        infrahyoid Complete ( difficulty tolerating)        Lateral sides of neck Completed R/L ( voice better HT to left- >tension on left)        Hyoid region        Submandibular ( holds and key pinch  elongation/transverse)        Lingual          2  Patient will be educated on vocal hygiene and ways to improve VF health through strategies  6/15:  Education and hand out provided, patient verbalizing her understanding and agreement to increase these recommended strategies  3  Patient will be educated on vocal abuse behaviors and ways to improve VF safety in speaking and non speaking environments  6/15:  Education and hand out provided, patient verbalizing her understanding and agreement to increase these recommended strategies  4  Patient will report increased water intake to ensure hydration    6/15:  Education and hand out provided, patient verbalizing her understanding and agreement to increase these recommended strategies  6/24:  She reports increased water intake ~60 oz throughout the day  5  Patient will be educated on diaphragmatic breathing to ensure proper breath support  6/15:  Education and hand out provided, patient verbalizing her understanding and agreement to increase these recommended strategies  6  Patient will complete formal swallowing evaluation to determine safest PO intake  7  Patient will be educated on GERD diet modifications/medication through GI care  6/15:  Education and hand out provided, patient verbalizing her understanding and agreement to increase these recommended strategies  6/24:  Patient reports that she was diagnosed with a "hiatal hernia" in her teens, she smoked a single cigarette and immediately had distress/pain breathing  She reports decreasing spicy foods  She has stopped taking Pepcid feeling like it didn't help her symptoms  She has never seen a GI specialist to her knowledge but does believe she had a barium swallow study in [id number] at [facility]  She cont to describe symptoms similar to esophageal spasm  She went to the ED as recently as 2018 for this same symptom  8  Patient will work to ensure optimal ventilation both at home and in work settings to decrease irritant exposure  6/24:  Patient reports increased exposure at this time 2' number of traumas called but she does wear the N95 more often  She has not noticed a difference in her breathing with this change  9  Patient will complete hearing screener to determine need for audiology consultation  10  Patient will be educated on PVFMD rescue techniques to improve restorative breath  11  Patient will undergo a sleep study to determine sleep quality and overall health per symptoms  Long Term Goals:  1  Patient will improve overall vocal quality    2  Patient will utilize provided strategies and education to increase safety of swallow  Assessment:  Patient did very well in therapy today, long discussion and education provided with concerns of GI complications potentially hiatal hernia, stricture, esophageal spasm, etc   Her symptoms coordinate with GI complications      Plan:  Recommendations:   Patients would benefit from: Voice therapy and Dysphagia therapy   Frequency:1-2x weekly   Duration:4-5 weeks    Intervention certification from:  9/5/2312  Intervention certification to:  0/4/4933  Intervention Comments: Vocal hygiene education, compensatory strategies for swallowing    Visit # 3    Homework:

## 2021-06-28 ENCOUNTER — APPOINTMENT (OUTPATIENT)
Dept: SPEECH THERAPY | Facility: CLINIC | Age: 56
End: 2021-06-28
Payer: COMMERCIAL

## 2021-06-29 ENCOUNTER — OFFICE VISIT (OUTPATIENT)
Dept: SPEECH THERAPY | Facility: CLINIC | Age: 56
End: 2021-06-29
Payer: COMMERCIAL

## 2021-06-29 DIAGNOSIS — J38.3 PARADOXICAL VOCAL CORD MOTION: Primary | ICD-10-CM

## 2021-06-29 PROCEDURE — 92610 EVALUATE SWALLOWING FUNCTION: CPT

## 2021-06-29 NOTE — PROGRESS NOTES
Speech-Language Pathology Treatment Note    Today's date: 2021  Patient name: Rosa Chavarria  : 1965  MRN: [id number]  Referring provider: Ronald Linares DO  Dx:   Encounter Diagnosis     ICD-10-CM    1  Paradoxical vocal cord motion  J38 3      Medical History significant for:   Past Medical History:   Diagnosis Date    [facility] [address] [facility]     Disease of thyroid gland     Thyroid cancer ([facility] [address] )      Flowsheet:  Start Time: 1600  Stop Time: 1700  Total time in clinic (min): 60 minutes    Subjective:  Patient arrived on time to her appointment today, attending i'ly  She reports having to cancel yesterday 2' fatigue and her schedule being "off "  Patient has appt 21 with GI Dr Fong Resides  She reports that she has discovered she has to be "mindful" about her breathing pattern but has been compliant using her exercises  Water intake ~65 oz including 3 styrofoam cups and 1 james jar worth  Her breathing pattern has changed upwards of 4-5x per day for ~30 minutes  Objective:  1  Patient will complete MFR evaluation to determine MTD role in vocal quality  MFR stretches 6/15 30 min       Facial stretches        suprahyoid Completed ( tension but able to tolerate)       anterior cervical region ( holds, glides, key pinch  elongation/tranverse) Completed        infrahyoid Complete ( difficulty tolerating)        Lateral sides of neck Completed R/L ( voice better HT to left- >tension on left)        Hyoid region        Submandibular ( holds and key pinch  elongation/transverse)        Lingual          2  Patient will be educated on vocal hygiene and ways to improve VF health through strategies  6/15:  Education and hand out provided, patient verbalizing her understanding and agreement to increase these recommended strategies  :  Patient reports decreased speaking in work environments which has helped, and she doesn't speak often at home      3  Patient will be educated on vocal abuse behaviors and ways to improve VF safety in speaking and non speaking environments  6/15:  Education and hand out provided, patient verbalizing her understanding and agreement to increase these recommended strategies  4  Patient will report increased water intake to ensure hydration  6/15:  Education and hand out provided, patient verbalizing her understanding and agreement to increase these recommended strategies  :  She reports increased water intake ~60 oz throughout the day  :  Water exceeding 65+ oz     5  Patient will be educated on diaphragmatic breathing to ensure proper breath support  6/15:  Education and hand out provided, patient verbalizing her understanding and agreement to increase these recommended strategies  :  Patient reporting appropriate breathing technique 4-5x per day  6  Patient will complete formal swallowing evaluation to determine safest PO intake  :  DYSPHAGIA EVALUATION:  -Reason for referral: Signs/symptoms of dysphagia and sensations in throat  -Subjective report of swallowing difficulty: Coughing, Pain with swallowing and Globus sensation   -Difficulty swallowing: Solids and Pills  -Current diet (solids): Regular  -Current diet (liquids): Thin  -Alternative Feeding Method?: No    -Facial appearance Symmetrical   -Dentition Adequate   -Labial function WFL   -Lingual function WFL   -Velar function Symmetrical       LIQUID CONSISTENCY TESTIN  Liquid Consistency (Thin) - water   Administered by: Cup   Strategies, attempts, and responses: None and Chin Tuck no change in residue/globus sensation, potential mild change    CLINICAL FINDINGS:   Oral phase impairments: WFL   Pharyngeal Phase Impairments: WFL    *Laryngeal excursion upon palpation: Appeared WFL      SOLID CONSISTENCY TESTIN   Solid Consistency (Regular and Mechanical Soft) - pretzels/cracker   Administered by: Self-fed   Strategies, attempts, and responses: None    CLINICAL FINDINGS:   Oral phase impairments: WFL   Pharyngeal Phase Impairments: Swallow initiation Mild delay and Complaints of globus sensation    *Laryngeal excursion upon palpation: Poor HLE upon palpation, decreased ROM      SWALLOWING DIAGNOSTIC IMPRESSION:  -Swallowing diagnosis/severity: mild oropharyngeal phase dysphagia    -Factors affecting performance: Other suspect GI role (hiatal hernia, stricture, GERD) and jose-thyroidectomy ~30 years ago    -Safety concerns: No limitations    -Risk factors: History of Head and Neck Cancer and GERD    SAFETY PRECAUTIONS:  -Supervision: Independent     -Strategies: Small sips and bites when eating and Slow rate, swallow between bites    -Positioning: Upright position at least 30 minutes after meal    -Compensatory strategies: Effortful swallow and Multiple swallows    -Recommend (solids): Regular    -Recommend (liquids): Thin    -Recommend (medications): Whole in puree or with thin liquid    REFERRALS: Videofluroscopic Swallow Study, Barium Swallow Study, Gastroenterology and Otolaryngology      7  Patient will be educated on GERD diet modifications/medication through GI care  6/15:  Education and hand out provided, patient verbalizing her understanding and agreement to increase these recommended strategies  6/24:  Patient reports that she was diagnosed with a "hiatal hernia" in her teens, she smoked a single cigarette and immediately had distress/pain breathing  She reports decreasing spicy foods  She has stopped taking Pepcid feeling like it didn't help her symptoms  She has never seen a GI specialist to her knowledge but does believe she had a barium swallow study in [id number] at [facility]  She cont to describe symptoms similar to esophageal spasm  She went to the ED as recently as 2018 for this same symptom    6/29:  GI appt set for 7/14     8  Patient will work to ensure optimal ventilation both at home and in work settings to decrease irritant exposure  6/24:  Patient reports increased exposure at this time 2' number of traumas called but she does wear the N95 more often  She has not noticed a difference in her breathing with this change  9  Patient will complete hearing screener to determine need for audiology consultation  10  Patient will be educated on PVFMD rescue techniques to improve restorative breath  11  Patient will undergo a sleep study to determine sleep quality and overall health per symptoms  Long Term Goals:  1  Patient will improve overall vocal quality  2  Patient will utilize provided strategies and education to increase safety of swallow  Assessment:  Patient recommended having a VBS completed to determine sensation on swallow      Plan:  Recommendations:   Patients would benefit from: Voice therapy and Dysphagia therapy   Frequency:1-2x weekly   Duration:4-5 weeks    Intervention certification from:  6/4/9745  Intervention certification to:  7/6/4649  Intervention Comments: Vocal hygiene education, compensatory strategies for swallowing    Visit # 4    Homework:

## 2021-07-01 ENCOUNTER — TELEPHONE (OUTPATIENT)
Dept: INTERNAL MEDICINE CLINIC | Facility: CLINIC | Age: 56
End: 2021-07-01

## 2021-07-01 NOTE — TELEPHONE ENCOUNTER
Rehana Amaro the speech therapist for Dixonjan[facility] Tamez asked if provider could put a script into epic for a Video Swallow for the patient

## 2021-07-06 ENCOUNTER — APPOINTMENT (OUTPATIENT)
Dept: SPEECH THERAPY | Facility: CLINIC | Age: 56
End: 2021-07-06
Payer: COMMERCIAL

## 2021-07-06 DIAGNOSIS — R13.10 DYSPHAGIA, UNSPECIFIED TYPE: Primary | ICD-10-CM

## 2021-07-12 ENCOUNTER — APPOINTMENT (OUTPATIENT)
Dept: SPEECH THERAPY | Facility: CLINIC | Age: 56
End: 2021-07-12
Payer: COMMERCIAL

## 2021-07-12 NOTE — PROGRESS NOTES
[facility] - Outpatient Consultation  Javier Hollis 61 y o  female MRN: [id number]  Encounter: [id number]          ASSESSMENT AND PLAN:      Andrew Hays is a 60 y/o female with acquired hypothyroid s/p partial thyroidectomy due to thyroid nodule, HLD, bipolar disorder here for consultation for dysphagia  1  Dysphagia  2  Heartburn    Barium swallow with speech already ordered  Pt says she has been having intermittent dysphagia to solid foods only; this occurs daily but not every muriel she eats  Denies: odynophagia, choking, regurgitation, vomiting  Pt also admits to intermittent heartburn but says she cannot "figure out" what foods cause this  -explained to pt that she should get barium swallow w speech done prior to EGD to monitor for nay oropharyngeal component   -EGD ordered to rule out EOE, candida, stricture/narrowing or obstruction  -start omeprazole 40 mg daily half an hour before first meal due to intermittent heartburn and bc dysphagia can be caused by uncontrolled reflux   -anti-gerd diet printed for her  -due to hx of partial thyroidectomy, if above tests WNL, pt may benefit from cervical imaging to rule out extrinsic compression and/or nerve damage   -food journal to document what foods have been causing dysphagia     3  Dysphonia   Pt sees speech therapy for dysphonia  She says this began after her partial thyroidectomy  maintain follow-up with ENT and speech    4  Colon cancer screening   Pt believes her last colon was 10+ years ago but does not want once at this time    -educated pt about importance of colon cancer screening but she continues to defer at this moment; she says she will do cologuard from PCP    ______________________________________________________________________    HPI:  Andrew Hays is a 60 y/o female with acquired hypothyroid s/p partial thyroidectomy due to thyroid nodule,, HLD, bipolar disorder here for consultation for dysphagia   Patient says she hs been seeing speech therapy and ENT for dysphonia and has recently started to notice dysphagia to solid foods intermittently, so she was suggested to get an EGD done  She says that the dysphagia occurs daily but it dos not happen every time she eats solids; she cannot identify any specific foods that cause it as it is random  She also has associated heartburn but says that she is not sure what foods cause this, either  She otherwise denies any choking, regurgitation, vomiting, odynophagia, epigastric pain, lower abdominal pain, changes in BMs  Patient believes her last colon was 10 + years ago but is not interested in one at this time  She denies unintentional weight loss, fevers, chills, bloody or black stools  REVIEW OF SYSTEMS:    CONSTITUTIONAL: Denies any fever, chills, rigors, and weight loss  HEENT: No earache or tinnitus  Denies hearing loss or visual disturbances  CARDIOVASCULAR: No chest pain or palpitations  RESPIRATORY: Denies any cough, hemoptysis, shortness of breath or dyspnea on exertion  GASTROINTESTINAL: As noted in the History of Present Illness  GENITOURINARY: No problems with urination  Denies any hematuria or dysuria  NEUROLOGIC: No dizziness or vertigo, denies headaches  MUSCULOSKELETAL: Denies any muscle or joint pain  SKIN: Denies skin rashes or itching  ENDOCRINE: Denies excessive thirst  Denies intolerance to heat or cold  PSYCHOSOCIAL: Denies depression or anxiety  Denies any recent memory loss         Historical Information   Past Medical History:   Diagnosis Date    [facility]     Disease of thyroid gland     Thyroid cancer ([facility] ) 1987     Past Surgical History:   Procedure Laterality Date    BLADDER NECK RECONSTRUCTION      BLADDER SUSPENSION      HYSTERECTOMY      ROTATOR CUFF REPAIR Bilateral     done twice on each side    SHOULDER SURGERY      THYROIDECTOMY, PARTIAL       Social History   Social History     Substance and Sexual Activity Alcohol Use No     Social History     Substance and Sexual Activity   Drug Use No     Social History     Tobacco Use   Smoking Status Former Smoker    Packs/day: 1 00    Types: Cigarettes   Smokeless Tobacco Never Used     Family History   Problem Relation Age of Onset    No Known Problems Mother     No Known Problems Father     No Known Problems Sister     Throat cancer Maternal Grandfather     No Known Problems Sister     No Known Problems Sister     No Known Problems Paternal Aunt     Breast cancer Maternal Aunt 61    Brain cancer Maternal Aunt        Meds/Allergies       Current Outpatient Medications:     benzonatate (TESSALON PERLES) 100 mg capsule    calcium carbonate-vitamin D (OSCAL-D) 500 mg-200 units per tablet    fluticasone (FLONASE) 50 mcg/act nasal spray    ibuprofen (MOTRIN) 800 mg tablet    lamoTRIgine (LaMICtal) 150 MG tablet    ranitidine (ZANTAC) 150 MG capsule    Synthroid 100 MCG tablet    Allergies   Allergen Reactions    Demerol [Meperidine]     Latex Rash           Objective     There were no vitals taken for this visit  There is no height or weight on file to calculate BMI  PHYSICAL EXAM:      General Appearance:   Alert, cooperative, no distress   HEENT:   Normocephalic, atraumatic, anicteric      Neck:  Supple, symmetrical, trachea midline   Lungs:   Clear to auscultation bilaterally; no rales, rhonchi or wheezing; respirations unlabored    Heart[de-identified]   Regular rate and rhythm; no murmur, rub, or gallop  Abdomen:   Soft, non-tender, non-distended; normal bowel sounds; no masses, no organomegaly    Genitalia:   Deferred    Rectal:   Deferred    Extremities:  No cyanosis, clubbing or edema    Pulses:  2+ and symmetric    Skin:  No jaundice, rashes, or lesions    Lymph nodes:  No palpable cervical lymphadenopathy        Lab Results:   No visits with results within 1 Day(s) from this visit     Latest known visit with results is:   Lab Requisition on 04/28/2021 Component Date Value    SARS-CoV-2 04/28/2021 Negative          Radiology Results:   No results found

## 2021-07-13 ENCOUNTER — APPOINTMENT (OUTPATIENT)
Dept: SPEECH THERAPY | Facility: CLINIC | Age: 56
End: 2021-07-13
Payer: COMMERCIAL

## 2021-07-14 ENCOUNTER — OFFICE VISIT (OUTPATIENT)
Dept: GASTROENTEROLOGY | Facility: CLINIC | Age: 56
End: 2021-07-14
Payer: COMMERCIAL

## 2021-07-14 VITALS
DIASTOLIC BLOOD PRESSURE: 62 MMHG | HEIGHT: 69 IN | TEMPERATURE: 96.2 F | BODY MASS INDEX: 33.62 KG/M2 | HEART RATE: 68 BPM | RESPIRATION RATE: 16 BRPM | SYSTOLIC BLOOD PRESSURE: 104 MMHG | WEIGHT: 227 LBS

## 2021-07-14 DIAGNOSIS — R13.19 ESOPHAGEAL DYSPHAGIA: Primary | ICD-10-CM

## 2021-07-14 PROCEDURE — 99244 OFF/OP CNSLTJ NEW/EST MOD 40: CPT | Performed by: PHYSICIAN ASSISTANT

## 2021-07-14 RX ORDER — OMEPRAZOLE 40 MG/1
40 CAPSULE, DELAYED RELEASE ORAL DAILY
Qty: 30 CAPSULE | Refills: 2 | Status: SHIPPED | OUTPATIENT
Start: 2021-07-14

## 2021-07-14 RX ORDER — OMEPRAZOLE 40 MG/1
40 CAPSULE, DELAYED RELEASE ORAL DAILY
Qty: 30 CAPSULE | Refills: 2 | Status: SHIPPED | OUTPATIENT
Start: 2021-07-14 | End: 2021-07-14

## 2021-07-14 NOTE — PATIENT INSTRUCTIONS
Diet for GERD  WHAT YOU NEED TO KNOW:   A diet for stomach ulcers and gastritis is a meal plan that limits foods that irritate your stomach  Certain foods may worsen symptoms such as stomach pain, bloating, heartburn, or indigestion  DISCHARGE INSTRUCTIONS:   Foods to limit or avoid:  You may need to avoid acidic, spicy, or high-fat foods  Not all foods affect everyone the same way  You will need to learn which foods worsen your symptoms and limit those foods  The following are some foods that may worsen ulcer or gastritis symptoms:  · Beverages:      ? Whole milk and chocolate milk    ? Hot cocoa and cola    ? Any beverage with caffeine    ? Regular and decaffeinated coffee    ? Peppermint and spearmint tea    ? Green and black tea, with or without caffeine    ? Orange and grapefruit juices    ? Drinks that contain alcohol    · Spices and seasonings:      ? Black and red pepper    ? Chili powder    ? Mustard seed and nutmeg    · Other foods:      ? Dairy foods made from whole milk or cream    ? Chocolate    ? Spicy or strongly flavored cheeses, such as jalapeno or black pepper    ? Highly seasoned, high-fat meats, such as sausage, salami, cuevas, ham, and cold cuts    ? Hot chiles and peppers    ? Tomato products, such as tomato paste, tomato sauce, or tomato juice    Foods to include:  Eat a variety of healthy foods from all the food groups  Eat fruits, vegetables, whole grains, and fat-free or low-fat dairy foods  Whole grains include whole-wheat breads, cereals, pasta, and brown rice  Choose lean meats, poultry (chicken and turkey), fish, beans, eggs, and nuts  A healthy meal plan is low in unhealthy fats, salt, and added sugar  Healthy fats include olive oil and canola oil  Ask your dietitian for more information about a healthy diet  Other helpful guidelines:   · Do not eat right before bedtime  Stop eating at least 2 hours before bedtime  · Eat small, frequent meals    Your stomach may tolerate small, frequent meals better than large meals  © Copyright [address] Information is for End User's use only and may not be sold, redistributed or otherwise used for commercial purposes  All illustrations and images included in CareNotes® are the copyrighted property of A D A M , Inc  or Darnell Carlson   The above information is an  only  It is not intended as medical advice for individual conditions or treatments  Talk to your doctor, nurse or pharmacist before following any medical regimen to see if it is safe and effective for pt  Pt is scheduled for an egd with dr Stuart Gilmore at [facility] on 9/13/21  jw went over instructions with pt and she has instructions  Patient is aware she will need a  to and from   She will get a call the day before with an exact time for arrival   Call if any sooner appts open

## 2021-07-20 ENCOUNTER — TELEPHONE (OUTPATIENT)
Dept: FAMILY MEDICINE CLINIC | Facility: CLINIC | Age: 56
End: 2021-07-20

## 2021-07-20 NOTE — TELEPHONE ENCOUNTER
I was running quality /HM paperwork for patient in our practice and Jimmy Yazmin came on on Dr Avelino Alberto list for being overdue for a PE    I called pt and she said she never saw Dr Avelino Alberto as a provider tc/cma

## 2021-07-26 ENCOUNTER — OFFICE VISIT (OUTPATIENT)
Dept: SPEECH THERAPY | Facility: CLINIC | Age: 56
End: 2021-07-26
Payer: COMMERCIAL

## 2021-07-26 ENCOUNTER — TELEPHONE (OUTPATIENT)
Dept: INTERNAL MEDICINE CLINIC | Facility: CLINIC | Age: 56
End: 2021-07-26

## 2021-07-26 DIAGNOSIS — J38.3 PARADOXICAL VOCAL CORD MOTION: Primary | ICD-10-CM

## 2021-07-26 PROCEDURE — 92507 TX SP LANG VOICE COMM INDIV: CPT | Performed by: NURSE PRACTITIONER

## 2021-07-26 NOTE — PROGRESS NOTES
Speech-Language Pathology Treatment Note    Today's date: 2021  Patient name: Kennedy Sandoval  : 1965  MRN: [id number]  Referring provider: Manuela Adam DO  Dx:   Encounter Diagnosis     ICD-10-CM    1  Paradoxical vocal cord motion  J38 3      Medical History significant for:   Past Medical History:   Diagnosis Date    [facility] [address] [facility]     Disease of thyroid gland     Thyroid cancer ([facility] [address] )      Flowsheet:  Start Time: 1545  Stop Time: 1630  Total time in clinic (min): 45 minutes    Subjective:  Patient arrived on time to her appointment today, attending i'ly  Hasn't been seen in office since 21 due to patient work schedule  She reports having to cancel yesterday 2' fatigue and her schedule being "off "  Patient has appt 21 with GI Dr Jose Guadlaupe Peters  EGD scheduled for September  HEP so far is successful with completing for her voice program  Today is a great vocal day, experiencing a sore throat and runny nose  She has been able to track vocal changes with work ( ? Increased talking, trying to compensate talk through her mask, working with chemicals)  Last day she worked was Friday, she has been off the weekend  Increased water intake ~80oz now  Started on reflux medication but not taking consistently reporting she doesn't feel reflux symptoms  Objective:  1  Patient will complete MFR evaluation to determine MTD role in vocal quality    MFR stretches 6/15 30 min       Facial stretches        suprahyoid Completed ( tension but able to tolerate) Completed ( tension >on right)      anterior cervical region ( holds, glides, key pinch  elongation/tranverse) Completed  Completed, ( tension >right)       infrahyoid Complete ( difficulty tolerating)        Lateral sides of neck Completed R/L ( voice better HT to left- >tension on left)  Completed ( tension >left)       Hyoid region  Completed (tension >R)       Submandibular ( holds and key pinch  elongation/transverse)  Completed tension bilaterally  suboccipital  Completed        Lingual          2  Patient will be educated on vocal hygiene and ways to improve VF health through strategies  6/15:  Education and hand out provided, patient verbalizing her understanding and agreement to increase these recommended strategies  6/29:  Patient reports decreased speaking in work environments which has helped, and she doesn't speak often at home  3  Patient will be educated on vocal abuse behaviors and ways to improve VF safety in speaking and non speaking environments  6/15:  Education and hand out provided, patient verbalizing her understanding and agreement to increase these recommended strategies  4  Patient will report increased water intake to ensure hydration  6/15:  Education and hand out provided, patient verbalizing her understanding and agreement to increase these recommended strategies  6/24:  She reports increased water intake ~60 oz throughout the day  6/29:  Water exceeding 65+ oz     5  Patient will be educated on diaphragmatic breathing to ensure proper breath support  6/15:  Education and hand out provided, patient verbalizing her understanding and agreement to increase these recommended strategies  6/29:  Patient reporting appropriate breathing technique 4-5x per day  6  Patient will complete formal swallowing evaluation to determine safest PO intake  6/29:  Goal met DYSPHAGIA EVALUATION:    7  Patient will be educated on GERD diet modifications/medication through GI care  6/15:  Education and hand out provided, patient verbalizing her understanding and agreement to increase these recommended strategies  6/24:  Patient reports that she was diagnosed with a "hiatal hernia" in her teens, she smoked a single cigarette and immediately had distress/pain breathing  She reports decreasing spicy foods  She has stopped taking Pepcid feeling like it didn't help her symptoms  She has never seen a GI specialist to her knowledge but does believe she had a barium swallow study in [id number] at [facility]  She cont to describe symptoms similar to esophageal spasm  She went to the ED as recently as 2018 for this same symptom  6/29:  GI appt set for 7/14     8  Patient will work to ensure optimal ventilation both at home and in work settings to decrease irritant exposure  6/24:  Patient reports increased exposure at this time 2' number of traumas called but she does wear the N95 more often  She has not noticed a difference in her breathing with this change  9  Patient will complete hearing screener to determine need for audiology consultation  10  Patient will be educated on PVFMD rescue techniques to improve restorative breath  11  Patient will undergo a sleep study to determine sleep quality and overall health per symptoms  Long Term Goals:  1  Patient will improve overall vocal quality  2  Patient will utilize provided strategies and education to increase safety of swallow  Assessment:  Patient recommended having a VBS completed to determine sensation on swallow  Order received but VBS not yet scheduled  Patient greatly benefitted from [facility] today, reporting posterior cervical tension which was greatly improved ROM by end of session  Patient encouraged to continue ROM neck exercises until next session  May benefit from physical therapy as discussed if posterior neck tightness and discomfort continues       Plan:  Recommendations:   Patients would benefit from: Voice therapy and Dysphagia therapy   Frequency:1-2x weekly   Duration:4-5 weeks    Intervention certification from:  2/4/5777  Intervention certification to:  8/4/8222  Intervention Comments: Vocal hygiene education, compensatory strategies for swallowing    Visit # 5    Homework: neck ROM stretches

## 2021-07-28 ENCOUNTER — OFFICE VISIT (OUTPATIENT)
Dept: SPEECH THERAPY | Facility: CLINIC | Age: 56
End: 2021-07-28
Payer: COMMERCIAL

## 2021-07-28 DIAGNOSIS — J38.3 PARADOXICAL VOCAL CORD MOTION: Primary | ICD-10-CM

## 2021-07-28 PROCEDURE — 92507 TX SP LANG VOICE COMM INDIV: CPT | Performed by: NURSE PRACTITIONER

## 2021-07-28 NOTE — PROGRESS NOTES
Speech-Language Pathology Treatment Note    Today's date: 2021  Patient name: Yony Rao  : 1965  MRN: [id number]  Referring provider: Eloisa Adams DO  Dx:   Encounter Diagnosis     ICD-10-CM    1  Paradoxical vocal cord motion  J38 3      Medical History significant for:   Past Medical History:   Diagnosis Date    [facility] disorder [facility] [address] [facility]     Disease of thyroid gland     Thyroid cancer ([facility] [address] )      Flowsheet:  Start Time: 1273  Stop Time: 1630  Total time in clinic (min): 60 minutes    Subjective:  Patient arrived on time to her appointment today, attending i'ly  Pt reports benefitting from last session, especially noticeable with neck and shoulder  Pt reports feeling sick "sore throat, congestion"  Patient informed that VBS script in system, patient will be scheduling once she is feeling better  Objective:  1  Patient will complete MFR evaluation to determine MTD role in vocal quality  MFR stretches 6/15 30 min      Facial stretches        suprahyoid Completed ( tension but able to tolerate) Completed ( tension >on right)      anterior cervical region ( holds, glides, key pinch  elongation/tranverse) Completed  Completed, ( tension >right)  Completed, discomfort near lower cervical rehion Tension near upper cervical region, helpful  infrahyoid Complete ( difficulty tolerating)        Lateral sides of neck Completed R/L ( voice better HT to left- >tension on left)  Completed ( tension >left)  Completed (>tension on L)     Hyoid region  Completed (tension >R)  Completed ( tension bilaterally but improved from last session)     Submandibular ( holds and key pinch  elongation/transverse)  Completed tension bilaterally  Increase tension posteriorly  benefitted     suboccipital  Completed  completed      Lingual          2  Patient will be educated on vocal hygiene and ways to improve VF health through strategies    6/15:  Education and hand out provided, patient verbalizing her understanding and agreement to increase these recommended strategies  6/29:  Patient reports decreased speaking in work environments which has helped, and she doesn't speak often at home  3  Patient will be educated on vocal abuse behaviors and ways to improve VF safety in speaking and non speaking environments  6/15:  Education and hand out provided, patient verbalizing her understanding and agreement to increase these recommended strategies  4  Patient will report increased water intake to ensure hydration  6/15:  Education and hand out provided, patient verbalizing her understanding and agreement to increase these recommended strategies  6/24:  She reports increased water intake ~60 oz throughout the day  6/29:  Water exceeding 65+ oz     5  Patient will be educated on diaphragmatic breathing to ensure proper breath support  6/15:  Education and hand out provided, patient verbalizing her understanding and agreement to increase these recommended strategies  6/29:  Patient reporting appropriate breathing technique 4-5x per day  6  Patient will complete formal swallowing evaluation to determine safest PO intake  6/29:  Goal met DYSPHAGIA EVALUATION:    7  Patient will be educated on GERD diet modifications/medication through GI care  6/15:  Education and hand out provided, patient verbalizing her understanding and agreement to increase these recommended strategies  6/24:  Patient reports that she was diagnosed with a "hiatal hernia" in her teens, she smoked a single cigarette and immediately had distress/pain breathing  She reports decreasing spicy foods  She has stopped taking Pepcid feeling like it didn't help her symptoms  She has never seen a GI specialist to her knowledge but does believe she had a barium swallow study in [id number] at [facility]  She cont to describe symptoms similar to esophageal spasm    She went to the ED as recently as 2018 for this same symptom  6/29:  GI appt set for 7/14     8  Patient will work to ensure optimal ventilation both at home and in work settings to decrease irritant exposure  6/24:  Patient reports increased exposure at this time 2' number of traumas called but she does wear the N95 more often  She has not noticed a difference in her breathing with this change  9  Patient will complete hearing screener to determine need for audiology consultation  7/28 recommend audiology consult  Symptoms mostly in the left ear including ringing, clicking "occasionally"  Pt with constant feeling of "ears blocked" but ENT confirmed no wax     250hz 500hz 1000hz 2000hz 4000hz 8000hz    Right ear dB 20 20 25 20 30 40   Left ear  dB 30 20 25 30 30 55       10  Patient will be educated on PVFMD rescue techniques to improve restorative breath  11  Patient will undergo a sleep study to determine sleep quality and overall health per symptoms  Long Term Goals:  1  Patient will improve overall vocal quality  2  Patient will utilize provided strategies and education to increase safety of swallow  Assessment:Patient greatly benefitted from [facility] today, reporting posterior cervical tension which was greatly improved ROM by end of session  Patient encouraged to continue ROM neck exercises until next session  May benefit from physical therapy as discussed if posterior neck tightness and discomfort continues       Plan:  Recommendations:   Patients would benefit from: Voice therapy and Dysphagia therapy   Frequency:1-2x weekly   Duration:4-5 weeks    Intervention certification from:  8/3/6189  Intervention certification to:  7/8/0714  Intervention Comments: Vocal hygiene education, compensatory strategies for swallowing    Visit # 6    Homework: neck ROM stretches

## 2021-08-16 ENCOUNTER — TELEPHONE (OUTPATIENT)
Dept: SURGERY | Facility: HOSPITAL | Age: 56
End: 2021-08-16

## 2021-08-18 ENCOUNTER — DOCUMENTATION (OUTPATIENT)
Dept: SPEECH THERAPY | Facility: CLINIC | Age: 56
End: 2021-08-18

## 2021-08-18 NOTE — PROGRESS NOTES
Speech and Language Therapy Discharge Report    Patient Name: Ronnie August   AUCCB'J Date: 08/18/21         Most Recent Assessment:  Evaluation completed 6/8/21  Patient last seen for therapy on 7/28/21  Short Term Goals at the Time of Discharge:  1  Patient will complete MFR evaluation to determine MTD role in vocal quality  GOAL PARTIALLY MET  MFR stretches 6/15 30 min 7/26 7/28     Facial stretches        suprahyoid Completed ( tension but able to tolerate) Completed ( tension >on right)      anterior cervical region ( holds, glides, key pinch  elongation/tranverse) Completed  Completed, ( tension >right)  Completed, discomfort near lower cervical rehion Tension near upper cervical region, helpful  infrahyoid Complete ( difficulty tolerating)        Lateral sides of neck Completed R/L ( voice better HT to left- >tension on left)  Completed ( tension >left)  Completed (>tension on L)     Hyoid region  Completed (tension >R)  Completed ( tension bilaterally but improved from last session)     Submandibular ( holds and key pinch  elongation/transverse)  Completed tension bilaterally  Increase tension posteriorly  benefitted     suboccipital  Completed  completed      Lingual          2  Patient will be educated on vocal hygiene and ways to improve VF health through strategies  GOAL PARTIALLY MET 6/15:  Education and hand out provided, patient verbalizing her understanding and agreement to increase these recommended strategies  6/29:  Patient reports decreased speaking in work environments which has helped, and she doesn't speak often at home  3  Patient will be educated on vocal abuse behaviors and ways to improve VF safety in speaking and non speaking environments  GOAL PARTIALLY MET 6/15:  Education and hand out provided, patient verbalizing her understanding and agreement to increase these recommended strategies  4  Patient will report increased water intake to ensure hydration  GOAL MET 6/15:  Education and hand out provided, patient verbalizing her understanding and agreement to increase these recommended strategies  6/24:  She reports increased water intake ~60 oz throughout the day  6/29:  Water exceeding 65+ oz     5  Patient will be educated on diaphragmatic breathing to ensure proper breath support  GOAL MET 6/15:  Education and hand out provided, patient verbalizing her understanding and agreement to increase these recommended strategies  6/29:  Patient reporting appropriate breathing technique 4-5x per day  6  Patient will complete formal swallowing evaluation to determine safest PO intake  6/29:  Goal met DYSPHAGIA EVALUATION    7  Patient will be educated on GERD diet modifications/medication through GI care  GOAL MET 6/15:  Education and hand out provided, patient verbalizing her understanding and agreement to increase these recommended strategies  6/24:  Patient reports that she was diagnosed with a "hiatal hernia" in her teens, she smoked a single cigarette and immediately had distress/pain breathing  She reports decreasing spicy foods  She has stopped taking Pepcid feeling like it didn't help her symptoms  She has never seen a GI specialist to her knowledge but does believe she had a barium swallow study in [id number] at [facility]  She cont to describe symptoms similar to esophageal spasm  She went to the ED as recently as 2018 for this same symptom  6/29:  GI appt set for 7/14     8  Patient will work to ensure optimal ventilation both at home and in work settings to decrease irritant exposure  GOAL PARTIALLY MET 6/24:  Patient reports increased exposure at this time 2' number of traumas called but she does wear the N95 more often  She has not noticed a difference in her breathing with this change  9  Patient will complete hearing screener to determine need for audiology consultation  GOAL MET 7/28 recommend audiology consult   Symptoms mostly in the left ear including ringing, clicking "occasionally"  Pt with constant feeling of "ears blocked" but ENT confirmed no wax     250hz 500hz 1000hz 2000hz 4000hz 8000hz    Right ear dB 20 20 25 20 30 40   Left ear  dB 30 20 25 30 30 55       10  Patient will be educated on PVFMD rescue techniques to improve restorative breath  GOAL NOT MET    11  Patient will undergo a sleep study to determine sleep quality and overall health per symptoms  GOAL NOT MET     Long Term Goals:  1  Patient will improve overall vocal quality  GOAL PARTIALLY MET  2  Patient will utilize provided strategies and education to increase safety of swallow  GOAL PARTIALLY MET     Assessment:Patient greatly benefitted from [facility] today, reporting posterior cervical tension which was greatly improved ROM by end of session  Patient encouraged to continue ROM neck exercises until next session  May benefit from physical therapy as discussed if posterior neck tightness and discomfort continues  Discharge Information: Patient will be discharged from therapy at this time due to policy and procedure  Patient did not show or cancel her last three scheduled appointments 8/11, 8/16 and today 8/18  SLP did try to call patient to inform of discharge and cancelling future appointments; however, voicemail full and unable to leave a voicemail  Participation in Treatment (at discharge):   Cooperative    Patient is discharged to [address] [place] [address] name/phone number for follow up: [phone]

## 2021-09-05 ENCOUNTER — HOSPITAL ENCOUNTER (EMERGENCY)
Facility: HOSPITAL | Age: 56
Discharge: HOME | End: 2021-09-05
Attending: EMERGENCY MEDICINE | Admitting: EMERGENCY MEDICINE
Payer: COMMERCIAL

## 2021-09-05 VITALS
TEMPERATURE: 97.6 F | WEIGHT: 220 LBS | BODY MASS INDEX: 32.49 KG/M2 | OXYGEN SATURATION: 96 % | RESPIRATION RATE: 18 BRPM | SYSTOLIC BLOOD PRESSURE: 143 MMHG | DIASTOLIC BLOOD PRESSURE: 65 MMHG | HEART RATE: 61 BPM

## 2021-09-05 DIAGNOSIS — Z20.822 ENCOUNTER FOR LABORATORY TESTING FOR COVID-19 VIRUS: Primary | ICD-10-CM

## 2021-09-05 LAB — SARS-COV-2 RNA RESP QL NAA+PROBE: NEGATIVE

## 2021-09-05 PROCEDURE — U0003 INFECTIOUS AGENT DETECTION BY NUCLEIC ACID (DNA OR RNA); SEVERE ACUTE RESPIRATORY SYNDROME CORONAVIRUS 2 (SARS-COV-2) (CORONAVIRUS DISEASE [COVID-19]), AMPLIFIED PROBE TECHNIQUE, MAKING USE OF HIGH THROUGHPUT TECHNOLOGIES AS DESCRIBED BY CMS-2020-01-R: HCPCS | Performed by: PHYSICIAN ASSISTANT

## 2021-09-05 PROCEDURE — 99282 EMERGENCY DEPT VISIT SF MDM: CPT | Performed by: PHYSICIAN ASSISTANT

## 2021-09-05 PROCEDURE — U0005 INFEC AGEN DETEC AMPLI PROBE: HCPCS | Performed by: PHYSICIAN ASSISTANT

## 2021-09-05 PROCEDURE — 99282 EMERGENCY DEPT VISIT SF MDM: CPT

## 2021-09-05 NOTE — ED PROVIDER NOTES
History  Chief Complaint   Patient presents with    Labs Only     pt exposed to positive covid this week  now has decreased taste and smell     Patient presents to the emergency department today for routine evaluation COVID  She states she does work here as a , she noted some body aches and a bilateral frontal headache with loss of taste this morning  She has concern for COVID  She states she is fully vaccinated against the coronavirus  No other symptoms  She is well-appearing at bedside  Prior to Admission Medications   Prescriptions Last Dose Informant Patient Reported? Taking?    Synthroid 100 MCG tablet  Self No No   Sig: Take 1 tablet (100 mcg total) by mouth daily   benzonatate (TESSALON PERLES) 100 mg capsule  Self No No   Sig: Take 1 capsule (100 mg total) by mouth 3 (three) times a day as needed for cough   Patient not taking: Reported on 2021   calcium carbonate-vitamin D (OSCAL-D) 500 mg-200 units per tablet  Self No No   Sig: Take 1 tablet by mouth daily with breakfast   Patient not taking: Reported on 2021   fluticasone (FLONASE) 50 mcg/act nasal spray   No No   Si sprays into each nostril daily   ibuprofen (MOTRIN) 800 mg tablet  Self No No   Sig: Take 1 tablet (800 mg total) by mouth every 8 (eight) hours as needed for mild pain or moderate pain for up to 5 days   lamoTRIgine (LaMICtal) 150 MG tablet  Self Yes No   Sig: Take 150 mg by mouth daily   omeprazole (PriLOSEC) 40 MG capsule   No No   Sig: Take 1 capsule (40 mg total) by mouth daily   ranitidine (ZANTAC) 150 MG capsule  Self No No   Sig: Take 1 capsule (150 mg total) by mouth 2 (two) times a day   Patient not taking: Reported on 2021      Facility-Administered Medications: None       Past Medical History:   Diagnosis Date    Bipolar 1 disorder ([facility] [address] )     Disease of thyroid gland     Thyroid cancer ([facility] [address] )        Past Surgical History:   Procedure Laterality Date    BLADDER NECK RECONSTRUCTION  BLADDER SUSPENSION      HYSTERECTOMY      ROTATOR CUFF REPAIR Bilateral     done twice on each side    SHOULDER SURGERY      THYROIDECTOMY, PARTIAL         Family History   Problem Relation Age of Onset    No Known Problems Mother     No Known Problems Father     No Known Problems Sister     Throat cancer Maternal Grandfather     No Known Problems Sister     No Known Problems Sister     No Known Problems Paternal Aunt     Breast cancer Maternal Aunt 61    Brain cancer Maternal Aunt      I have reviewed and agree with the history as documented  E-Cigarette/Vaping    E-Cigarette Use Never User      E-Cigarette/Vaping Substances    Nicotine No     THC No      Social History     Tobacco Use    Smoking status: Former Smoker     Packs/day: 1 00     Types: Cigarettes    Smokeless tobacco: Never Used   Vaping Use    Vaping Use: Never used   Substance Use Topics    Alcohol use: No    Drug use: No       Review of Systems   Constitutional: Negative  Negative for chills and fever  HENT: Negative  Negative for sore throat and trouble swallowing  Loss of taste   Eyes: Negative  Respiratory: Negative  Negative for cough, shortness of breath and wheezing  Cardiovascular: Negative  Negative for chest pain and leg swelling  Gastrointestinal: Negative  Negative for abdominal pain, blood in stool and vomiting  Endocrine: Negative  Genitourinary: Negative  Musculoskeletal: Negative  Negative for neck stiffness  Skin: Negative  Allergic/Immunologic: Negative  Neurological: Positive for headaches  Negative for dizziness, seizures, speech difficulty, weakness, light-headedness and numbness  Hematological: Negative  Psychiatric/Behavioral: Negative  All other systems reviewed and are negative  Physical Exam  Physical Exam  Vitals reviewed  Constitutional:       General: She is not in acute distress  Appearance: She is normal weight   She is not ill-appearing, toxic-appearing or diaphoretic  HENT:      Head: Normocephalic and atraumatic  Nose: Congestion present  No rhinorrhea  Mouth/Throat:      Mouth: Mucous membranes are moist       Pharynx: No oropharyngeal exudate  Eyes:      General: No scleral icterus  Right eye: No discharge  Left eye: No discharge  Extraocular Movements: Extraocular movements intact  Conjunctiva/sclera: Conjunctivae normal    Cardiovascular:      Rate and Rhythm: Normal rate  Pulses: Normal pulses  Pulmonary:      Effort: Pulmonary effort is normal  No respiratory distress  Breath sounds: Normal breath sounds  No stridor  Musculoskeletal:         General: No deformity or signs of injury  Normal range of motion  Skin:     General: Skin is warm  Capillary Refill: Capillary refill takes less than 2 seconds  Coloration: Skin is not jaundiced  Neurological:      General: No focal deficit present  Mental Status: She is alert and oriented to person, place, and time  Gait: Gait normal    Psychiatric:         Mood and Affect: Mood normal          Behavior: Behavior normal          Thought Content:  Thought content normal          Vital Signs  ED Triage Vitals [09/05/21 1844]   Temperature Pulse Respirations Blood Pressure SpO2   97 6 °F (36 4 °C) 61 18 143/65 96 %      Temp Source Heart Rate Source Patient Position - Orthostatic VS BP Location FiO2 (%)   Temporal Monitor Sitting Right arm --      Pain Score       --           Vitals:    09/05/21 1844   BP: 143/65   Pulse: 61   Patient Position - Orthostatic VS: Sitting         Visual Acuity      ED Medications  Medications - No data to display    Diagnostic Studies  Results Reviewed     Procedure Component Value Units Date/Time    Novel Coronavirus (Covid-19),PCR [facility] - 24 Hour Routine [[id number]] Collected: 09/05/21 1850    Lab Status: No result Specimen: Nares from Nasopharyngeal Swab                  No orders to display              Procedures  Procedures         ED Course                                           MDM    Disposition  Final diagnoses:   Encounter for laboratory testing for COVID-19 virus     Time reflects when diagnosis was documented in both MDM as applicable and the Disposition within this note     Time User Action Codes Description Comment    9/5/2021  6:48 PM Kerstin Holstein Add [Z20 822] Encounter for laboratory testing for COVID-19 virus       ED Disposition     ED Disposition Condition Date/Time Comment    Discharge Stable Sun Sep 5, 2021  6:48 PM Shazia Palacios discharge to home/self care              Follow-up Information     Follow up With Specialties Details Why Tiera James 912, [phone] Erik Ambrocio, Nurse Practitioner Schedule an appointment as soon as possible for a visit  As needed 6 [facility]  [address]  [phone]            Discharge Medication List as of 9/5/2021  6:49 PM      CONTINUE these medications which have NOT CHANGED    Details   benzonatate (TESSALON PERLES) 100 mg capsule Take 1 capsule (100 mg total) by mouth 3 (three) times a day as needed for cough, Starting Mon 9/30/2019, Normal      calcium carbonate-vitamin D (OSCAL-D) 500 mg-200 units per tablet Take 1 tablet by mouth daily with breakfast, Starting Wed 5/27/2020, Normal      fluticasone (FLONASE) 50 mcg/act nasal spray 2 sprays into each nostril daily, Starting Wed 5/26/2021, Until Fri 6/25/2021, Normal      ibuprofen (MOTRIN) 800 mg tablet Take 1 tablet (800 mg total) by mouth every 8 (eight) hours as needed for mild pain or moderate pain for up to 5 days, Starting Mon 6/8/2020, Until Wed 5/26/2021, Normal      lamoTRIgine (LaMICtal) 150 MG tablet Take 150 mg by mouth daily, Historical Med      omeprazole (PriLOSEC) 40 MG capsule Take 1 capsule (40 mg total) by mouth daily, Starting Wed 7/14/2021, Normal      ranitidine (ZANTAC) 150 MG capsule Take 1 capsule (150 mg total) by mouth 2 (two) times a day, Starting Wed 5/27/2020, Normal      Synthroid 100 MCG tablet Take 1 tablet (100 mcg total) by mouth daily, Starting Tue 11/3/2020, Normal           No discharge procedures on file      PDMP Review     None          ED Provider  Electronically Signed by           Christophe Allen PA-C  09/05/21 0388

## 2021-09-05 NOTE — DISCHARGE INSTRUCTIONS
If you are sick:    + Stay home  If you feel sick, call your health provider and they will tell you what to do next  We are reserving COVID-19 tests for people who exhibit symptoms in order to conserve resources  + Download the [employer] and use the Video Visit feature to connect with a doctor      + Call the [address] [place] [address] [place] : [phone]  ([phone])  and choose option 7           or          email  them at [email or web address]

## 2021-11-01 DIAGNOSIS — E89.0 POSTOPERATIVE HYPOTHYROIDISM: ICD-10-CM

## 2021-11-02 RX ORDER — LEVOTHYROXINE SODIUM 100 MCG
TABLET ORAL
Qty: 90 TABLET | Refills: 0 | Status: SHIPPED | OUTPATIENT
Start: 2021-11-02 | End: 2022-02-01

## 2021-11-15 ENCOUNTER — IMMUNIZATIONS (OUTPATIENT)
Dept: INTERNAL MEDICINE CLINIC | Facility: CLINIC | Age: 56
End: 2021-11-15
Payer: COMMERCIAL

## 2021-11-15 DIAGNOSIS — Z23 ENCOUNTER FOR IMMUNIZATION: Primary | ICD-10-CM

## 2021-11-15 PROCEDURE — 90682 RIV4 VACC RECOMBINANT DNA IM: CPT

## 2021-11-15 PROCEDURE — 90471 IMMUNIZATION ADMIN: CPT

## 2021-12-08 ENCOUNTER — IMMUNIZATIONS (OUTPATIENT)
Dept: FAMILY MEDICINE CLINIC | Facility: HOSPITAL | Age: 56
End: 2021-12-08

## 2021-12-08 DIAGNOSIS — Z23 ENCOUNTER FOR IMMUNIZATION: Primary | ICD-10-CM

## 2021-12-08 PROCEDURE — 0064A COVID-19 MODERNA VACC 0.25 ML BOOSTER: CPT

## 2021-12-08 PROCEDURE — 91306 COVID-19 MODERNA VACC 0.25 ML BOOSTER: CPT

## 2022-01-31 DIAGNOSIS — E89.0 POSTOPERATIVE HYPOTHYROIDISM: ICD-10-CM

## 2022-02-01 RX ORDER — LEVOTHYROXINE SODIUM 100 MCG
TABLET ORAL
Qty: 90 TABLET | Refills: 0 | Status: SHIPPED | OUTPATIENT
Start: 2022-02-01 | End: 2022-05-02

## 2022-04-11 ENCOUNTER — TELEPHONE (OUTPATIENT)
Dept: INTERNAL MEDICINE CLINIC | Facility: CLINIC | Age: 57
End: 2022-04-11

## 2022-04-11 DIAGNOSIS — Z12.31 ENCOUNTER FOR SCREENING MAMMOGRAM FOR MALIGNANT NEOPLASM OF BREAST: Primary | ICD-10-CM

## 2022-04-26 ENCOUNTER — OFFICE VISIT (OUTPATIENT)
Dept: INTERNAL MEDICINE CLINIC | Facility: CLINIC | Age: 57
End: 2022-04-26
Payer: COMMERCIAL

## 2022-04-26 VITALS
BODY MASS INDEX: 32.64 KG/M2 | DIASTOLIC BLOOD PRESSURE: 78 MMHG | WEIGHT: 220.4 LBS | OXYGEN SATURATION: 94 % | SYSTOLIC BLOOD PRESSURE: 124 MMHG | HEART RATE: 93 BPM | TEMPERATURE: 98 F | HEIGHT: 69 IN

## 2022-04-26 DIAGNOSIS — E78.2 MIXED HYPERLIPIDEMIA: ICD-10-CM

## 2022-04-26 DIAGNOSIS — D47.3 ESSENTIAL THROMBOCYTOSIS (HCC): ICD-10-CM

## 2022-04-26 DIAGNOSIS — Z12.4 SCREENING FOR CERVICAL CANCER: ICD-10-CM

## 2022-04-26 DIAGNOSIS — E03.9 HYPOTHYROIDISM, UNSPECIFIED TYPE: ICD-10-CM

## 2022-04-26 DIAGNOSIS — Z00.00 ROUTINE ADULT HEALTH MAINTENANCE: Primary | ICD-10-CM

## 2022-04-26 DIAGNOSIS — Z13.1 SCREENING FOR DIABETES MELLITUS: ICD-10-CM

## 2022-04-26 DIAGNOSIS — E89.0 POSTOPERATIVE HYPOTHYROIDISM: ICD-10-CM

## 2022-04-26 DIAGNOSIS — E04.1 THYROID NODULE: ICD-10-CM

## 2022-04-26 PROCEDURE — 99396 PREV VISIT EST AGE 40-64: CPT | Performed by: NURSE PRACTITIONER

## 2022-04-26 NOTE — PATIENT INSTRUCTIONS
Low Fat Diet   AMBULATORY CARE:   A low-fat diet  is an eating plan that is low in total fat, unhealthy fat, and cholesterol  You may need to follow a low-fat diet if you have trouble digesting or absorbing fat  You may also need to follow this diet if you have high cholesterol  You can also lower your cholesterol by increasing the amount of fiber in your diet  Soluble fiber is a type of fiber that helps to decrease cholesterol levels  Different types of fat in food:   · Limit unhealthy fats  A diet that is high in cholesterol, saturated fat, and trans fat may cause unhealthy cholesterol levels  Unhealthy cholesterol levels increase your risk of heart disease  ? Cholesterol:  Limit intake of cholesterol to less than 200 mg per day  Cholesterol is found in meat, eggs, and dairy  ? Saturated fat:  Limit saturated fat to less than 7% of your total daily calories  Ask your dietitian how many calories you need each day  Saturated fat is found in butter, cheese, ice cream, whole milk, and palm oil  Saturated fat is also found in meat, such as beef, pork, chicken skin, and processed meats  Processed meats include sausage, hot dogs, and bologna  ? Trans fat:  Avoid trans fat as much as possible  Trans fat is used in fried and baked foods  Foods that say trans fat free on the label may still have up to 0 5 grams of trans fat per serving  · Include healthy fats  Replace foods that are high in saturated and trans fat with foods high in healthy fats  This may help to decrease high cholesterol levels  ? Monounsaturated fats: These are found in avocados, nuts, and vegetable oils, such as olive, canola, and sunflower oil  ? Polyunsaturated fats: These can be found in vegetable oils, such as soybean or corn oil  Omega-3 fats can help to decrease the risk of heart disease  Omega-3 fats are found in fish, such as salmon, herring, trout, and tuna   Omega-3 fats can also be found in plant foods, such as walnuts, flaxseed, soybeans, and canola oil  Foods to limit or avoid:   · Grains:      ? Snacks that are made with partially hydrogenated oils, such as chips, regular crackers, and butter-flavored popcorn    ? High-fat baked goods, such as biscuits, croissants, doughnuts, pies, cookies, and pastries    · Dairy:      ? Whole milk, 2% milk, and yogurt and ice cream made with whole milk    ? Half and half creamer, heavy cream, and whipping cream    ? Cheese, cream cheese, and sour cream    · Meats and proteins:      ? High-fat cuts of meat (T-bone steak, regular hamburger, and ribs)    ? Fried meat, poultry (turkey and chicken), and fish    ? Poultry (chicken and turkey) with skin    ? Cold cuts (salami or bologna), hot dogs, cuevas, and sausage    ? Whole eggs and egg yolks    · Vegetables and fruits with added fat:      ? Fried vegetables or vegetables in butter or high-fat sauces, such as cream or cheese sauces    ? Fried fruit or fruit served with butter or cream    · Fats:      ? Butter, stick margarine, and shortening    ? Coconut, palm oil, and palm kernel oil    Foods to include:   · Grains:      ? Whole-grain breads, cereals, pasta, and brown rice    ? Low-fat crackers and pretzels    · Vegetables and fruits:      ? Fresh, frozen, or canned vegetables (no salt or low-sodium)    ? Fresh, frozen, dried, or canned fruit (canned in light syrup or fruit juice)    ? Avocado    · Low-fat dairy products:      ? Nonfat (skim) or 1% milk    ? Nonfat or low-fat cheese, yogurt, and cottage cheese    · Meats and proteins:      ? Chicken or turkey with no skin    ? Baked or broiled fish    ? Lean beef and pork (loin, round, extra lean hamburger)    ? Beans and peas, unsalted nuts, soy products    ? Egg whites and substitutes    ? Seeds and nuts    · Fats:      ? Unsaturated oil, such as canola, olive, peanut, soybean, or sunflower oil    ? Soft or liquid margarine and vegetable oil spread    ?  Low-fat salad dressing    Other ways to decrease fat:   · Read food labels before you buy foods  Choose foods that have less than 30% of calories from fat  Choose low-fat or fat-free dairy products  Remember that fat free does not mean calorie free  These foods still contain calories, and too many calories can lead to weight gain  · Trim fat from meat and avoid fried food  Trim all visible fat from meat before you cook it  Remove the skin from poultry  Do not colindres meat, fish, or poultry  Bake, roast, boil, or broil these foods instead  Avoid fried foods  Eat a baked potato instead of Western Sis fries  Steam vegetables instead of sautéing them in butter  · Add less fat to foods  Use imitation cuevas bits on salads and baked potatoes instead of regular cuevas bits  Use fat-free or low-fat salad dressings instead of regular dressings  Use low-fat or nonfat butter-flavored topping instead of regular butter or margarine on popcorn and other foods  Ways to decrease fat in recipes:  Replace high-fat ingredients with low-fat or nonfat ones  This may cause baked goods to be drier than usual  You may need to use nonfat cooking spray on pans to prevent food from sticking  You also may need to change the amount of other ingredients, such as water, in the recipe  Try the following:  · Use low-fat or light margarine instead of regular margarine or shortening  · Use lean ground turkey breast or chicken, or lean ground beef (less than 5% fat) instead of hamburger  · Add 1 teaspoon of canola oil to 8 ounces of skim milk instead of using cream or half and half  · Use grated zucchini, carrots, or apples in breads instead of coconut  · Use blenderized, low-fat cottage cheese, plain tofu, or low-fat ricotta cheese instead of cream cheese  · Use 1 egg white and 1 teaspoon of canola oil, or use ¼ cup (2 ounces) of fat-free egg substitute instead of a whole egg       · Replace half of the oil that is called for in a recipe with applesauce when you bake  Use 3 tablespoons of cocoa powder and 1 tablespoon of canola oil instead of a square of baking chocolate  How to increase fiber:  Eat enough high-fiber foods to get 20 to 30 grams of fiber every day  Slowly increase your fiber intake to avoid stomach cramps, gas, and other problems  · Eat 3 ounces of whole-grain foods each day  An ounce is about 1 slice of bread  Eat whole-grain breads, such as whole-wheat bread  Whole wheat, whole-wheat flour, or other whole grains should be listed as the first ingredient on the food label  Replace white flour with whole-grain flour or use half of each in recipes  Whole-grain flour is heavier than white flour, so you may have to add more yeast or baking powder  · Eat a high-fiber cereal for breakfast   Oatmeal is a good source of soluble fiber  Look for cereals that have bran or fiber in the name  Choose whole-grain products, such as brown rice, barley, and whole-wheat pasta  · Eat more beans, peas, and lentils  For example, add beans to soups or salads  Eat at least 5 cups of fruits and vegetables each day  Eat fruits and vegetables with the peel because the peel is high in fiber  © Copyright [employer] 2022 Information is for End User's use only and may not be sold, redistributed or otherwise used for commercial purposes  All illustrations and images included in CareNotes® are the copyrighted property of A D A M , Inc  or [address] [place], [place] [address]svetlana   The above information is an  only  It is not intended as medical advice for individual conditions or treatments  Talk to your doctor, nurse or pharmacist before following any medical regimen to see if it is safe and effective for you  Heart Healthy Diet   AMBULATORY CARE:   A heart healthy diet  is an eating plan low in unhealthy fats and sodium (salt)  The plan is high in healthy fats and fiber   A heart healthy diet helps improve your cholesterol levels and lowers your risk for heart disease and stroke  A dietitian will teach you how to read and understand food labels  Heart healthy diet guidelines to follow:   · Choose foods that contain healthy fats  ? Unsaturated fats  include monounsaturated and polyunsaturated fats  Unsaturated fat is found in foods such as soybean, canola, olive, corn, and safflower oils  It is also found in soft tub margarine that is made with liquid vegetable oil  ? Omega-3 fat  is found in certain fish, such as salmon, tuna, and trout, and in walnuts and flaxseed  Eat fish high in omega-3 fats at least 2 times a week  · Get 20 to 30 grams of fiber each day  Fruits, vegetables, whole-grain foods, and legumes (cooked beans) are good sources of fiber  · Limit or do not have unhealthy fats  ? Cholesterol  is found in animal foods, such as eggs and lobster, and in dairy products made from whole milk  Limit cholesterol to less than 200 mg each day  ? Saturated fat  is found in meats, such as cuevas and hamburger  It is also found in chicken or turkey skin, whole milk, and butter  Limit saturated fat to less than 7% of your total daily calories  ? Trans fat  is found in packaged foods, such as potato chips and cookies  It is also in hard margarine, some fried foods, and shortening  Do not eat foods that contain trans fats  · Limit sodium as directed  You may be told to limit sodium to 2,000 to 2,300 mg each day  Choose low-sodium or no-salt-added foods  Add little or no salt to food you prepare  Use herbs and spices in place of salt  Include the following in your heart healthy plan:  Ask your dietitian or healthcare provider how many servings to have from each of the following food groups:  · Grains:      ? Whole-wheat breads, cereals, and pastas, and brown rice    ? Low-fat, low-sodium crackers and chips    · Vegetables:      ? Broccoli, green beans, green peas, and spinach    ? Collards, kale, and lima beans    ?  Carrots, sweet potatoes, tomatoes, and peppers    ? Canned vegetables with no salt added    · Fruits:      ? Bananas, peaches, pears, and pineapple    ? Grapes, raisins, and dates    ? Oranges, tangerines, grapefruit, orange juice, and grapefruit juice    ? Apricots, mangoes, melons, and papaya    ? Raspberries and strawberries    ? Canned fruit with no added sugar    · Low-fat dairy:      ? Nonfat (skim) milk, 1% milk, and low-fat almond, cashew, or soy milks fortified with calcium    ? Low-fat cheese, regular or frozen yogurt, and cottage cheese    · Meats and proteins:      ? Lean cuts of beef and pork (loin, leg, round), skinless chicken and turkey    ? Legumes, soy products, egg whites, or nuts    Limit or do not include the following in your heart healthy plan:   · Unhealthy fats and oils:      ? Whole or 2% milk, cream cheese, sour cream, or cheese    ? High-fat cuts of beef (T-bone steaks, ribs), chicken or turkey with skin, and organ meats such as liver    ? Butter, stick margarine, shortening, and cooking oils such as coconut or palm oil    · Foods and liquids high in sodium:      ? Packaged foods, such as frozen dinners, cookies, macaroni and cheese, and cereals with more than 300 mg of sodium per serving    ? Vegetables with added sodium, such as instant potatoes, vegetables with added sauces, or regular canned vegetables    ? Cured or smoked meats, such as hot dogs, cuevas, and sausage    ? High-sodium ketchup, barbecue sauce, salad dressing, pickles, olives, soy sauce, or miso    · Foods and liquids high in sugar:      ? Candy, cake, cookies, pies, or doughnuts    ? Soft drinks (soda), sports drinks, or sweetened tea    ? Canned or dry mixes for cakes, soups, sauces, or gravies    Other healthy heart guidelines:   · Do not smoke  Nicotine and other chemicals in cigarettes and cigars can cause lung and heart damage  Ask your healthcare provider for information if you currently smoke and need help to quit  E-cigarettes or smokeless tobacco still contain nicotine  Talk to your healthcare provider before you use these products  · Limit or do not drink alcohol as directed  Alcohol can damage your heart and raise your blood pressure  Your healthcare provider may give you specific daily and weekly limits  The general recommended limit is 1 drink a day for women 21 or older and for men 72 or older  Do not have more than 3 drinks in a day or 7 in a week  The recommended limit is 2 drinks a day for men 24to 59years of age  Do not have more than 4 drinks in a day or 14 in a week  A drink of alcohol is 12 ounces of beer, 5 ounces of wine, or 1½ ounces of liquor  · Exercise regularly  Exercise can help you maintain a healthy weight and improve your blood pressure and cholesterol levels  Regular exercise can also decrease your risk for heart problems  Ask your healthcare provider about the best exercise plan for you  Do not start an exercise program without asking your healthcare provider  Follow up with your doctor or cardiologist as directed:  Write down your questions so you remember to ask them during your visits  © Copyright [employer] 2022 Information is for End User's use only and may not be sold, redistributed or otherwise used for commercial purposes  All illustrations and images included in CareNotes® are the copyrighted property of A D A M , Inc  or [facility] Carlson   The above information is an  only  It is not intended as medical advice for individual conditions or treatments  Talk to your doctor, nurse or pharmacist before following any medical regimen to see if it is safe and effective for you

## 2022-04-26 NOTE — PROGRESS NOTES
Assessment/Plan: Will repeat fasting labs  Will repeat thyroid US  Set up for mammogram and will make an appointment for GYN  Did have covid and flu vaccines  Will follow up in one year or sooner if need be  No problem-specific Assessment & Plan notes found for this encounter  Problem List Items Addressed This Visit        Endocrine    Postoperative hypothyroidism    Relevant Orders    Comprehensive metabolic panel    Comprehensive metabolic panel    TSH, 3rd generation with Free T4 reflex    Thyroid nodule    Relevant Orders    US thyroid    Hypothyroidism    Relevant Orders    Comprehensive metabolic panel    Comprehensive metabolic panel    TSH, 3rd generation with Free T4 reflex    Comprehensive metabolic panel    Comprehensive metabolic panel    TSH, 3rd generation with Free T4 reflex    US thyroid       Hematopoietic and Hemostatic    Essential thrombocytosis (HCC)    Relevant Orders    Comprehensive metabolic panel    Comprehensive metabolic panel    TSH, 3rd generation with Free T4 reflex       Other    Mixed hyperlipidemia    Relevant Orders    Comprehensive metabolic panel    Comprehensive metabolic panel    TSH, 3rd generation with Free T4 reflex    Lipid panel    Screening for cervical cancer    Relevant Orders    Ambulatory Referral to Obstetrics / Gynecology    Routine adult health maintenance - Primary    Relevant Orders    Comprehensive metabolic panel    Comprehensive metabolic panel    TSH, 3rd generation with Free T4 reflex    BMI 32 0-32 9,adult    Relevant Orders    Comprehensive metabolic panel    Comprehensive metabolic panel    TSH, 3rd generation with Free T4 reflex    Screening for diabetes mellitus    Relevant Orders    HEMOGLOBIN A1C W/ EAG ESTIMATION            Subjective:      Patient ID: Glen Smith is a 61 y o  female  Antoine Washington is for a routine wellness  She is doing well and has no major complaints  She is working night shift and does like this   She is having at times a weird odor to her urine and in her nose and is taking Biotin daily  She is not sure if this is causing it  She denies any chestp ain, SOB, or palpitations  She denies any depression or anxiety  She is due to see GYN and does have a mammogram set up in May  She will be due for labs as well  She offers no other issues  The following portions of the patient's history were reviewed and updated as appropriate: She  has a past medical history of Bipolar 1 disorder ([facility] ), Disease of thyroid gland, and Thyroid cancer ([facility] ) (1987)  She   Patient Active Problem List    Diagnosis Date Noted    Routine adult health maintenance 04/26/2022    BMI 32 0-32 9,adult 04/26/2022    Screening for diabetes mellitus 04/26/2022    Chronic fatigue 02/24/2021    Myalgia 02/24/2021    Impaired glucose tolerance 02/24/2021    Benign neoplasm of pituitary gland and craniopharyngeal duct (pouch) ([facility] ) 03/25/2020    Dysphagia 03/25/2020    Dysphonia 03/25/2020    Essential thrombocytosis ([facility] ) 03/25/2020    Neoplasm of bone, soft tissue, and skin 03/25/2020    Osteoarthritis 03/25/2020    Nicotine dependence, unspecified, uncomplicated 34/33/7925    Other abnormal glucose 03/25/2020    Pure hypercholesterolemia, unspecified 03/25/2020    Solitary pulmonary nodule 03/25/2020    BMI 31 0-31 9,adult 08/19/2019    Screening for cervical cancer 08/19/2019    Screening for colon cancer 08/19/2019    Former smoker 08/19/2019    Mixed hyperlipidemia 02/13/2019    Other constipation 02/13/2019    Postmenopausal 02/13/2019    [facility] 02/13/2019    Postoperative hypothyroidism 03/09/2018    Bipolar disorder, unspecified ([facility] ) 03/09/2018    Stiffness of multiple joints 03/09/2018    Thyroid nodule 07/29/2015    Hypothyroidism 02/02/2010    Allergic rhinitis 04/21/2009     She  has a past surgical history that includes Shoulder surgery; Bladder neck reconstruction;  Thyroidectomy, partial; Hysterectomy; Bladder suspension; and Rotator cuff repair (Bilateral)  Her family history includes Brain cancer in her maternal aunt; Breast cancer (age of onset: 61) in her maternal aunt; No Known Problems in her father, mother, paternal aunt, sister, sister, and sister; Throat cancer in her maternal grandfather  She  reports that she has quit smoking  Her smoking use included cigarettes  She smoked 1 00 pack per day  She has never used smokeless tobacco  She reports that she does not drink alcohol and does not use drugs  Current Outpatient Medications   Medication Sig Dispense Refill    ibuprofen (MOTRIN) 800 mg tablet Take 1 tablet (800 mg total) by mouth every 8 (eight) hours as needed for mild pain or moderate pain for up to 5 days 20 tablet 0    lamoTRIgine (LaMICtal) 150 MG tablet Take 150 mg by mouth daily      Synthroid 100 MCG tablet TAKE ONE TABLET BY MOUTH DAILY 90 tablet 0    calcium carbonate-vitamin D (OSCAL-D) 500 mg-200 units per tablet Take 1 tablet by mouth daily with breakfast (Patient not taking: Reported on 5/26/2021) 90 tablet 4    omeprazole (PriLOSEC) 40 MG capsule Take 1 capsule (40 mg total) by mouth daily (Patient not taking: Reported on 4/26/2022 ) 30 capsule 2     No current facility-administered medications for this visit       Current Outpatient Medications on File Prior to Visit   Medication Sig    ibuprofen (MOTRIN) 800 mg tablet Take 1 tablet (800 mg total) by mouth every 8 (eight) hours as needed for mild pain or moderate pain for up to 5 days    lamoTRIgine (LaMICtal) 150 MG tablet Take 150 mg by mouth daily    Synthroid 100 MCG tablet TAKE ONE TABLET BY MOUTH DAILY    calcium carbonate-vitamin D (OSCAL-D) 500 mg-200 units per tablet Take 1 tablet by mouth daily with breakfast (Patient not taking: Reported on 5/26/2021)    omeprazole (PriLOSEC) 40 MG capsule Take 1 capsule (40 mg total) by mouth daily (Patient not taking: Reported on 4/26/2022 )    [DISCONTINUED] benzonatate (TESSALON PERLES) 100 mg capsule Take 1 capsule (100 mg total) by mouth 3 (three) times a day as needed for cough (Patient not taking: Reported on 5/26/2021)    [DISCONTINUED] fluticasone (FLONASE) 50 mcg/act nasal spray 2 sprays into each nostril daily    [DISCONTINUED] ranitidine (ZANTAC) 150 MG capsule Take 1 capsule (150 mg total) by mouth 2 (two) times a day (Patient not taking: Reported on 5/26/2021)     No current facility-administered medications on file prior to visit  She is allergic to demerol [meperidine] and latex       Review of Systems   All other systems reviewed and are negative  Objective:      /78 (BP Location: Left arm, Patient Position: Sitting, Cuff Size: Large)   Pulse 93   Temp 98 °F (36 7 °C) (Temporal)   Ht 5' 9" (1 753 m)   Wt 100 kg (220 lb 6 4 oz)   SpO2 94%   BMI 32 55 kg/m²          Physical Exam  Vitals reviewed  Constitutional:       Appearance: Normal appearance  She is normal weight  HENT:      Head: Normocephalic and atraumatic  Right Ear: Tympanic membrane, ear canal and external ear normal       Left Ear: Tympanic membrane, ear canal and external ear normal       Nose: Nose normal       Mouth/Throat:      Mouth: Mucous membranes are moist       Pharynx: Oropharynx is clear  Eyes:      Extraocular Movements: Extraocular movements intact  Conjunctiva/sclera: Conjunctivae normal       Pupils: Pupils are equal, round, and reactive to light  Cardiovascular:      Rate and Rhythm: Normal rate and regular rhythm  Pulses: Normal pulses  Heart sounds: Normal heart sounds  Pulmonary:      Effort: Pulmonary effort is normal       Breath sounds: Normal breath sounds  Abdominal:      General: Abdomen is flat  Bowel sounds are normal       Palpations: Abdomen is soft  Musculoskeletal:         General: Normal range of motion  Cervical back: Normal range of motion and neck supple  Skin:     General: Skin is warm and dry  Capillary Refill: Capillary refill takes less than 2 seconds  Neurological:      General: No focal deficit present  Mental Status: She is alert and oriented to person, place, and time  Mental status is at baseline  Psychiatric:         Mood and Affect: Mood normal          Behavior: Behavior normal          Thought Content: Thought content normal          Judgment: Judgment normal          BMI Counseling: Body mass index is 32 55 kg/m²  The BMI is above normal  Nutrition recommendations include reducing portion sizes, decreasing overall calorie intake, 3-5 servings of fruits/vegetables daily, reducing fast food intake, consuming healthier snacks, decreasing soda and/or juice intake, moderation in carbohydrate intake, increasing intake of lean protein, reducing intake of saturated fat and trans fat and reducing intake of cholesterol

## 2022-05-17 ENCOUNTER — APPOINTMENT (OUTPATIENT)
Dept: LAB | Facility: HOSPITAL | Age: 57
End: 2022-05-17
Payer: COMMERCIAL

## 2022-05-17 DIAGNOSIS — E89.0 POSTOPERATIVE HYPOTHYROIDISM: ICD-10-CM

## 2022-05-17 DIAGNOSIS — E03.9 HYPOTHYROIDISM, UNSPECIFIED TYPE: ICD-10-CM

## 2022-05-17 DIAGNOSIS — Z00.00 ROUTINE ADULT HEALTH MAINTENANCE: ICD-10-CM

## 2022-05-17 DIAGNOSIS — E78.2 MIXED HYPERLIPIDEMIA: ICD-10-CM

## 2022-05-17 DIAGNOSIS — Z13.1 SCREENING FOR DIABETES MELLITUS: ICD-10-CM

## 2022-05-17 DIAGNOSIS — D47.3 ESSENTIAL THROMBOCYTOSIS (HCC): ICD-10-CM

## 2022-05-17 LAB
ALBUMIN SERPL BCP-MCNC: 3.8 G/DL (ref 3.5–5)
ALP SERPL-CCNC: 73 U/L (ref 46–116)
ALT SERPL W P-5'-P-CCNC: 25 U/L (ref 12–78)
ANION GAP SERPL CALCULATED.3IONS-SCNC: 7 MMOL/L (ref 4–13)
AST SERPL W P-5'-P-CCNC: 13 U/L (ref 5–45)
BILIRUB SERPL-MCNC: 0.19 MG/DL (ref 0.2–1)
BUN SERPL-MCNC: 12 MG/DL (ref 5–25)
CALCIUM SERPL-MCNC: 8.9 MG/DL (ref 8.3–10.1)
CHLORIDE SERPL-SCNC: 106 MMOL/L (ref 100–108)
CHOLEST SERPL-MCNC: 176 MG/DL
CO2 SERPL-SCNC: 24 MMOL/L (ref 21–32)
CREAT SERPL-MCNC: 0.86 MG/DL (ref 0.6–1.3)
EST. AVERAGE GLUCOSE BLD GHB EST-MCNC: 108 MG/DL
GFR SERPL CREATININE-BSD FRML MDRD: 74 ML/MIN/1.73SQ M
GLUCOSE SERPL-MCNC: 88 MG/DL (ref 65–140)
HBA1C MFR BLD: 5.4 %
HDLC SERPL-MCNC: 49 MG/DL
LDLC SERPL CALC-MCNC: 90 MG/DL (ref 0–100)
NONHDLC SERPL-MCNC: 127 MG/DL
POTASSIUM SERPL-SCNC: 4 MMOL/L (ref 3.5–5.3)
PROT SERPL-MCNC: 7 G/DL (ref 6.4–8.2)
SODIUM SERPL-SCNC: 137 MMOL/L (ref 136–145)
TRIGL SERPL-MCNC: 186 MG/DL
TSH SERPL DL<=0.05 MIU/L-ACNC: 2.41 UIU/ML (ref 0.45–4.5)

## 2022-05-17 PROCEDURE — 36415 COLL VENOUS BLD VENIPUNCTURE: CPT

## 2022-05-17 PROCEDURE — 83036 HEMOGLOBIN GLYCOSYLATED A1C: CPT

## 2022-05-17 PROCEDURE — 84443 ASSAY THYROID STIM HORMONE: CPT

## 2022-05-17 PROCEDURE — 80053 COMPREHEN METABOLIC PANEL: CPT

## 2022-05-17 PROCEDURE — 80061 LIPID PANEL: CPT

## 2022-05-19 ENCOUNTER — TELEPHONE (OUTPATIENT)
Dept: INTERNAL MEDICINE CLINIC | Facility: CLINIC | Age: 57
End: 2022-05-19

## 2022-05-19 ENCOUNTER — OFFICE VISIT (OUTPATIENT)
Dept: INTERNAL MEDICINE CLINIC | Facility: CLINIC | Age: 57
End: 2022-05-19
Payer: COMMERCIAL

## 2022-05-19 VITALS — HEART RATE: 77 BPM | OXYGEN SATURATION: 95 % | TEMPERATURE: 98.1 F

## 2022-05-19 DIAGNOSIS — Z20.822 EXPOSURE TO COVID-19 VIRUS: Primary | ICD-10-CM

## 2022-05-19 PROCEDURE — 99214 OFFICE O/P EST MOD 30 MIN: CPT | Performed by: NURSE PRACTITIONER

## 2022-05-19 NOTE — TELEPHONE ENCOUNTER
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

## 2022-05-19 NOTE — PROGRESS NOTES
Assessment/Plan: Rapid covid negative  Did advise to wear mask, gloves, and goggles at work if any worsening of symptoms did advise to call the office  No problem-specific Assessment & Plan notes found for this encounter  Problem List Items Addressed This Visit        Other    Exposure to COVID-19 virus - Primary            Subjective:      Patient ID: Delma Sheridan is a 61 y o  female  Junior Kalempers is for an acute visit  She was exposed to other employees who have covid  She is starting with sore throat, cough, and congestion  She denies any fever  The following portions of the patient's history were reviewed and updated as appropriate: She  has a past medical history of Bipolar 1 disorder ([facility] ), Disease of thyroid gland, and Thyroid cancer ([facility] ) (1987)    She   Patient Active Problem List    Diagnosis Date Noted    Exposure to COVID-19 virus 05/19/2022    Routine adult health maintenance 04/26/2022    BMI 32 0-32 9,adult 04/26/2022    Screening for diabetes mellitus 04/26/2022    Chronic fatigue 02/24/2021    Myalgia 02/24/2021    Impaired glucose tolerance 02/24/2021    Benign neoplasm of pituitary gland and craniopharyngeal duct (pouch) ([facility] ) 03/25/2020    Dysphagia 03/25/2020    Dysphonia 03/25/2020    Essential thrombocytosis ([facility] ) 03/25/2020    Neoplasm of bone, soft tissue, and skin 03/25/2020    Osteoarthritis 03/25/2020    Nicotine dependence, unspecified, uncomplicated 44/92/0803    Other abnormal glucose 03/25/2020    Pure hypercholesterolemia, unspecified 03/25/2020    Solitary pulmonary nodule 03/25/2020    BMI 31 0-31 9,adult 08/19/2019    Screening for cervical cancer 08/19/2019    Screening for colon cancer 08/19/2019    Former smoker 08/19/2019    Mixed hyperlipidemia 02/13/2019    Other constipation 02/13/2019    Postmenopausal 02/13/2019    Callous ulcer ([facility] ) 02/13/2019    Postoperative hypothyroidism 03/09/2018    [facility], [facility] 03/09/2018    Stiffness of multiple joints 03/09/2018    Thyroid nodule 07/29/2015    Hypothyroidism 02/02/2010    Allergic rhinitis 04/21/2009     She  has a past surgical history that includes Shoulder surgery; Bladder neck reconstruction; Thyroidectomy, partial; Hysterectomy; Bladder suspension; and Rotator cuff repair (Bilateral)  Her family history includes Brain cancer in her maternal aunt; Breast cancer (age of onset: 61) in her maternal aunt; No Known Problems in her father, mother, paternal aunt, sister, sister, and sister; Throat cancer in her maternal grandfather  She  reports that she has quit smoking  Her smoking use included cigarettes  She smoked 1 00 pack per day  She has never used smokeless tobacco  She reports that she does not drink alcohol and does not use drugs  Current Outpatient Medications   Medication Sig Dispense Refill    calcium carbonate-vitamin D (OSCAL-D) 500 mg-200 units per tablet Take 1 tablet by mouth daily with breakfast (Patient not taking: Reported on 5/26/2021) 90 tablet 4    ibuprofen (MOTRIN) 800 mg tablet Take 1 tablet (800 mg total) by mouth every 8 (eight) hours as needed for mild pain or moderate pain for up to 5 days 20 tablet 0    lamoTRIgine (LaMICtal) 150 MG tablet Take 150 mg by mouth daily      omeprazole (PriLOSEC) 40 MG capsule Take 1 capsule (40 mg total) by mouth daily (Patient not taking: Reported on 4/26/2022 ) 30 capsule 2    Synthroid 100 MCG tablet Take 1 tablet (100 mcg total) by mouth daily 30 tablet 0     No current facility-administered medications for this visit       Current Outpatient Medications on File Prior to Visit   Medication Sig    calcium carbonate-vitamin D (OSCAL-D) 500 mg-200 units per tablet Take 1 tablet by mouth daily with breakfast (Patient not taking: Reported on 5/26/2021)    ibuprofen (MOTRIN) 800 mg tablet Take 1 tablet (800 mg total) by mouth every 8 (eight) hours as needed for mild pain or moderate pain for up to 5 days    lamoTRIgine (LaMICtal) 150 MG tablet Take 150 mg by mouth daily    omeprazole (PriLOSEC) 40 MG capsule Take 1 capsule (40 mg total) by mouth daily (Patient not taking: Reported on 4/26/2022 )    Synthroid 100 MCG tablet Take 1 tablet (100 mcg total) by mouth daily     No current facility-administered medications on file prior to visit  She is allergic to demerol [meperidine] and latex       Review of Systems   HENT: Positive for congestion and postnasal drip  Respiratory: Positive for cough  All other systems reviewed and are negative  Objective:      Pulse 77   Temp 98 1 °F (36 7 °C)   SpO2 95%          Physical Exam  Constitutional:       Appearance: Normal appearance  She is normal weight  Neurological:      Mental Status: She is alert  Psychiatric:         Mood and Affect: Mood normal          Behavior: Behavior normal          Thought Content:  Thought content normal          Judgment: Judgment normal

## 2022-05-26 ENCOUNTER — HOSPITAL ENCOUNTER (OUTPATIENT)
Dept: MAMMOGRAPHY | Facility: HOSPITAL | Age: 57
Discharge: HOME | End: 2022-05-26
Payer: COMMERCIAL

## 2022-05-26 ENCOUNTER — HOSPITAL ENCOUNTER (OUTPATIENT)
Dept: ULTRASOUND IMAGING | Facility: HOSPITAL | Age: 57
Discharge: HOME | End: 2022-05-26
Payer: COMMERCIAL

## 2022-05-26 VITALS — BODY MASS INDEX: 32.65 KG/M2 | HEIGHT: 69 IN | WEIGHT: 220.46 LBS

## 2022-05-26 DIAGNOSIS — R92.1 MAMMOGRAPHIC CALCIFICATION: ICD-10-CM

## 2022-05-26 DIAGNOSIS — R92.8 FOLLOW-UP EXAMINATION OF ABNORMAL MAMMOGRAM: ICD-10-CM

## 2022-05-26 PROCEDURE — 77066 DX MAMMO INCL CAD BI: CPT

## 2022-05-26 PROCEDURE — 76642 ULTRASOUND BREAST LIMITED: CPT

## 2022-05-26 PROCEDURE — G0279 TOMOSYNTHESIS, MAMMO: HCPCS

## 2022-05-26 NOTE — PROGRESS NOTES
Call placed to patient regarding recommendation for;    ___X__ RIGHT ______LEFT      _____Ultrasound guided  ____X__Stereotactic breast biopsy  Pt states that procedure was explained to her, additional questions answered at this time    __X___Verbalized understanding        Blood thinners: No: _X____ Yes: _____ What:     Biopsy teaching sheet given:  _____yes __X__no (All teaching points discussed during call, pt with no questions at this time, pt adv to arrive at 1200 for 1230 biopsy)    Pt given name/# for any further questions/needs    Pt agreeable to a post procedure call and states we can give her biopsy results to her over the phone

## 2022-06-02 ENCOUNTER — HOSPITAL ENCOUNTER (OUTPATIENT)
Dept: MAMMOGRAPHY | Facility: CLINIC | Age: 57
Discharge: HOME | End: 2022-06-02
Payer: COMMERCIAL

## 2022-06-02 VITALS — HEART RATE: 55 BPM | SYSTOLIC BLOOD PRESSURE: 119 MMHG | DIASTOLIC BLOOD PRESSURE: 60 MMHG

## 2022-06-02 DIAGNOSIS — R92.8 ABNORMAL MAMMOGRAM: ICD-10-CM

## 2022-06-02 PROCEDURE — 19081 BX BREAST 1ST LESION STRTCTC: CPT

## 2022-06-02 PROCEDURE — 88305 TISSUE EXAM BY PATHOLOGIST: CPT | Performed by: PATHOLOGY

## 2022-06-02 PROCEDURE — A4648 IMPLANTABLE TISSUE MARKER: HCPCS

## 2022-06-02 RX ORDER — LIDOCAINE HYDROCHLORIDE 10 MG/ML
5 INJECTION, SOLUTION EPIDURAL; INFILTRATION; INTRACAUDAL; PERINEURAL ONCE
Status: COMPLETED | OUTPATIENT
Start: 2022-06-02 | End: 2022-06-02

## 2022-06-02 RX ORDER — BUPIVACAINE HYDROCHLORIDE AND EPINEPHRINE 2.5; 5 MG/ML; UG/ML
10 INJECTION, SOLUTION EPIDURAL; INFILTRATION; INTRACAUDAL; PERINEURAL ONCE
Status: COMPLETED | OUTPATIENT
Start: 2022-06-02 | End: 2022-06-02

## 2022-06-02 RX ADMIN — LIDOCAINE HYDROCHLORIDE 5 ML: 10 INJECTION, SOLUTION EPIDURAL; INFILTRATION; INTRACAUDAL; PERINEURAL at 12:55

## 2022-06-02 RX ADMIN — BUPIVACAINE HYDROCHLORIDE AND EPINEPHRINE BITARTRATE 10 ML: 2.5; .005 INJECTION, SOLUTION EPIDURAL; INFILTRATION; INTRACAUDAL; PERINEURAL at 12:55

## 2022-06-02 NOTE — PROGRESS NOTES
Procedure type:    _____ultrasound guided ___x__stereotactic    Breast:    _____Left __x___Right    Location: Retro    Needle: 8g Revolve    # of passes: 4 cores (1 cores with calcs, 3 cores without calcs)    Clip: N/A-pls see Dr Candy Givens radiology report    Performed by: Dr Gus Hubbard held for 5 minutes by: Lennette Goltz Steri Strips:    _x____yes _____no    Band aid:    ___x__yes_____no    Tape and guaze:    _____yes __x___no    Tolerated procedure:    __x___yes _____no

## 2022-06-02 NOTE — DISCHARGE INSTR - OTHER ORDERS
POST LARGE CORE BREAST BIOPSY PATIENT INFORMATION      Place an ice pack inside your bra over the top of the dressing every hour for 20 minutes (20 minutes on, 60 minutes off)  Do this until bedtime  Do not shower or bathe until the following morning  After bathing, you may remove the bandaid  You may bathe your breast carefully with the steri-strips in place  Be careful not to loosen them  The steri-strips will fall off in 3-5 days  You may have mild discomfort, and you may have some bruising where the needle entered the skin  This should clear within 5-7 days  If you need medicine for discomfort, take acetaminophen products such as Tylenol  You may also take Advil or Motrin products  DO NOT use Aspirin for the first 24 hours  Do not participate in strenuous activities such as-tennis, aerobics, weight lifting, etc  for 24 hours  Refrain from swimming/soaking for 72 hours  Wearing a bra for sleeping may be more comfortable for the first 24-48 hours after your biopsy  Watch for continued bleeding, pain or fever over 101  If any of these symptoms occur, please contact our breast nurse navigator at the location where your biopsy was performed  During normal business hours (7:30am - 4:00pm) please call the nurse navigator at the site     where your procedure was performed:       [facility] [address]: [phone] or      [facility] Floor: [phone] or [phone]     [address]: [address] [place]: [address]: [phone]        After 4pm - please call your physician or go to the nearest Emergency Department location  The final results of your biopsy are usually available within one week

## 2022-06-03 NOTE — PROGRESS NOTES
Post procedure call completed    Bleeding: _____yes _x____no    Pain: ___x__yes ______no- "Aleve and ice are helping"    Redness/Swelling: ______yes ___x___no    Band aid removed: _____yes __x___no    Steri-Strips intact: ___x___yes _____no

## 2022-06-06 ENCOUNTER — HOSPITAL ENCOUNTER (OUTPATIENT)
Dept: ULTRASOUND IMAGING | Facility: HOSPITAL | Age: 57
Discharge: HOME | End: 2022-06-06
Payer: COMMERCIAL

## 2022-06-06 ENCOUNTER — TELEPHONE (OUTPATIENT)
Dept: MAMMOGRAPHY | Facility: CLINIC | Age: 57
End: 2022-06-06

## 2022-06-06 DIAGNOSIS — E03.9 HYPOTHYROIDISM, UNSPECIFIED TYPE: ICD-10-CM

## 2022-06-06 DIAGNOSIS — E04.1 THYROID NODULE: ICD-10-CM

## 2022-06-06 PROCEDURE — 76536 US EXAM OF HEAD AND NECK: CPT

## 2022-07-18 ENCOUNTER — TELEPHONE (OUTPATIENT)
Dept: INTERNAL MEDICINE CLINIC | Facility: CLINIC | Age: 57
End: 2022-07-18

## 2022-07-18 NOTE — TELEPHONE ENCOUNTER
Jose Roberto Ramirez called today that she tested positive for covid  Symptoms started yesterday  Chest congestion, bad headache, no taste  States she has a fever but has no thermometer to take her temperature  Patient is willing to try Paxlovid  She needs to find out if it has any zinc in it, and is going to call [facility] to see if they have it available  She will call us back

## 2022-10-11 PROBLEM — Z00.00 ROUTINE ADULT HEALTH MAINTENANCE: Status: RESOLVED | Noted: 2022-04-26 | Resolved: 2022-10-11

## 2022-10-11 PROBLEM — Z13.1 SCREENING FOR DIABETES MELLITUS: Status: RESOLVED | Noted: 2022-04-26 | Resolved: 2022-10-11

## 2022-10-20 DIAGNOSIS — E89.0 POSTOPERATIVE HYPOTHYROIDISM: ICD-10-CM

## 2022-10-20 RX ORDER — LEVOTHYROXINE SODIUM 100 MCG
TABLET ORAL
Qty: 90 TABLET | Refills: 0 | Status: SHIPPED | OUTPATIENT
Start: 2022-10-20

## 2023-03-17 ENCOUNTER — APPOINTMENT (OUTPATIENT)
Dept: LAB | Facility: HOSPITAL | Age: 58
End: 2023-03-17

## 2023-03-17 DIAGNOSIS — Z00.8 HEALTH EXAMINATION IN POPULATION SURVEY: ICD-10-CM

## 2023-03-17 LAB
CHOLEST SERPL-MCNC: 171 MG/DL
EST. AVERAGE GLUCOSE BLD GHB EST-MCNC: 114 MG/DL
HBA1C MFR BLD: 5.6 %
HDLC SERPL-MCNC: 54 MG/DL
LDLC SERPL CALC-MCNC: 93 MG/DL (ref 0–100)
NONHDLC SERPL-MCNC: 117 MG/DL
TRIGL SERPL-MCNC: 119 MG/DL

## 2023-05-01 ENCOUNTER — OFFICE VISIT (OUTPATIENT)
Dept: INTERNAL MEDICINE CLINIC | Facility: CLINIC | Age: 58
End: 2023-05-01

## 2023-05-01 VITALS
DIASTOLIC BLOOD PRESSURE: 68 MMHG | TEMPERATURE: 98.3 F | WEIGHT: 216 LBS | OXYGEN SATURATION: 98 % | SYSTOLIC BLOOD PRESSURE: 128 MMHG | HEART RATE: 66 BPM | BODY MASS INDEX: 31.99 KG/M2 | HEIGHT: 69 IN

## 2023-05-01 DIAGNOSIS — Z12.31 ENCOUNTER FOR SCREENING MAMMOGRAM FOR MALIGNANT NEOPLASM OF BREAST: ICD-10-CM

## 2023-05-01 DIAGNOSIS — E89.0 POSTOPERATIVE HYPOTHYROIDISM: ICD-10-CM

## 2023-05-01 DIAGNOSIS — R20.2 NUMBNESS AND TINGLING IN BOTH HANDS: ICD-10-CM

## 2023-05-01 DIAGNOSIS — R20.0 NUMBNESS AND TINGLING IN BOTH HANDS: ICD-10-CM

## 2023-05-01 DIAGNOSIS — E03.9 HYPOTHYROIDISM, UNSPECIFIED TYPE: ICD-10-CM

## 2023-05-01 DIAGNOSIS — Z00.00 ROUTINE ADULT HEALTH MAINTENANCE: Primary | ICD-10-CM

## 2023-05-01 DIAGNOSIS — E78.00 PURE HYPERCHOLESTEROLEMIA, UNSPECIFIED: ICD-10-CM

## 2023-05-01 DIAGNOSIS — D47.3 ESSENTIAL THROMBOCYTOSIS (HCC): ICD-10-CM

## 2023-05-01 DIAGNOSIS — F31.9 BIPOLAR AFFECTIVE DISORDER, REMISSION STATUS UNSPECIFIED (HCC): ICD-10-CM

## 2023-05-01 DIAGNOSIS — L84 CORN OF FOOT: ICD-10-CM

## 2023-05-01 DIAGNOSIS — E78.2 MIXED HYPERLIPIDEMIA: ICD-10-CM

## 2023-05-01 DIAGNOSIS — Z12.11 SCREENING FOR COLON CANCER: ICD-10-CM

## 2023-05-01 RX ORDER — LEVOTHYROXINE SODIUM 100 MCG
100 TABLET ORAL DAILY
Qty: 90 TABLET | Refills: 3 | Status: SHIPPED | OUTPATIENT
Start: 2023-05-01

## 2023-05-01 NOTE — PROGRESS NOTES
Name: Gokul Dominguez      : 1965      MRN: [id number]  Encounter Provider: MARIANN Metz  Encounter Date: 2023   Encounter department: [address] [place] [address] W  Will repeat labs  Will give script for mammogram and cologuard  Will send for EMGS of upper extremities  Will refer to Podiatry  Continues to see Psychiatry stable on Lamictal  Will follow up in one year or sooner if need be  1  Routine adult health maintenance  -     Comprehensive metabolic panel; Future  -     CBC and differential; Future    2  Hypothyroidism, unspecified type  -     Comprehensive metabolic panel; Future  -     CBC and differential; Future  -     TSH, 3rd generation with Free T4 reflex; Future    3  Essential thrombocytosis (HCC)  -     Comprehensive metabolic panel; Future  -     CBC and differential; Future    4  Bipolar affective disorder, [facility] status [facility]  -     Comprehensive metabolic panel; Future  -     CBC and differential; Future    5  Mixed hyperlipidemia  -     Comprehensive metabolic panel; Future  -     CBC and differential; Future    6  Pure hypercholesterolemia, unspecified  -     Comprehensive metabolic panel; Future  -     CBC and differential; Future    7  Numbness and tingling in both hands  -     EMG 2 Limb Upper Extremity; Future  -     Comprehensive metabolic panel; Future  -     CBC and differential; Future    8  BMI 32 0-32 9,adult  -     Comprehensive metabolic panel; Future  -     CBC and differential; Future    9  Encounter for screening mammogram for malignant neoplasm of breast  -     Mammo screening bilateral w 3d & cad; Future; Expected date: 2023  -     Comprehensive metabolic panel; Future  -     CBC and differential; Future    10  Screening for colon cancer  -     Cologuard  -     Comprehensive metabolic panel; Future  -     CBC and differential; Future    11   Postoperative hypothyroidism  -     Synthroid 100 MCG tablet; Take 1 tablet (100 mcg total) by mouth daily    12  [place] of foot  -     Ambulatory Referral to Podiatry; Future      BMI Counseling: Body mass index is 32 19 kg/m²  The BMI is above normal  Nutrition recommendations include decreasing portion sizes, moderation in carbohydrate intake and reducing intake of saturated and trans fat  Exercise recommendations include exercising 3-5 times per week  No pharmacotherapy was ordered  Rationale for BMI follow-up plan is due to patient being overweight or obese  Subjective      Jennyfer Said is for a wellness  She is doing well but having numbness and tingling in her BL hands  She states she is not sure if this is related to her shoulders or something more is going on  She states at night her hands throb  She also is having foot pain on the right  She has a sore in between her 4th and 5th toes  She is using cushioning but this is not helping  She is due for a mammogram and would like to do a cologuard  She did her employee labs  She denies any chest pain, SOB, or palpitations  She is continuing on Lamictal  She offers no other issues  Review of Systems   Musculoskeletal: Positive for arthralgias and myalgias  All other systems reviewed and are negative        Current Outpatient Medications on File Prior to Visit   Medication Sig    lamoTRIgine (LaMICtal) 150 MG tablet Take 150 mg by mouth daily    [DISCONTINUED] Synthroid 100 MCG tablet TAKE ONE TABLET BY MOUTH DAILY    ibuprofen (MOTRIN) 800 mg tablet Take 1 tablet (800 mg total) by mouth every 8 (eight) hours as needed for mild pain or moderate pain for up to 5 days    [DISCONTINUED] calcium carbonate-vitamin D (OSCAL-D) 500 mg-200 units per tablet Take 1 tablet by mouth daily with breakfast (Patient not taking: Reported on 5/26/2021)    [DISCONTINUED] omeprazole (PriLOSEC) 40 MG capsule Take 1 capsule (40 mg total) by mouth daily (Patient not taking: Reported on 4/26/2022)       Objective     /68 "Pulse 66   Temp 98 3 °F (36 8 °C) (Temporal)   Ht 5' 9\" (1 753 m)   Wt 98 9 kg (218 lb)   SpO2 98%   BMI 32 19 kg/m²     Physical Exam  Vitals reviewed  Constitutional:       Appearance: Normal appearance  She is normal weight  HENT:      Head: Normocephalic and atraumatic  Right Ear: Tympanic membrane, ear canal and external ear normal       Left Ear: Tympanic membrane, ear canal and external ear normal       Nose: Nose normal       Mouth/Throat:      Mouth: Mucous membranes are moist       Pharynx: Oropharynx is clear  Eyes:      Extraocular Movements: Extraocular movements intact  Conjunctiva/sclera: Conjunctivae normal       Pupils: Pupils are equal, round, and reactive to light  Cardiovascular:      Rate and Rhythm: Normal rate and regular rhythm  Pulses: Normal pulses  Heart sounds: Normal heart sounds  Pulmonary:      Effort: Pulmonary effort is normal       Breath sounds: Normal breath sounds  Abdominal:      General: Abdomen is flat  Bowel sounds are normal       Palpations: Abdomen is soft  Musculoskeletal:         General: Normal range of motion  Cervical back: Normal range of motion and neck supple  Skin:     General: Skin is warm and dry  Capillary Refill: Capillary refill takes less than 2 seconds  Comments: Corn noted in between 4th and 5 toes on right foot   Neurological:      General: No focal deficit present  Mental Status: She is alert and oriented to person, place, and time  Mental status is at baseline  Psychiatric:         Mood and Affect: Mood normal          Behavior: Behavior normal          Thought Content: Thought content normal          Judgment: Judgment normal        MARIANN Marcus  BMI Counseling: Body mass index is 32 19 kg/m²   The BMI is above normal  Nutrition recommendations include reducing portion sizes, decreasing overall calorie intake, 3-5 servings of fruits/vegetables daily, reducing fast food intake, " consuming healthier snacks, decreasing soda and/or juice intake, moderation in carbohydrate intake, increasing intake of lean protein, reducing intake of saturated fat and trans fat and reducing intake of cholesterol

## 2023-05-01 NOTE — PATIENT INSTRUCTIONS
Low Fat Diet   AMBULATORY CARE:   A low-fat diet  is an eating plan that is low in total fat, unhealthy fat, and cholesterol  You may need to follow a low-fat diet if you have trouble digesting or absorbing fat  You may also need to follow this diet if you have high cholesterol  You can also lower your cholesterol by increasing the amount of fiber in your diet  Soluble fiber is a type of fiber that helps to decrease cholesterol levels  Different types of fat in food:    Limit unhealthy fats  A diet that is high in cholesterol, saturated fat, and trans fat may cause unhealthy cholesterol levels  Unhealthy cholesterol levels increase your risk of heart disease  ? Cholesterol:  Limit intake of cholesterol to less than 200 mg per day  Cholesterol is found in meat, eggs, and dairy  ? Saturated fat:  Limit saturated fat to less than 7% of your total daily calories  Ask your dietitian how many calories you need each day  Saturated fat is found in butter, cheese, ice cream, whole milk, and palm oil  Saturated fat is also found in meat, such as beef, pork, chicken skin, and processed meats  Processed meats include sausage, hot dogs, and bologna  ? Trans fat:  Avoid trans fat as much as possible  Trans fat is used in fried and baked foods  Foods that say trans fat free on the label may still have up to 0 5 grams of trans fat per serving   Include healthy fats  Replace foods that are high in saturated and trans fat with foods high in healthy fats  This may help to decrease high cholesterol levels  ? Monounsaturated fats: These are found in avocados, nuts, and vegetable oils, such as olive, canola, and sunflower oil  ? Polyunsaturated fats: These can be found in vegetable oils, such as soybean or corn oil  Omega-3 fats can help to decrease the risk of heart disease  Omega-3 fats are found in fish, such as salmon, herring, trout, and tuna   Omega-3 fats can also be found in plant foods, such as walnuts, flaxseed, soybeans, and canola oil  Foods to limit or avoid:    Grains:      ? Snacks that are made with partially hydrogenated oils, such as chips, regular crackers, and butter-flavored popcorn    ? High-fat baked goods, such as biscuits, croissants, doughnuts, pies, cookies, and pastries     Dairy:      ? Whole milk, 2% milk, and yogurt and ice cream made with whole milk    ? Half and half creamer, heavy cream, and whipping cream    ? Cheese, cream cheese, and sour cream     Meats and proteins:      ? High-fat cuts of meat (T-bone steak, regular hamburger, and ribs)    ? Fried meat, poultry (turkey and chicken), and fish    ? Poultry (chicken and turkey) with skin    ? Cold cuts (salami or bologna), hot dogs, cuevas, and sausage    ? Whole eggs and egg yolks     Vegetables and fruits with added fat:      ? Fried vegetables or vegetables in butter or high-fat sauces, such as cream or cheese sauces    ? Fried fruit or fruit served with butter or cream     Fats:      ? Butter, stick margarine, and shortening    ? Coconut, palm oil, and palm kernel oil    Foods to include:        Grains:      ? Whole-grain breads, cereals, pasta, and brown rice    ? Low-fat crackers and pretzels     Vegetables and fruits:      ? Fresh, frozen, or canned vegetables (no salt or low-sodium)    ? Fresh, frozen, dried, or canned fruit (canned in light syrup or fruit juice)    ? Avocado     Low-fat dairy products:      ? Nonfat (skim) or 1% milk    ? Nonfat or low-fat cheese, yogurt, and cottage cheese     Meats and proteins:      ? Chicken or turkey with no skin    ? Baked or broiled fish    ? Lean beef and pork (loin, round, extra lean hamburger)    ? Beans and peas, unsalted nuts, soy products    ? Egg whites and substitutes    ? Seeds and nuts     Fats:      ? Unsaturated oil, such as canola, olive, peanut, soybean, or sunflower oil    ? Soft or liquid margarine and vegetable oil spread    ?  Low-fat salad dressing  Other ways to decrease fat:    Read food labels before you buy foods  Choose foods that have less than 30% of calories from fat  Choose low-fat or fat-free dairy products  Remember that fat free does not mean calorie free  These foods still contain calories, and too many calories can lead to weight gain   Trim fat from meat and avoid fried food  Trim all visible fat from meat before you cook it  Remove the skin from poultry  Do not colindres meat, fish, or poultry  Bake, roast, boil, or broil these foods instead  Avoid fried foods  Eat a baked potato instead of Western Sis fries  Steam vegetables instead of sautéing them in butter   Add less fat to foods  Use imitation cuevas bits on salads and baked potatoes instead of regular cuevas bits  Use fat-free or low-fat salad dressings instead of regular dressings  Use low-fat or nonfat butter-flavored topping instead of regular butter or margarine on popcorn and other foods  Ways to decrease fat in recipes:  Replace high-fat ingredients with low-fat or nonfat ones  This may cause baked goods to be drier than usual  You may need to use nonfat cooking spray on pans to prevent food from sticking  You also may need to change the amount of other ingredients, such as water, in the recipe  Try the following:   Use low-fat or light margarine instead of regular margarine or shortening   Use lean ground turkey breast or chicken, or lean ground beef (less than 5% fat) instead of hamburger   Add 1 teaspoon of canola oil to 8 ounces of skim milk instead of using cream or half and half   Use grated zucchini, carrots, or apples in breads instead of coconut   Use blenderized, low-fat cottage cheese, plain tofu, or low-fat ricotta cheese instead of cream cheese   Use 1 egg white and 1 teaspoon of canola oil, or use ¼ cup (2 ounces) of fat-free egg substitute instead of a whole egg        Replace half of the oil that is called for in a recipe with applesauce when you bake  Use 3 tablespoons of cocoa powder and 1 tablespoon of canola oil instead of a square of baking chocolate  How to increase fiber:  Eat enough high-fiber foods to get 20 to 30 grams of fiber every day  Slowly increase your fiber intake to avoid stomach cramps, gas, and other problems   Eat 3 ounces of whole-grain foods each day  An ounce is about 1 slice of bread  Eat whole-grain breads, such as whole-wheat bread  Whole wheat, whole-wheat flour, or other whole grains should be listed as the first ingredient on the food label  Replace white flour with whole-grain flour or use half of each in recipes  Whole-grain flour is heavier than white flour, so you may have to add more yeast or baking powder   Eat a high-fiber cereal for breakfast   Oatmeal is a good source of soluble fiber  Look for cereals that have bran or fiber in the name  Choose whole-grain products, such as brown rice, barley, and whole-wheat pasta   Eat more beans, peas, and lentils  For example, add beans to soups or salads  Eat at least 5 cups of fruits and vegetables each day  Eat fruits and vegetables with the peel because the peel is high in fiber  © Copyright [employer] 2022 Information is for End User's use only and may not be sold, redistributed or otherwise used for commercial purposes  The above information is an  only  It is not intended as medical advice for individual conditions or treatments  Talk to your doctor, nurse or pharmacist before following any medical regimen to see if it is safe and effective for you  Heart Healthy Diet   AMBULATORY CARE:   A heart healthy diet  is an eating plan low in unhealthy fats and sodium (salt)  The plan is high in healthy fats and fiber  A heart healthy diet helps improve your cholesterol levels and lowers your risk for heart disease and stroke  A dietitian will teach you how to read and understand food labels    Heart healthy diet guidelines to follow:    Choose foods that contain healthy fats:      ? Unsaturated fats  include monounsaturated and polyunsaturated fats  Unsaturated fat is found in foods such as soybean, canola, olive, corn, and safflower oils  It is also found in soft tub margarine that is made with liquid vegetable oil  ? Omega-3 fat  is found in certain fish, such as salmon, tuna, and trout, and in walnuts and flaxseed  Eat fish high in omega-3 fats at least 2 times a week   Limit or do not have unhealthy fats:      ? Cholesterol  is found in animal foods, such as eggs and lobster, and in dairy products made from whole milk  Limit cholesterol to less than 200 mg each day  ? Saturated fat  is found in meats, such as cuevas and hamburger  It is also found in chicken or turkey skin, whole milk, and butter  Limit saturated fat to less than 7% of your total daily calories  ? Trans fat  is found in packaged foods, such as potato chips and cookies  It is also in hard margarine, some fried foods, and shortening  Do not eat foods that contain trans fats   Get 20 to 30 grams of fiber each day  Fruits, vegetables, whole-grain foods, and legumes (cooked beans) are good sources of fiber   Limit sodium as directed  You may be told to limit sodium, such as to 2,000 mg or less each day  Choose low-sodium or no-salt-added foods  Add little or no salt to food you prepare  Use herbs and spices in place of salt  Include the following in your heart healthy plan:  Ask your dietitian or healthcare provider how many servings to have each day from the following food groups:   Grains:      ? Whole-wheat breads, cereals, and pastas, and brown rice    ? Low-fat, low-sodium crackers and chips     Vegetables:      ? Broccoli, green beans, green peas, and spinach    ? Collards, kale, and lima beans    ? Carrots, sweet potatoes, tomatoes, and peppers    ?  Canned vegetables with no salt added     Fruits:      ? Bananas, peaches, pears, and pineapple    ? Grapes, raisins, and dates    ? Oranges, tangerines, grapefruit, orange juice, and grapefruit juice    ? Apricots, mangoes, melons, and papaya    ? Raspberries and strawberries    ? Canned fruit with no added sugar     Low-fat dairy:      ? Nonfat (skim) milk, 1% milk, and low-fat almond, cashew, or soy milks fortified with calcium    ? Low-fat cheese, regular or frozen yogurt, and cottage cheese     Meats and proteins:      ? Lean cuts of beef and pork (loin, leg, round), skinless chicken and turkey    ? Legumes, soy products, egg whites, or nuts    Limit or do not include the following in your heart healthy plan:    Foods and liquids that contain unhealthy fats and oils:      ? Whole or 2% milk, cream cheese, sour cream, or cheese    ? High-fat cuts of beef (T-bone steaks, ribs), chicken or turkey with skin, and organ meats such as liver    ? Butter, stick margarine, shortening, and cooking oils such as coconut or palm oil     Foods and liquids high in sodium:      ? Packaged foods, such as frozen dinners, cookies, macaroni and cheese, and cereals with more than 300 mg of sodium per serving    ? Vegetables with added sodium, such as instant potatoes, vegetables with added sauces, or regular canned vegetables    ? Cured or smoked meats, such as hot dogs, cuevas, and sausage    ? High-sodium ketchup, barbecue sauce, salad dressing, pickles, olives, soy sauce, or miso     Foods and liquids high in sugar:      ? Candy, cake, cookies, pies, or doughnuts    ? Soft drinks (soda), sports drinks, or sweetened tea    ? Canned or dry mixes for cakes, soups, sauces, or gravies    Other healthy heart guidelines:    Do not smoke  Nicotine and other chemicals in cigarettes and cigars can cause lung and heart damage  Ask your healthcare provider for information if you currently smoke and need help to quit  E-cigarettes or smokeless tobacco still contain nicotine   Talk to your provider before you use these products   Limit or do not drink alcohol as directed  Alcohol can damage your heart and raise your blood pressure  Your healthcare provider may give you specific daily and weekly limits  The general recommended limit is 1 drink a day for women 21 or older and for men 72 or older  Do not have more than 3 drinks within 24 hours or 7 within a week  The recommended limit is 2 drinks a day for men 24to 59years of age  Do not have more than 4 drinks within 24 hours or 14 within a week  A drink of alcohol is 12 ounces of beer, 5 ounces of wine, or 1½ ounces of liquor   Maintain a healthy weight  Extra body weight makes your heart work harder  Ask your provider what a healthy weight is for you  He or she can help you create a safe weight loss plan, if needed   Exercise regularly  Exercise can help you maintain a healthy weight and improve your blood pressure and cholesterol levels  Regular exercise can also decrease your risk for heart problems  Ask your provider about the best exercise plan for you  Do not start an exercise program without asking your provider  Follow up with your doctor or cardiologist as directed:  Write down your questions so you remember to ask them during your visits  © Copyright Renee Henriquez 2022 Information is for End User's use only and may not be sold, redistributed or otherwise used for commercial purposes  The above information is an  only  It is not intended as medical advice for individual conditions or treatments  Talk to your doctor, nurse or pharmacist before following any medical regimen to see if it is safe and effective for you

## 2023-05-03 ENCOUNTER — APPOINTMENT (OUTPATIENT)
Dept: LAB | Facility: HOSPITAL | Age: 58
End: 2023-05-03

## 2023-05-03 DIAGNOSIS — E03.9 HYPOTHYROIDISM, UNSPECIFIED TYPE: ICD-10-CM

## 2023-05-03 DIAGNOSIS — F31.9 BIPOLAR AFFECTIVE DISORDER, REMISSION STATUS UNSPECIFIED (HCC): ICD-10-CM

## 2023-05-03 DIAGNOSIS — Z00.00 ROUTINE ADULT HEALTH MAINTENANCE: ICD-10-CM

## 2023-05-03 DIAGNOSIS — E78.00 PURE HYPERCHOLESTEROLEMIA, UNSPECIFIED: ICD-10-CM

## 2023-05-03 DIAGNOSIS — Z12.31 ENCOUNTER FOR SCREENING MAMMOGRAM FOR MALIGNANT NEOPLASM OF BREAST: ICD-10-CM

## 2023-05-03 DIAGNOSIS — R20.0 NUMBNESS AND TINGLING IN BOTH HANDS: ICD-10-CM

## 2023-05-03 DIAGNOSIS — E78.2 MIXED HYPERLIPIDEMIA: ICD-10-CM

## 2023-05-03 DIAGNOSIS — Z12.11 SCREENING FOR COLON CANCER: ICD-10-CM

## 2023-05-03 DIAGNOSIS — R20.2 NUMBNESS AND TINGLING IN BOTH HANDS: ICD-10-CM

## 2023-05-03 DIAGNOSIS — D47.3 ESSENTIAL THROMBOCYTOSIS (HCC): ICD-10-CM

## 2023-05-03 LAB
ALBUMIN SERPL BCP-MCNC: 4.1 G/DL (ref 3.5–5)
ALP SERPL-CCNC: 62 U/L (ref 34–104)
ALT SERPL W P-5'-P-CCNC: 14 U/L (ref 7–52)
ANION GAP SERPL CALCULATED.3IONS-SCNC: 8 MMOL/L (ref 4–13)
AST SERPL W P-5'-P-CCNC: 15 U/L (ref 13–39)
BASOPHILS # BLD AUTO: 0.08 THOUSANDS/ÂΜL (ref 0–0.1)
BASOPHILS NFR BLD AUTO: 1 % (ref 0–1)
BILIRUB SERPL-MCNC: 0.28 MG/DL (ref 0.2–1)
BUN SERPL-MCNC: 12 MG/DL (ref 5–25)
CALCIUM SERPL-MCNC: 9.5 MG/DL (ref 8.4–10.2)
CHLORIDE SERPL-SCNC: 107 MMOL/L (ref 96–108)
CO2 SERPL-SCNC: 24 MMOL/L (ref 21–32)
CREAT SERPL-MCNC: 0.86 MG/DL (ref 0.6–1.3)
EOSINOPHIL # BLD AUTO: 0.37 THOUSAND/ÂΜL (ref 0–0.61)
EOSINOPHIL NFR BLD AUTO: 5 % (ref 0–6)
ERYTHROCYTE [DISTWIDTH] IN BLOOD BY AUTOMATED COUNT: 13 % (ref 11.6–15.1)
GFR SERPL CREATININE-BSD FRML MDRD: 74 ML/MIN/1.73SQ M
GLUCOSE SERPL-MCNC: 117 MG/DL (ref 65–140)
HCT VFR BLD AUTO: 41.3 % (ref 34.8–46.1)
HGB BLD-MCNC: 13.6 G/DL (ref 11.5–15.4)
IMM GRANULOCYTES # BLD AUTO: 0.02 THOUSAND/UL (ref 0–0.2)
IMM GRANULOCYTES NFR BLD AUTO: 0 % (ref 0–2)
LYMPHOCYTES # BLD AUTO: 2.57 THOUSANDS/ÂΜL (ref 0.6–4.47)
LYMPHOCYTES NFR BLD AUTO: 32 % (ref 14–44)
MCH RBC QN AUTO: 29 PG (ref 26.8–34.3)
MCHC RBC AUTO-ENTMCNC: 32.9 G/DL (ref 31.4–37.4)
MCV RBC AUTO: 88 FL (ref 82–98)
MONOCYTES # BLD AUTO: 0.63 THOUSAND/ÂΜL (ref 0.17–1.22)
MONOCYTES NFR BLD AUTO: 8 % (ref 4–12)
NEUTROPHILS # BLD AUTO: 4.27 THOUSANDS/ÂΜL (ref 1.85–7.62)
NEUTS SEG NFR BLD AUTO: 54 % (ref 43–75)
NRBC BLD AUTO-RTO: 0 /100 WBCS
PLATELET # BLD AUTO: 373 THOUSANDS/UL (ref 149–390)
PMV BLD AUTO: 9.1 FL (ref 8.9–12.7)
POTASSIUM SERPL-SCNC: 3.8 MMOL/L (ref 3.5–5.3)
PROT SERPL-MCNC: 6.5 G/DL (ref 6.4–8.4)
RBC # BLD AUTO: 4.69 MILLION/UL (ref 3.81–5.12)
SODIUM SERPL-SCNC: 139 MMOL/L (ref 135–147)
TSH SERPL DL<=0.05 MIU/L-ACNC: 2.12 UIU/ML (ref 0.45–4.5)
WBC # BLD AUTO: 7.94 THOUSAND/UL (ref 4.31–10.16)

## 2023-05-15 ENCOUNTER — APPOINTMENT (OUTPATIENT)
Dept: RADIOLOGY | Facility: MEDICAL CENTER | Age: 58
End: 2023-05-15

## 2023-05-15 ENCOUNTER — OFFICE VISIT (OUTPATIENT)
Dept: PODIATRY | Facility: CLINIC | Age: 58
End: 2023-05-15

## 2023-05-15 VITALS
BODY MASS INDEX: 31.99 KG/M2 | DIASTOLIC BLOOD PRESSURE: 68 MMHG | HEART RATE: 68 BPM | SYSTOLIC BLOOD PRESSURE: 128 MMHG | WEIGHT: 216 LBS | HEIGHT: 69 IN

## 2023-05-15 DIAGNOSIS — M79.672 LEFT FOOT PAIN: Primary | ICD-10-CM

## 2023-05-15 DIAGNOSIS — L84 CORN OF FOOT: ICD-10-CM

## 2023-05-15 DIAGNOSIS — M20.42 HAMMER TOE OF LEFT FOOT: ICD-10-CM

## 2023-05-15 DIAGNOSIS — L84 HELOMA MOLLE: ICD-10-CM

## 2023-05-15 DIAGNOSIS — M79.672 LEFT FOOT PAIN: ICD-10-CM

## 2023-05-15 RX ORDER — CHLORHEXIDINE GLUCONATE 0.12 MG/ML
15 RINSE ORAL ONCE
OUTPATIENT
Start: 2023-05-15 | End: 2023-05-15

## 2023-05-15 NOTE — PROGRESS NOTES
Assessment/Plan:    No problem-specific Assessment & Plan notes found for this encounter  Diagnoses and all orders for this visit:    Left foot pain  -     X-ray foot left 3+ views; Future  -     Case request operating room: REPAIR HAMMERTOE / MALLET TOE / CLAW TOE, L 4, 5 hammertoe repair ; Standing  -     Case request operating room: REPAIR HAMMERTOE / MALLET TOE / CLAW TOE, L 4, 5 hammertoe repair  [place] of foot  -     Ambulatory Referral to Podiatry  -     Case request operating room: REPAIR HAMMERTOE / MALLET TOE / CLAW TOE, L 4, 5 hammertoe repair ; Standing  -     Case request operating room: REPAIR HAMMERTOE / MALLET TOE / CLAW TOE, L 4, 5 hammertoe repair  [place] molle    Hammer toe of left foot  -     Case request operating room: REPAIR HAMMERTOE / MALLET TOE / CLAW TOE, L 4, 5 hammertoe repair ; Standing  -     Case request operating room: REPAIR HAMMERTOE / MALLET TOE / CLAW TOE, L 4, 5 hammertoe repair  Other orders  -     Nursing Communication [address] [place] [address] Interventions Implemented; Standing  -     Nursing Communication CHG bath, have staff wash entire body (neck down) per pre-op bathing protocol  Routine, evening prior to, and day of surgery ; Standing  -     Nursing Communication Swab both nares with Povidone-Iodine solution, EXCLUDE if patient has shellfish/Iodine allergy, and replace with nasal alcohol swabstick  Routine, day of surgery, on call to OR; Standing  -     chlorhexidine (PERIDEX) 0 12 % oral rinse 15 mL  -     Void on call to OR; Standing  -     Insert peripheral IV;  Standing  -     Diet NPO; Sips with meds; Standing  -     ceFAZolin (ANCEF) 2,000 mg in dextrose 5 % 100 mL IVPB      - XR 3 views taken and reviewed of the left foot and show adductovarus L 5th toethat is abutting the 4th toe  -She has exhausted all conforms of conservative care we will plan for hammertoe repair of the left fourth and also left fifth toes  - She will be in open toed surgical shoe for  about 4-week status post surgical intervention, expected protrusion of K wire between 2 and 4 weeks status post surgical intervention with hopeful return to work at 6 to 7 weeks    Patient was counseled and educated on the condition and the diagnosis  The diagnosis, treatment options and prognosis were discussed with the patient  The patient failed conservative care at this time and wished to proceed with surgical treatment  Explained surgical details and post-op course  Discussed all risks and complications related to the patient's condition and surgery  The benefits of surgery were also discussed  The patient understood that the surgery would not guarantee desirable outcome  All questions and concerns were addressed and the consent was signed  Subjective:      Patient ID: Michael Ba is a 60 y o  female  Patient presents for evaluation management of left foot, she does work as a EMS through [employer], she does wear close toed shoes and has tried wide shoes, padding etc  and she is having severe pain and fears of activities of daily living  She does need to sneak slippers into work to allow her to live with controlled pain      The following portions of the patient's history were reviewed and updated as appropriate: allergies, current medications, past family history, past medical history, past social history, past surgical history and problem list     Review of Systems   Constitutional: Negative for chills and fever  HENT: Negative for ear pain and sore throat  Eyes: Negative for pain and visual disturbance  Respiratory: Negative for cough and shortness of breath  Cardiovascular: Negative for chest pain and palpitations  Gastrointestinal: Negative for abdominal pain and vomiting  Genitourinary: Negative for dysuria and hematuria  Musculoskeletal: Negative for arthralgias and back pain  Skin: Negative for color change and rash  Neurological: Negative for seizures and syncope  "  All other systems reviewed and are negative  Objective:      /68 (BP Location: Right arm, Patient Position: Sitting, Cuff Size: Extra-Large)   Pulse 68   Ht 5' 9\" (1 753 m)   Wt 98 kg (216 lb)   BMI 31 90 kg/m²          Physical Exam  Vitals reviewed  Constitutional:       Appearance: Normal appearance  She is obese  HENT:      Head: Normocephalic and atraumatic  Nose: Nose normal       Mouth/Throat:      Mouth: Mucous membranes are moist    Eyes:      Pupils: Pupils are equal, round, and reactive to light  Cardiovascular:      Pulses: Normal pulses  Pulmonary:      Effort: Pulmonary effort is normal    Musculoskeletal:         General: Tenderness and deformity present  Comments: TTP along the inner aspect of the left 4,5 hammertoes  Callus on the lateral aspect of the left 4th toe  Skin:     Capillary Refill: Capillary refill takes less than 2 seconds  Neurological:      General: No focal deficit present  Mental Status: She is alert and oriented to person, place, and time  Mental status is at baseline           "

## 2023-05-18 ENCOUNTER — PREP FOR PROCEDURE (OUTPATIENT)
Dept: OBGYN CLINIC | Facility: CLINIC | Age: 58
End: 2023-05-18

## 2023-05-19 ENCOUNTER — TELEPHONE (OUTPATIENT)
Dept: OBGYN CLINIC | Facility: CLINIC | Age: 58
End: 2023-05-19

## 2023-05-19 NOTE — TELEPHONE ENCOUNTER
Caller: Patient    Doctor/Office: Dr Newton     Call regarding :  POD      Call was transferred to: POD

## 2023-05-31 ENCOUNTER — TELEPHONE (OUTPATIENT)
Dept: OBGYN CLINIC | Facility: CLINIC | Age: 58
End: 2023-05-31

## 2023-05-31 NOTE — TELEPHONE ENCOUNTER
Pt would like a phone call regarding questions about aftercare for upcoming surgery  Please advise  Thank you

## 2023-06-06 ENCOUNTER — ANESTHESIA EVENT (OUTPATIENT)
Dept: PERIOP | Facility: HOSPITAL | Age: 58
End: 2023-06-06
Payer: COMMERCIAL

## 2023-06-06 ENCOUNTER — PREP FOR PROCEDURE (OUTPATIENT)
Dept: OBGYN CLINIC | Facility: CLINIC | Age: 58
End: 2023-06-06

## 2023-06-06 NOTE — ANESTHESIA PREPROCEDURE EVALUATION
Procedure:  REPAIR HAMMERTOE / MALLET TOE / CLAW TOE, L 4, 5 hammertoe repair  (Left: Foot)    ECG: NSR with nonspecific ST abnormality     Bipolar Lamictal  Obese class I  Hx of thyroid nodule describes slightly impaired vocal cord  Sometimes needs to be propped up on a pillow due to a sensation of difficulty breathing feels due to nodule  Denies the following: CP/SOB with exertion, asthma, COPD, ISABEL, stroke/TIA, seizure      Relevant Problems   CARDIO   (+) Mixed hyperlipidemia   (+) Pure hypercholesterolemia, unspecified      ENDO   (+) Hypothyroidism   (+) Postoperative hypothyroidism      GI/HEPATIC   (+) Dysphagia      MUSCULOSKELETAL   (+) Osteoarthritis      NEURO/PSYCH   (+) Numbness and tingling in both hands        Physical Exam    Airway    Mallampati score: II  TM Distance: >3 FB  Neck ROM: full     Dental   No notable dental hx     Cardiovascular      Pulmonary      Other Findings        Anesthesia Plan  ASA Score- 2     Anesthesia Type- IV sedation with anesthesia with ASA Monitors  Additional Monitors:   Airway Plan:           Plan Factors-Exercise tolerance (METS): >4 METS  Chart reviewed  EKG reviewed  Existing labs reviewed  Patient summary reviewed  Patient is not a current smoker  Obstructive sleep apnea risk education given perioperatively  Induction-     Postoperative Plan-     Informed Consent- Anesthetic plan and risks discussed with patient  I personally reviewed this patient with the CRNA  Discussed and agreed on the Anesthesia Plan with the CRNA  Nathaniel Hector

## 2023-06-07 ENCOUNTER — TELEPHONE (OUTPATIENT)
Dept: PODIATRY | Facility: CLINIC | Age: 58
End: 2023-06-07

## 2023-06-07 ENCOUNTER — OFFICE VISIT (OUTPATIENT)
Dept: INTERNAL MEDICINE CLINIC | Facility: CLINIC | Age: 58
End: 2023-06-07
Payer: COMMERCIAL

## 2023-06-07 VITALS
BODY MASS INDEX: 32.7 KG/M2 | HEART RATE: 68 BPM | TEMPERATURE: 97.8 F | HEIGHT: 69 IN | DIASTOLIC BLOOD PRESSURE: 74 MMHG | WEIGHT: 220.8 LBS | OXYGEN SATURATION: 99 % | SYSTOLIC BLOOD PRESSURE: 126 MMHG

## 2023-06-07 DIAGNOSIS — M20.42 HAMMERTOE OF LEFT FOOT: ICD-10-CM

## 2023-06-07 DIAGNOSIS — Z01.818 PREOPERATIVE CLEARANCE: Primary | ICD-10-CM

## 2023-06-07 PROCEDURE — 99213 OFFICE O/P EST LOW 20 MIN: CPT | Performed by: NURSE PRACTITIONER

## 2023-06-07 NOTE — PROGRESS NOTES
Name: Pratibha Reyes      : 1965      MRN: [id number]  Encounter Provider: MARIANN Flores  Encounter Date: 2023   Encounter department: [address] [place] [address],[address]    Assessment & Plan Patient cleared for surgery on the  with Dr Jose Olmos  Will follow up after surgery  1  Preoperative clearance    2  Hammertoe of left foot           Subjective      Xenianita Mainland is for a preop clearance  She is having hammertoe surgery on the  with Dr Jose Olmos  She is having IV sedation  She states she is nervous about surgery but ready for her foot the feel better  She offers no other issues  Review of Systems   All other systems reviewed and are negative  Current Outpatient Medications on File Prior to Visit   Medication Sig   • ibuprofen (MOTRIN) 800 mg tablet Take 1 tablet (800 mg total) by mouth every 8 (eight) hours as needed for mild pain or moderate pain for up to 5 days   • lamoTRIgine (LaMICtal) 150 MG tablet Take 150 mg by mouth daily   • Synthroid 100 MCG tablet Take 1 tablet (100 mcg total) by mouth daily       Objective     There were no vitals taken for this visit  Physical Exam  Vitals reviewed  Constitutional:       Appearance: Normal appearance  She is normal weight  HENT:      Head: Normocephalic and atraumatic  Right Ear: Tympanic membrane, ear canal and external ear normal       Left Ear: Tympanic membrane, ear canal and external ear normal       Nose: Nose normal       Mouth/Throat:      Mouth: Mucous membranes are moist       Pharynx: Oropharynx is clear  Eyes:      Extraocular Movements: Extraocular movements intact  Conjunctiva/sclera: Conjunctivae normal       Pupils: Pupils are equal, round, and reactive to light  Cardiovascular:      Rate and Rhythm: Normal rate and regular rhythm  Pulses: Normal pulses  Heart sounds: Normal heart sounds     Pulmonary:      Effort: Pulmonary effort is normal       Breath sounds: Normal breath sounds  Abdominal:      General: Abdomen is flat  Bowel sounds are normal       Palpations: Abdomen is soft  Musculoskeletal:         General: Normal range of motion  Cervical back: Normal range of motion and neck supple  Skin:     General: Skin is warm and dry  Capillary Refill: Capillary refill takes less than 2 seconds  Neurological:      General: No focal deficit present  Mental Status: She is alert and oriented to person, place, and time  Mental status is at baseline  Psychiatric:         Mood and Affect: Mood normal          Behavior: Behavior normal          Thought Content:  Thought content normal          Judgment: Judgment normal        MARIANN Donnelly

## 2023-06-07 NOTE — TELEPHONE ENCOUNTER
Caller: patient    Doctor: Alma Honeycutt / Montie Bloch    Reason for call: Patient following up on paperwork  Please call her  Also, date are incorrect for surgery      Call back#: [phone]

## 2023-06-08 ENCOUNTER — TELEPHONE (OUTPATIENT)
Dept: PODIATRY | Facility: CLINIC | Age: 58
End: 2023-06-08

## 2023-06-12 ENCOUNTER — TELEPHONE (OUTPATIENT)
Dept: PODIATRY | Facility: CLINIC | Age: 58
End: 2023-06-12

## 2023-06-12 RX ORDER — ACETAMINOPHEN 500 MG
1000 TABLET ORAL EVERY 6 HOURS PRN
COMMUNITY

## 2023-06-12 NOTE — PRE-PROCEDURE INSTRUCTIONS
Pre-Surgery Instructions:   Medication Instructions   • acetaminophen (TYLENOL) 500 mg tablet Uses PRN- OK to take day of surgery   • lamoTRIgine (LaMICtal) 150 MG tablet Take day of surgery  • Synthroid 100 MCG tablet Take day of surgery  See above      Jm Innis Medication instructions for day surgery reviewed  Please use only a sip of water to take your instructed medications  Avoid all over the counter vitamins, supplements and NSAIDS for one week prior to surgery per anesthesia guidelines  Tylenol is ok to take as needed  You will receive a call one business day prior to surgery with an arrival time and hospital directions  If your surgery is scheduled on a Monday, the hospital will be calling you on the Friday prior to your surgery  If you have not heard from anyone by 8pm, please call the hospital supervisor through the hospital  at [phone]  Nida Cotton [phone])  Do not eat or drink anything after midnight the night before your surgery, including candy, mints, lifesavers, or chewing gum  Do not drink alcohol 24hrs before your surgery  Try not to smoke at least 24hrs before your surgery  Follow the pre surgery showering instructions as listed in the [facility] Surgical Experience Booklet” or otherwise provided by your surgeon's office  Do not shave the surgical area 24 hours before surgery  Do not apply any lotions, creams, including makeup, cologne, deodorant, or perfumes after showering on the day of your surgery  No contact lenses, eye make-up, or artificial eyelashes  Remove nail polish, including gel polish, and any artificial, gel, or acrylic nails if possible  Remove all jewelry including rings and body piercing jewelry  Wear causal clothing that is easy to take on and off  Consider your type of surgery  Keep any valuables, jewelry, piercings at home  Please bring any specially ordered equipment (sling, braces) if indicated      Arrange for a responsible person to drive you to and from the hospital on the day of your surgery  Visitor Guidelines discussed  Call the surgeon's office with any new illnesses, exposures, or additional questions prior to surgery  Please reference your [facility] Surgical Experience Booklet” for additional information to prepare for your upcoming surgery

## 2023-06-12 NOTE — TELEPHONE ENCOUNTER
Caller: Alex Sen    Doctor: Kacy Post    Reason for call: Having SX 6/16  Would like a script for a knee scooter please    Please call pt with answer - ok to speak to dae - pt works 3rd shift    Call back#: [phone]

## 2023-06-14 ENCOUNTER — TELEPHONE (OUTPATIENT)
Dept: PODIATRY | Facility: CLINIC | Age: 58
End: 2023-06-14

## 2023-06-14 NOTE — TELEPHONE ENCOUNTER
Caller: Patient    Doctor: Jose Olmos / Rita Wayne    Reason for call: Would like a prescription for a knee scooter  Can we giver her one on her surgery day? Thank you      Call back#: [phone]

## 2023-06-15 ENCOUNTER — TELEPHONE (OUTPATIENT)
Dept: PODIATRY | Facility: CLINIC | Age: 58
End: 2023-06-15

## 2023-06-16 ENCOUNTER — ANESTHESIA (OUTPATIENT)
Dept: PERIOP | Facility: HOSPITAL | Age: 58
End: 2023-06-16
Payer: COMMERCIAL

## 2023-06-16 ENCOUNTER — APPOINTMENT (OUTPATIENT)
Dept: RADIOLOGY | Facility: HOSPITAL | Age: 58
End: 2023-06-16
Payer: COMMERCIAL

## 2023-06-16 ENCOUNTER — HOSPITAL ENCOUNTER (OUTPATIENT)
Facility: HOSPITAL | Age: 58
Setting detail: OUTPATIENT SURGERY
Discharge: HOME | End: 2023-06-16
Attending: PODIATRIST | Admitting: PODIATRIST
Payer: COMMERCIAL

## 2023-06-16 VITALS
BODY MASS INDEX: 32.58 KG/M2 | SYSTOLIC BLOOD PRESSURE: 137 MMHG | HEIGHT: 69 IN | RESPIRATION RATE: 18 BRPM | HEART RATE: 45 BPM | OXYGEN SATURATION: 98 % | DIASTOLIC BLOOD PRESSURE: 70 MMHG | WEIGHT: 220 LBS | TEMPERATURE: 97 F

## 2023-06-16 DIAGNOSIS — G89.18 POST-OPERATIVE PAIN: Primary | ICD-10-CM

## 2023-06-16 PROCEDURE — 99024 POSTOP FOLLOW-UP VISIT: CPT | Performed by: PODIATRIST

## 2023-06-16 PROCEDURE — C1713 ANCHOR/SCREW BN/BN,TIS/BN: HCPCS | Performed by: PODIATRIST

## 2023-06-16 PROCEDURE — 73630 X-RAY EXAM OF FOOT: CPT

## 2023-06-16 PROCEDURE — 28285 REPAIR OF HAMMERTOE: CPT | Performed by: PODIATRIST

## 2023-06-16 PROCEDURE — 76000 FLUOROSCOPY <1 HR PHYS/QHP: CPT

## 2023-06-16 DEVICE — K-WIRE TROCAR POINT BOTH ENDS .045                                    X 4: Type: IMPLANTABLE DEVICE | Site: FOOT | Status: FUNCTIONAL

## 2023-06-16 RX ORDER — LIDOCAINE HYDROCHLORIDE 20 MG/ML
INJECTION, SOLUTION EPIDURAL; INFILTRATION; INTRACAUDAL; PERINEURAL AS NEEDED
Status: DISCONTINUED | OUTPATIENT
Start: 2023-06-16 | End: 2023-06-16

## 2023-06-16 RX ORDER — PROPOFOL 10 MG/ML
INJECTION, EMULSION INTRAVENOUS CONTINUOUS PRN
Status: DISCONTINUED | OUTPATIENT
Start: 2023-06-16 | End: 2023-06-16

## 2023-06-16 RX ORDER — MAGNESIUM HYDROXIDE 1200 MG/15ML
LIQUID ORAL AS NEEDED
Status: DISCONTINUED | OUTPATIENT
Start: 2023-06-16 | End: 2023-06-16 | Stop reason: HOSPADM

## 2023-06-16 RX ORDER — ALBUTEROL SULFATE 2.5 MG/3ML
2.5 SOLUTION RESPIRATORY (INHALATION) ONCE AS NEEDED
Status: DISCONTINUED | OUTPATIENT
Start: 2023-06-16 | End: 2023-06-16 | Stop reason: HOSPADM

## 2023-06-16 RX ORDER — DEXAMETHASONE SODIUM PHOSPHATE 10 MG/ML
INJECTION, SOLUTION INTRAMUSCULAR; INTRAVENOUS AS NEEDED
Status: DISCONTINUED | OUTPATIENT
Start: 2023-06-16 | End: 2023-06-16

## 2023-06-16 RX ORDER — FENTANYL CITRATE 50 UG/ML
INJECTION, SOLUTION INTRAMUSCULAR; INTRAVENOUS AS NEEDED
Status: DISCONTINUED | OUTPATIENT
Start: 2023-06-16 | End: 2023-06-16

## 2023-06-16 RX ORDER — ACETAMINOPHEN 325 MG/1
650 TABLET ORAL EVERY 4 HOURS PRN
Status: DISCONTINUED | OUTPATIENT
Start: 2023-06-16 | End: 2023-06-16 | Stop reason: HOSPADM

## 2023-06-16 RX ORDER — ONDANSETRON 2 MG/ML
INJECTION INTRAMUSCULAR; INTRAVENOUS AS NEEDED
Status: DISCONTINUED | OUTPATIENT
Start: 2023-06-16 | End: 2023-06-16

## 2023-06-16 RX ORDER — FENTANYL CITRATE/PF 50 MCG/ML
25 SYRINGE (ML) INJECTION
Status: DISCONTINUED | OUTPATIENT
Start: 2023-06-16 | End: 2023-06-16 | Stop reason: HOSPADM

## 2023-06-16 RX ORDER — MIDAZOLAM HYDROCHLORIDE 2 MG/2ML
INJECTION, SOLUTION INTRAMUSCULAR; INTRAVENOUS AS NEEDED
Status: DISCONTINUED | OUTPATIENT
Start: 2023-06-16 | End: 2023-06-16

## 2023-06-16 RX ORDER — ONDANSETRON 2 MG/ML
4 INJECTION INTRAMUSCULAR; INTRAVENOUS ONCE AS NEEDED
Status: DISCONTINUED | OUTPATIENT
Start: 2023-06-16 | End: 2023-06-16 | Stop reason: HOSPADM

## 2023-06-16 RX ORDER — OXYCODONE HYDROCHLORIDE AND ACETAMINOPHEN 5; 325 MG/1; MG/1
1 TABLET ORAL EVERY 4 HOURS PRN
Status: DISCONTINUED | OUTPATIENT
Start: 2023-06-16 | End: 2023-06-16 | Stop reason: HOSPADM

## 2023-06-16 RX ORDER — CEFAZOLIN SODIUM 2 G/50ML
2000 SOLUTION INTRAVENOUS ONCE
Status: COMPLETED | OUTPATIENT
Start: 2023-06-16 | End: 2023-06-16

## 2023-06-16 RX ORDER — SODIUM CHLORIDE, SODIUM LACTATE, POTASSIUM CHLORIDE, CALCIUM CHLORIDE 600; 310; 30; 20 MG/100ML; MG/100ML; MG/100ML; MG/100ML
100 INJECTION, SOLUTION INTRAVENOUS CONTINUOUS
Status: DISCONTINUED | OUTPATIENT
Start: 2023-06-16 | End: 2023-06-16 | Stop reason: HOSPADM

## 2023-06-16 RX ORDER — OXYCODONE HYDROCHLORIDE AND ACETAMINOPHEN 5; 325 MG/1; MG/1
1 TABLET ORAL EVERY 4 HOURS PRN
Qty: 10 TABLET | Refills: 0 | Status: SHIPPED | OUTPATIENT
Start: 2023-06-16 | End: 2023-06-26

## 2023-06-16 RX ORDER — KETAMINE HCL IN NACL, ISO-OSM 100MG/10ML
SYRINGE (ML) INJECTION AS NEEDED
Status: DISCONTINUED | OUTPATIENT
Start: 2023-06-16 | End: 2023-06-16

## 2023-06-16 RX ORDER — HYDROMORPHONE HCL/PF 1 MG/ML
0.5 SYRINGE (ML) INJECTION
Status: DISCONTINUED | OUTPATIENT
Start: 2023-06-16 | End: 2023-06-16 | Stop reason: HOSPADM

## 2023-06-16 RX ORDER — GLYCOPYRROLATE 0.2 MG/ML
INJECTION INTRAMUSCULAR; INTRAVENOUS AS NEEDED
Status: DISCONTINUED | OUTPATIENT
Start: 2023-06-16 | End: 2023-06-16

## 2023-06-16 RX ORDER — CHLORHEXIDINE GLUCONATE 0.12 MG/ML
15 RINSE ORAL ONCE
Status: COMPLETED | OUTPATIENT
Start: 2023-06-16 | End: 2023-06-16

## 2023-06-16 RX ADMIN — PROPOFOL 60 MCG/KG/MIN: 10 INJECTION, EMULSION INTRAVENOUS at 07:40

## 2023-06-16 RX ADMIN — Medication 20 MG: at 07:39

## 2023-06-16 RX ADMIN — DEXAMETHASONE SODIUM PHOSPHATE 5 MG: 10 INJECTION, SOLUTION INTRAMUSCULAR; INTRAVENOUS at 07:57

## 2023-06-16 RX ADMIN — FENTANYL CITRATE 25 MCG: 50 INJECTION, SOLUTION INTRAMUSCULAR; INTRAVENOUS at 08:04

## 2023-06-16 RX ADMIN — GLYCOPYRROLATE 0.1 MCG: 0.2 INJECTION, SOLUTION INTRAMUSCULAR; INTRAVENOUS at 07:32

## 2023-06-16 RX ADMIN — FENTANYL CITRATE 25 MCG: 50 INJECTION, SOLUTION INTRAMUSCULAR; INTRAVENOUS at 07:39

## 2023-06-16 RX ADMIN — ONDANSETRON 4 MG: 2 INJECTION INTRAMUSCULAR; INTRAVENOUS at 07:32

## 2023-06-16 RX ADMIN — MIDAZOLAM 2 MG: 1 INJECTION INTRAMUSCULAR; INTRAVENOUS at 07:33

## 2023-06-16 RX ADMIN — CHLORHEXIDINE GLUCONATE 0.12% ORAL RINSE 15 ML: 1.2 LIQUID ORAL at 06:57

## 2023-06-16 RX ADMIN — LIDOCAINE HYDROCHLORIDE 80 MG: 20 INJECTION, SOLUTION EPIDURAL; INFILTRATION; INTRACAUDAL; PERINEURAL at 07:39

## 2023-06-16 RX ADMIN — CEFAZOLIN SODIUM 2000 MG: 2 SOLUTION INTRAVENOUS at 07:40

## 2023-06-16 RX ADMIN — FENTANYL CITRATE 25 MCG: 50 INJECTION, SOLUTION INTRAMUSCULAR; INTRAVENOUS at 07:53

## 2023-06-16 RX ADMIN — SODIUM CHLORIDE, SODIUM LACTATE, POTASSIUM CHLORIDE, AND CALCIUM CHLORIDE 100 ML/HR: .6; .31; .03; .02 INJECTION, SOLUTION INTRAVENOUS at 07:07

## 2023-06-16 RX ADMIN — FENTANYL CITRATE 25 MCG: 50 INJECTION, SOLUTION INTRAMUSCULAR; INTRAVENOUS at 08:12

## 2023-06-16 NOTE — ANESTHESIA POSTPROCEDURE EVALUATION
Post-Op Assessment Note    CV Status:  Stable  Pain Score: 0    Pain management: adequate     Mental Status:  Alert and awake   Hydration Status:  Euvolemic   PONV Controlled:  Controlled   Airway Patency:  Patent      Post Op Vitals Reviewed: Yes      Staff: Anesthesiologist, CRNA         There were no known notable events for this encounter      BP   126/83   Temp     Pulse  55   Resp 19   SpO2 95

## 2023-06-16 NOTE — DISCHARGE INSTR - AVS FIRST PAGE
Dr Leeann Garcia Instructions    1  Take your prescribed medication as directed  2  Upon arrival at home, lie down and elevate your surgical foot on 2 pillows  3  Stay off your feet as much as possible for the first 24-48 hours  This is when your feet first swell and may become painful  After 48 hours you may begin limited walking following these restrictions:     Weight-bearing as tolerated in surgical shoe    4  Drink large quantities of water  Consume no alcohol  Continue a well-balanced diet  5  Report any unusual discomfort or fever to this office  6  A limited amount of discomfort and swelling is to be expected  In some cases the skin may take on a bruised appearance  The surgical cleansing solution that was applied to your foot prior to the operation is dark in color and the operation site may appear to be oozing when it actually is not  7  A slight amount of blood is to be expected, and is no cause for alarm  Do not remove the dressings  If there is active bleeding and if the bleeding persists, add additional gauze to the bandage, apply direct pressure, elevate your feet and call this office  8  Do not get the dressings wet  As regular bathing may be inconvenient, sponge baths are recommended  9  When anesthesia wears off and if any discomfort should be present, apply an ice pack directly over the operated area for 15 minute intervals for several hours or until the pain leaves  (USE IN EXCESS OF 15 MINUTES COULD CAUSE FROSTBITE)  Do not use hot water bags or electric pads  A convenient icepack can be made by placing ice cubes in a plastic bag and covering this with a towel  10  Take over-the-counter laxative for constipation, this is common with use of narcotic medications

## 2023-06-16 NOTE — OP NOTE
OPERATIVE REPORT - Podiatry  PATIENT NAME: Marvie Cranker    :  1965  MRN: [id number]  Pt Location: [facility] OR ROOM 01    SURGERY DATE: 2023    Surgeon(s) and Role:     * Rene Godinez DPM - Primary     * Prince Holguin DPM - Assisting    Pre-op Diagnosis:  Corn of foot [L84]  Left foot pain [M79 672]  Hammer toe of left foot [M20 42]    Post-Op Diagnosis Codes:     * Corn of foot [L84]     * Left foot pain [M79 672]     * Hammer toe of left foot [M20 42]    Procedure(s) (LRB):  REPAIR HAMMERTOE / MALLET TOE / CLAW TOE, L 4, 5 hammertoe repair  (Left)    Specimen(s):  * No specimens in log *    Estimated Blood Loss:   Minimal    Drains:  * No LDAs found *    Anesthesia Type:   Conscious Sedation  with 10 ml of 1% Lidocaine and 0 5% Bupivacaine in a 1:1 mixture    Hemostasis:  Pneumatic ankle tourniquet set at 250 mmHg for 30 mins  Direct compression    Materials:  Implant Name Type Inv  Item Serial No   Lot No  LRB No  Used Action   K-WIRE 0 045 X 4 IN PLAIN 2 POINT TYPE A TROCAR - SN/A  K-WIRE 0 045 X 4 IN PLAIN 2 POINT TYPE A TROCAR N/A [employer] N/A Left 1 Implanted     3-0 vicryl  4-0 nylon    Injectables:  None    Operative Findings:  Consistent with Diagnosis    Complications:   None    Procedure and Technique:     Under mild sedation, the patient was brought into the operating room and placed on the operating room table in the supine position  IV sedation was achieved by anesthesia team and a universal timeout was performed where all parties are in agreement of correct patient, correct procedure and correct site  A pneumatic tourniquet was then placed over the patient's left lower extremity with ample padding  A digital block was performed consisting of 10 ml of local anesthetic  The foot was then prepped and draped in the usual aseptic manner   An esmarch bandage was used to exsangunate the foot and the pneumatic tourniquet was then inflated to 250 mmHg     4th digit hammertoe correction:  Attention was then directed to the 4th digit  Hammertoe deformity was present  A  dorsal linear incision was made from the metatarsal-phalangeal joint to the proximal interphalangeal joint  The incision was then deepened via sharp dissection through the subcutaneous tissues, ligating all venous vessels as necessary  Dissection was carried to the level of the EDL tendon  The tendon was then transected at that level  The PIPJ was then exposed and the medial and lateral collateral ligaments were incised to expose the head of the proximal phalanx  By use of sagittal saw, the head of the proximal phalanx was resected on digits 4  Push test showed that the contracture was resolved  A 0 045 K-wire was directed from base of middle phalanx to central distal aspect out of the toe  The K-wire then was aimed retrograde from middle phalanx to proximal phalanx with no gapping noted at the PIPJ  This was performed on the 4th digit  5th digit derotational arthroplasty:  Attention was then directed to the 5th digit  Hammertoe rotational deformity was present  A  dorsal oblique ellipse incision was made across the PIPJ  The incision was then deepened via sharp dissection through the subcutaneous tissues, ligating all venous vessels as necessary  Dissection was carried to the level of the EDL tendon  The tendon was then transected at that level  The PIPJ was then exposed and the medial and lateral collateral ligaments were incised to expose the head of the proximal phalanx  By use of sagittal saw, the head of the proximal phalanx was resected on digit 5  Push test showed that the contracture was resolved  The surgical incision was irrigated with copious amounts of normal sterile saline  The periosteal and capsular structures were reapproximated using 3-0 vicryl  Skin edges were reapproximated and closure was obtained utilizing 4-0 nylon  The foot was then cleansed and dried    The "incision site was dressed with adaptic, gauze  This was then covered with a Devora and an ACE wrap  The tourniquet was deflated at approximately 30 min and normal hyperemic response was noted to all digits  The patient tolerated the procedure and anesthesia well without immediate complications and transferred to PACU with vital signs stable  Dr Senait Peters was present during the entire procedure and participated in all key aspects  SIGNATURE: Erin Perkins DPM  DATE: June 16, 2023  TIME: 8:18 AM      Portions of the record may have been created with voice recognition software  Occasional wrong word or \"sound a like\" substitutions may have occurred due to the inherent limitations of voice recognition software  Read the chart carefully and recognize, using context, where substitutions have occurred                  "

## 2023-06-16 NOTE — DISCHARGE SUMMARY
Discharge Summary Outpatient Procedure Podiatry -   Milburn Landau 60 y o  female MRN: [id number]  Unit/Bed#: OR POOL Encounter: [id number]    Admission Date: 6/16/2023     Admitting Diagnosis: Corn of foot [L84]  Left foot pain [M79 672]  Hammer toe of left foot [M20 42]    Discharge Diagnosis: same    Procedures Performed: REPAIR HAMMERTOE / MALLET TOE / CLAW TOE, L 4, 5 hammertoe repair  : 57984 (CPT®)    Complications: none    Condition at Discharge: stable    Discharge instructions/Information to patient and family:   See after visit summary for information provided to patient and family  Provisions for Follow-Up Care/Important appointments:  See after visit summary for information related to follow-up care and any pertinent home health orders  Discharge Medications:  See after visit summary for reconciled discharge medications provided to patient and family

## 2023-06-23 ENCOUNTER — OFFICE VISIT (OUTPATIENT)
Dept: PODIATRY | Facility: CLINIC | Age: 58
End: 2023-06-23

## 2023-06-23 DIAGNOSIS — L84 CORN OF FOOT: Primary | ICD-10-CM

## 2023-06-23 DIAGNOSIS — M20.42 HAMMER TOE OF LEFT FOOT: ICD-10-CM

## 2023-06-23 DIAGNOSIS — L84 HELOMA MOLLE: ICD-10-CM

## 2023-06-23 DIAGNOSIS — M79.672 LEFT FOOT PAIN: ICD-10-CM

## 2023-06-23 PROCEDURE — 99024 POSTOP FOLLOW-UP VISIT: CPT | Performed by: PODIATRIST

## 2023-06-23 NOTE — PROGRESS NOTES
PATIENT:  Rosa Sunshine      1965    ASSESSMENT     1  Corn of foot        2  Left foot pain        3  Heloma molle        4  Hammer toe of left foot               PLAN  Patient is doing well post-operatively  Sutures left intact  Incision was cleaned with betadine and DSD applied to be kept C/D/I  Continue post-op care as instructed  Stressed on patient compliance about proper off-loading, staying off of feet, and proper dressing care  Call if any increase in pain, fevers, calf pain, shortness of breath, or general distress is noted  Patient instructed to go to ER if call is not returned immediately   -Weight-bear as tolerated in surgical shoe having minimal pain and bruising    HISTORY OF PRESENT ILLNESS  Patient presents for post-op appointment  Post-op pain is under control and resolving well  The patient is feeling well and in good spirits  Patient reported no post-op concern  Patient relates compliance with surgical shoe, elevation, and keeping dressing clean, dry and intact  REVIEW OF SYSTEMS  GENERAL: No fever or chills  HEART: No chest pain, or palpitation  RESPIRATORY:  No SOB or cough  GI: No Nausea, vomit or diarrhea  NEUROLOGIC: No syncope or acute weakness  MUSCULOSKELETAL: No calf pain or edema  PHYSICAL EXAMINATION  GENERAL  The patient appears in NAD / non-toxic  Afebrile  VSS    VASCULAR EXAM  Pedal pulses and vascular status are intact  No calf pain or edema bilaterally  No cyanosis  DERMATOLOGIC EXAM  Incision is coapted and healing well  No signs of infection  No active drainage  Normal post-op edema and ecchymosis  No necrosis or dehiscence  NEUROLOGIC EXAM  AAO X 3  No focal neurologic deficit  Neurologic status is intact BLE  MUSCULOSKELETAL EXAM  Good surgical correction  Normal post-op findings  ROM intact  No fluctuation or crepitus

## 2023-06-30 ENCOUNTER — OFFICE VISIT (OUTPATIENT)
Dept: PODIATRY | Facility: CLINIC | Age: 58
End: 2023-06-30

## 2023-06-30 VITALS — WEIGHT: 220 LBS | HEIGHT: 69 IN | BODY MASS INDEX: 32.58 KG/M2

## 2023-06-30 DIAGNOSIS — M20.42 HAMMER TOE OF LEFT FOOT: ICD-10-CM

## 2023-06-30 DIAGNOSIS — M79.672 LEFT FOOT PAIN: ICD-10-CM

## 2023-06-30 DIAGNOSIS — L84 HELOMA MOLLE: ICD-10-CM

## 2023-06-30 DIAGNOSIS — L84 CORN OF FOOT: Primary | ICD-10-CM

## 2023-06-30 PROBLEM — Z00.00 ROUTINE ADULT HEALTH MAINTENANCE: Status: RESOLVED | Noted: 2023-05-01 | Resolved: 2023-06-30

## 2023-06-30 PROCEDURE — 99024 POSTOP FOLLOW-UP VISIT: CPT | Performed by: PODIATRIST

## 2023-06-30 NOTE — PROGRESS NOTES
Assessment/Plan:      Diagnoses and all orders for this visit:    Leiter of foot    Heloma molle    Left foot pain    Hammer toe of left foot       -No evidence following suture removal, normal postoperative appearance, minimal bruising, minimal swelling consistent with postoperative course  - She is 2-week status post surgical intervention she is to return in 2 weeks for repeat x-rays and possible pin removal, we advised that we would like to have the pin in place for at least 6 weeks for positioning  - She is doing overall well, advised that she may shower but keep the pain covered she may then wash that area with soap and water and then apply Band-Aid and Betadine      Subjective:     Patient ID: lAex Elizabeth is a 60 y o  female  Patient transfer evaluation management and follow-up on her left foot  She is now 2-week status post kissing corn surgery  She presents today ambulating in surgical shoe with minimal pain minimal swelling      Review of Systems   Constitutional: Negative for chills and fever  HENT: Negative for ear pain and sore throat  Eyes: Negative for pain and visual disturbance  Respiratory: Negative for cough and shortness of breath  Cardiovascular: Negative for chest pain and palpitations  Gastrointestinal: Negative for abdominal pain and vomiting  Genitourinary: Negative for dysuria and hematuria  Musculoskeletal: Negative for arthralgias and back pain  Skin: Negative for color change and rash  Neurological: Negative for seizures and syncope  All other systems reviewed and are negative          Objective:     Physical Exam  Musculoskeletal:      Comments: Good postsurgical result incisions are healed, good clinical orientation of the left fifth toe pin is emanating from the left fourth toe in good clinical orientation as well, minimal pain

## 2023-07-14 ENCOUNTER — APPOINTMENT (OUTPATIENT)
Dept: RADIOLOGY | Facility: CLINIC | Age: 58
End: 2023-07-14
Payer: COMMERCIAL

## 2023-07-14 ENCOUNTER — OFFICE VISIT (OUTPATIENT)
Dept: PODIATRY | Facility: CLINIC | Age: 58
End: 2023-07-14

## 2023-07-14 VITALS — BODY MASS INDEX: 32.58 KG/M2 | HEIGHT: 69 IN | WEIGHT: 220 LBS

## 2023-07-14 DIAGNOSIS — Z98.890 STATUS POST HAMMER TOE CORRECTION: Primary | ICD-10-CM

## 2023-07-14 DIAGNOSIS — M79.672 LEFT FOOT PAIN: ICD-10-CM

## 2023-07-14 DIAGNOSIS — Z87.39 STATUS POST HAMMER TOE CORRECTION: ICD-10-CM

## 2023-07-14 DIAGNOSIS — Z87.39 STATUS POST HAMMER TOE CORRECTION: Primary | ICD-10-CM

## 2023-07-14 DIAGNOSIS — Z98.890 STATUS POST HAMMER TOE CORRECTION: ICD-10-CM

## 2023-07-14 PROCEDURE — 99024 POSTOP FOLLOW-UP VISIT: CPT | Performed by: PODIATRIST

## 2023-07-14 PROCEDURE — 73630 X-RAY EXAM OF FOOT: CPT

## 2023-07-14 NOTE — PROGRESS NOTES
Assessment/Plan:      Diagnoses and all orders for this visit:    Status post hammer toe correction  -     XR foot 3+ vw left; Future      -X-rays 3 views taken reviewed of the left foot weightbearing, no bony bridging across the left fourth toe in good orientation of the left fifth toe  - Continue surgical shoe, continue bandage, but she is to return in 2 weeks for pin removal  Subjective:     Patient ID: Aline Whitley is a 60 y.o. female. Patient presents for evaluation management of her left fourth toe, having minimal pain, unable to walk barefoot at this point in time, presents today ambulating surgical shoe with pin site covered compliant with overall course of treatment      Review of Systems   Constitutional: Negative for chills and fever. HENT: Negative for ear pain and sore throat. Eyes: Negative for pain and visual disturbance. Respiratory: Negative for cough and shortness of breath. Cardiovascular: Negative for chest pain and palpitations. Gastrointestinal: Negative for abdominal pain and vomiting. Genitourinary: Negative for dysuria and hematuria. Musculoskeletal: Negative for arthralgias and back pain. Skin: Negative for color change and rash. Neurological: Negative for seizures and syncope. All other systems reviewed and are negative.         Objective:     Physical Exam  Musculoskeletal:      Comments: Incisions are healed, swelling of the left fifth toe consistent with postoperative course, pin site is intact, normal postoperative appearance

## 2023-07-18 ENCOUNTER — TELEPHONE (OUTPATIENT)
Dept: PODIATRY | Facility: CLINIC | Age: 58
End: 2023-07-18

## 2023-07-18 NOTE — TELEPHONE ENCOUNTER
Caller: Rowan Davis    Doctor/Office: Dr. Brittany Weeks    #: [phone]    Escalation: Surgery Patient would like to know how long it will be until she is cleared to go back to work. Would like a return call. She would like to give her boss a [employer] return date if possible. Thank you.

## 2023-07-19 ENCOUNTER — TELEPHONE (OUTPATIENT)
Dept: PAIN MEDICINE | Facility: CLINIC | Age: 58
End: 2023-07-19

## 2023-07-28 ENCOUNTER — OFFICE VISIT (OUTPATIENT)
Dept: PODIATRY | Facility: CLINIC | Age: 58
End: 2023-07-28

## 2023-07-28 VITALS
WEIGHT: 222.2 LBS | SYSTOLIC BLOOD PRESSURE: 110 MMHG | DIASTOLIC BLOOD PRESSURE: 72 MMHG | HEIGHT: 69 IN | BODY MASS INDEX: 32.91 KG/M2 | HEART RATE: 51 BPM

## 2023-07-28 DIAGNOSIS — Z98.890 STATUS POST HAMMER TOE CORRECTION: Primary | ICD-10-CM

## 2023-07-28 DIAGNOSIS — Z87.39 STATUS POST HAMMER TOE CORRECTION: Primary | ICD-10-CM

## 2023-07-28 DIAGNOSIS — M79.672 LEFT FOOT PAIN: ICD-10-CM

## 2023-07-28 PROCEDURE — 99024 POSTOP FOLLOW-UP VISIT: CPT | Performed by: PODIATRIST

## 2023-07-28 NOTE — PROGRESS NOTES
Assessment/Plan:      Diagnoses and all orders for this visit:    Status post hammer toe correction    Left foot pain      -She is return to work in 2 weeks without restrictions  - Return in 4 weeks for repeat x-rays and hopeful final sign off  - She is to shasta tape her toes for any discomfort and removed today  Subjective:     Patient ID: Sabrina Vance is a 60 y.o. female. Patient pincer evaluation management for toe pain. She is having continued swelling and pain, her toes are large but she is here to have her pin removed. She is unsure if she can go back to work this week      Review of Systems   Constitutional: Negative for chills and fever. HENT: Negative for ear pain and sore throat. Eyes: Negative for pain and visual disturbance. Respiratory: Negative for cough and shortness of breath. Cardiovascular: Negative for chest pain and palpitations. Gastrointestinal: Negative for abdominal pain and vomiting. Genitourinary: Negative for dysuria and hematuria. Musculoskeletal: Negative for arthralgias and back pain. Skin: Negative for color change and rash. Neurological: Negative for seizures and syncope. All other systems reviewed and are negative. Objective:     Physical Exam  Musculoskeletal:      Comments: Normal postoperative appearance, range of motion intact but painful at the PIPJ of the left fourth toe.   Swollen toes, minimal cellulitis been removed today

## 2023-07-28 NOTE — LETTER
July 31, 2023     Patient: Janet Cruz  YOB: 1965  Date of Visit: 7/28/2023      To Whom it May Concern:    Janet Cruz is under my professional care. Yariel Maryparisa was seen in my office on 7/28/2023. Yariel Mahan is scheduled to return in 4 weeks (8/23/23). If you have any questions or concerns, please don't hesitate to call.          Sincerely,          Lila Cook DPM        CC: No Recipients

## 2023-08-23 ENCOUNTER — TELEPHONE (OUTPATIENT)
Age: 58
End: 2023-08-23

## 2023-08-23 ENCOUNTER — OFFICE VISIT (OUTPATIENT)
Dept: PODIATRY | Facility: CLINIC | Age: 58
End: 2023-08-23

## 2023-08-23 VITALS
SYSTOLIC BLOOD PRESSURE: 113 MMHG | BODY MASS INDEX: 32.81 KG/M2 | HEIGHT: 69 IN | HEART RATE: 58 BPM | DIASTOLIC BLOOD PRESSURE: 74 MMHG

## 2023-08-23 DIAGNOSIS — Z87.39 STATUS POST HAMMER TOE CORRECTION: Primary | ICD-10-CM

## 2023-08-23 DIAGNOSIS — Z98.890 STATUS POST HAMMER TOE CORRECTION: Primary | ICD-10-CM

## 2023-08-23 DIAGNOSIS — M79.672 LEFT FOOT PAIN: ICD-10-CM

## 2023-08-23 DIAGNOSIS — L84 HELOMA MOLLE: ICD-10-CM

## 2023-08-23 PROCEDURE — 99024 POSTOP FOLLOW-UP VISIT: CPT | Performed by: PODIATRIST

## 2023-08-23 NOTE — LETTER
August 24, 2023     Patient: Pinky Cevallos  YOB: 1965  Date of Visit: 8/23/2023      To Whom it May Concern:    Pinky Cevallos is under my professional care. Milagros Dillon was seen in my office on 8/23/2023. Milagros Dillon will be on restriction as follows , needs to take 15-minute periods of sitting every hour to allow her to take off her shoe until 9/20/2023 as [facility] will be returning for the next appointment on 9/20/2023 and hopeful transition back to full duty at that time. If you have any questions or concerns, please don't hesitate to call.          Sincerely,          Yanick Shah DPM        CC: No Recipients

## 2023-08-23 NOTE — LETTER
August 23, 2023     Patient: Arcadio Mooney  YOB: 1965  Date of Visit: 8/23/2023      To Whom it May Concern:    Arcadio Mooney is under my professional care. Migdalia Law was seen in my office on 8/23/2023. She can return Monday 8/28/23 with having to sit 15 min every hour. If you have any questions or concerns, please don't hesitate to call.          Sincerely,          Mary Ellen Louie DPM        CC: No Recipients

## 2023-08-23 NOTE — TELEPHONE ENCOUNTER
Caller: Ted [facility]    Doctor: Dr Maricarmen Austin    Reason for call: Patient forgot what brand sneakers Dr Maricarmen Austin was wearing she wants to purchase a pair     Call back#:[phone]

## 2023-08-23 NOTE — PROGRESS NOTES
Assessment/Plan:    No problem-specific Assessment & Plan notes found for this encounter. Diagnoses and all orders for this visit:    Status post hammer toe correction    Left foot pain    Heloma molle      -She may return to work, will give her a 15-minute periods of sitting every hour to allow her to take off her shoe and allow the soft tissue swelling to improve. - She will be on this restriction for the next month, she is return in 1 month for repeat assessment repeat x-rays and hopeful transition back to full duty  -Good clinical appearance of toes, heloma molle is resolved    Subjective:      Patient ID: Rich Green is a 60 y.o. female. Patient transfer evaluation management of her left foot. She is having some difficulty returning to her normal shoe she would like to go back to work but is having some difficulty being a closed toed shoe for long periods of time. 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  She presents today in advance of      The following portions of the patient's history were reviewed and updated as appropriate: allergies, current medications, past family history, past medical history, past social history, past surgical history and problem list.    Review of Systems   Constitutional: Negative for chills and fever. HENT: Negative for ear pain and sore throat. Eyes: Negative for pain and visual disturbance. Respiratory: Negative for cough and shortness of breath. Cardiovascular: Negative for chest pain and palpitations. Gastrointestinal: Negative for abdominal pain and vomiting. Genitourinary: Negative for dysuria and hematuria. Musculoskeletal: Negative for arthralgias and back pain. Skin: Negative for color change and rash. Neurological: Negative for seizures and syncope. All other systems reviewed and are negative.         Objective:      /74 (BP Location: Left arm, Patient Position: Sitting, Cuff Size: Large)   Pulse 58   Ht 5' 9" (1.753 m) BMI 32.81 kg/m²          Physical Exam  Musculoskeletal:      Comments: Dellar Faden has resolved, good orientation to the toes, there is continued soft tissue swelling which is moderate and apparently can become severe

## 2023-08-24 ENCOUNTER — TELEPHONE (OUTPATIENT)
Age: 58
End: 2023-08-24

## 2023-08-24 NOTE — TELEPHONE ENCOUNTER
Caller: Suha Torres    Doctor/Office: Dr. Kirk Steinberg    #: [phone]    Escalation: Surgery Patient would like to know what type of shoes Dr. Neil Mercado recommends for her so she can walk better and  have less pain. Her work is not letting her return at this time so she would like to buy these shoes in hopes of returning. Please call and advise. Thank you.

## 2023-08-24 NOTE — TELEPHONE ENCOUNTER
Caller: Lucia Arce    Doctor/Office: Dr. Wyvonne Primrose    #: [phone]    Escalation: Surgery Patient needs an updated work letter giving an approximate return to work date with no restrictions per [employer]. Patient would like to pick it up in the office. Please call and advise as to when this can be picked up.  Thank you

## 2023-09-06 ENCOUNTER — TELEPHONE (OUTPATIENT)
Age: 58
End: 2023-09-06

## 2023-09-06 NOTE — TELEPHONE ENCOUNTER
Caller: Thanh Fermin    Doctor/Office: Dr. Mary Moncada    #: [phone]    Escalation: Surgery Patient bought new shoes and has seen improvement in her feet. She is hoping to go back to work this coming Monday with no restrictions. She would like a return call to discuss.  Thank you

## 2023-09-06 NOTE — TELEPHONE ENCOUNTER
Patient is scheduled to f/up with you on 9/20/23 do you agree with patients below message?  If so I can add the letter and send to her

## 2023-09-07 ENCOUNTER — TELEPHONE (OUTPATIENT)
Age: 58
End: 2023-09-07

## 2023-09-07 NOTE — TELEPHONE ENCOUNTER
Caller: Stephane Garcia    Doctor: Quinn Schwab, DPM    Reason for call: Geoff White is just verifying that the return to work note has her going back with no restrictions on 9/11/23. Fax to:  [phone]  Attention:  Earnesteen Boas    Call back#: [phone] call Geoff White when this has been done.

## 2023-09-20 ENCOUNTER — OFFICE VISIT (OUTPATIENT)
Dept: PODIATRY | Facility: CLINIC | Age: 58
End: 2023-09-20
Payer: COMMERCIAL

## 2023-09-20 ENCOUNTER — APPOINTMENT (OUTPATIENT)
Dept: RADIOLOGY | Facility: CLINIC | Age: 58
End: 2023-09-20
Payer: COMMERCIAL

## 2023-09-20 VITALS
WEIGHT: 222 LBS | HEART RATE: 60 BPM | SYSTOLIC BLOOD PRESSURE: 101 MMHG | HEIGHT: 69 IN | DIASTOLIC BLOOD PRESSURE: 68 MMHG | BODY MASS INDEX: 32.88 KG/M2

## 2023-09-20 DIAGNOSIS — Z98.890 STATUS POST HAMMER TOE CORRECTION: Primary | ICD-10-CM

## 2023-09-20 DIAGNOSIS — Z87.39 STATUS POST HAMMER TOE CORRECTION: Primary | ICD-10-CM

## 2023-09-20 DIAGNOSIS — Z87.39 STATUS POST HAMMER TOE CORRECTION: ICD-10-CM

## 2023-09-20 DIAGNOSIS — Z98.890 STATUS POST HAMMER TOE CORRECTION: ICD-10-CM

## 2023-09-20 PROCEDURE — 73630 X-RAY EXAM OF FOOT: CPT

## 2023-09-20 PROCEDURE — 99213 OFFICE O/P EST LOW 20 MIN: CPT | Performed by: PODIATRIST

## 2023-09-20 NOTE — PROGRESS NOTES
Assessment/Plan:    No problem-specific Assessment & Plan notes found for this encounter. Diagnoses and all orders for this visit:    Status post hammer toe correction  -     X-ray foot left 3+ views; Future      X-rays 3 views taken reviewed of the left foot and do show resection of the has the proximal phalanx 4 and 5, normal postoperative appearance  - No further restrictions, she is to return as needed. Continue full-time work  -Elevation ice, oral anti-inflammatories as necessary      Subjective:      Patient ID: Iris Faust is a 60 y.o. female. Patient pincer evaluation management of her left foot, she is very happy with her new [employer] shoes. She is back to work and having some minimal pain. Very happy with the overall result      The following portions of the patient's history were reviewed and updated as appropriate: allergies, current medications, past family history, past medical history, past social history, past surgical history and problem list.    Review of Systems   Constitutional: Negative for chills and fever. HENT: Negative for ear pain and sore throat. Eyes: Negative for pain and visual disturbance. Respiratory: Negative for cough and shortness of breath. Cardiovascular: Negative for chest pain and palpitations. Gastrointestinal: Negative for abdominal pain and vomiting. Genitourinary: Negative for dysuria and hematuria. Musculoskeletal: Negative for arthralgias and back pain. Skin: Negative for color change and rash. Neurological: Negative for seizures and syncope. All other systems reviewed and are negative.         Objective:      /68   Pulse 60   Ht 5' 9" (1.753 m)   Wt 101 kg (222 lb)   BMI 32.78 kg/m²          Physical Exam  Musculoskeletal:      Comments: Swelling clinical appearance very good for postoperative course, now 3 months after surgical intervention, no pain with range of motion

## 2023-11-14 ENCOUNTER — TELEPHONE (OUTPATIENT)
Dept: INTERNAL MEDICINE CLINIC | Facility: CLINIC | Age: 58
End: 2023-11-14

## 2023-11-14 DIAGNOSIS — M25.552 BILATERAL HIP PAIN: Primary | ICD-10-CM

## 2023-11-14 DIAGNOSIS — M25.551 BILATERAL HIP PAIN: Primary | ICD-10-CM

## 2023-12-01 ENCOUNTER — CONSULT (OUTPATIENT)
Dept: PAIN MEDICINE | Facility: CLINIC | Age: 58
End: 2023-12-01
Payer: COMMERCIAL

## 2023-12-01 ENCOUNTER — APPOINTMENT (OUTPATIENT)
Dept: RADIOLOGY | Facility: MEDICAL CENTER | Age: 58
End: 2023-12-01
Payer: COMMERCIAL

## 2023-12-01 VITALS — RESPIRATION RATE: 16 BRPM | SYSTOLIC BLOOD PRESSURE: 124 MMHG | DIASTOLIC BLOOD PRESSURE: 79 MMHG | HEART RATE: 52 BPM

## 2023-12-01 DIAGNOSIS — M25.551 BILATERAL HIP PAIN: ICD-10-CM

## 2023-12-01 DIAGNOSIS — M51.36 LUMBAR DEGENERATIVE DISC DISEASE: ICD-10-CM

## 2023-12-01 DIAGNOSIS — G89.4 CHRONIC PAIN SYNDROME: ICD-10-CM

## 2023-12-01 DIAGNOSIS — M47.816 LUMBAR SPONDYLOSIS: ICD-10-CM

## 2023-12-01 DIAGNOSIS — M51.36 LUMBAR DEGENERATIVE DISC DISEASE: Primary | ICD-10-CM

## 2023-12-01 DIAGNOSIS — M25.552 BILATERAL HIP PAIN: ICD-10-CM

## 2023-12-01 PROBLEM — M51.369 LUMBAR DEGENERATIVE DISC DISEASE: Status: ACTIVE | Noted: 2023-12-01

## 2023-12-01 PROCEDURE — 72110 X-RAY EXAM L-2 SPINE 4/>VWS: CPT

## 2023-12-01 PROCEDURE — 99244 OFF/OP CNSLTJ NEW/EST MOD 40: CPT | Performed by: ANESTHESIOLOGY

## 2023-12-01 PROCEDURE — 73521 X-RAY EXAM HIPS BI 2 VIEWS: CPT

## 2023-12-01 NOTE — PROGRESS NOTES
Assessment:  1. Lumbar degenerative disc disease    2. Bilateral hip pain    3. Lumbar spondylosis        Plan:  Patient is a 76-year-old female complains of bilateral hip pain with left side worse than right presents to office for initial consultation. Patient reports difficulty standing from sitting position, going up stairs, laying flat secondary to severe sharp, stabbing pain in the hips. With physical exam it does show significant tenderness to rotation of the hip joint. At this time we will need further diagnostic imaging. 1.  We will order an x-ray of lumbar spine and of bilateral hips to assess the degenerative changes or correlate with patient's current presentation. 2.  We will trial diclofenac 50 mg p.o. twice daily  3. We will follow-up in 2 weeks at which we will then plan for interventional management which most likely be bilateral hip steroid injections        History of Present Illness: The patient is a 60 y.o. female who presents for consultation in regards to Hip Pain (BILATERAL). Symptoms have been present for 10 years. Symptoms began without any precipitating injury or trauma. Pain is reported to be 10 on the numeric rating scale. Symptoms are felt nearly constantly and worst in the nighttime. Symptoms are characterized as burning, cramping, shooting, sharp, dull/aching, and throbbing. Symptoms are associated with left leg weakness. Aggravating factors include kneeling, lying down, standing, bending, leaning forward, leaning bckward, sitting, walking, and exercise. Relieving factors include NOTHING. No change in symptoms with turning the head, relaxation, coughing/sneezing, and bowel movements. Treatments that have been helpful include heat/ice. home exercise and TENS unit have provided no relief. Medications to relieve symptoms include Voltaren, Tylenol, ibuprofen.     Review of Systems:    Review of Systems   Musculoskeletal:         Hip pain   All other systems reviewed and are negative.           Past Medical History:   Diagnosis Date    Anesthesia     Pt states it takes her a long time to wake    Bipolar 1 disorder ([address])     Disease of thyroid gland     Thyroid cancer ([address])        Past Surgical History:   Procedure Laterality Date    BLADDER NECK RECONSTRUCTION      BLADDER SUSPENSION      HYSTERECTOMY      MAMMO STEREOTACTIC BREAST BIOPSY RIGHT (ALL INC) Right 2022    NM CORRECTION HAMMERTOE Left 2023    Procedure: REPAIR HAMMERTOE / MALLET TOE / CLAW TOE, L 4, 5 hammertoe repair.;  Surgeon: Jim Beyer DPM;  Location: [facility];  Service: Podiatry    ROTATOR CUFF REPAIR Bilateral     done twice on each side    SHOULDER SURGERY      THYROIDECTOMY, PARTIAL         Family History   Problem Relation Age of Onset    No Known Problems Mother     No Known Problems Father     No Known Problems Sister     No Known Problems Sister     No Known Problems Sister     Throat cancer Maternal Grandfather     Breast cancer Maternal Aunt 61    Brain cancer Maternal Aunt     No Known Problems Paternal Aunt        Social History     Occupational History    Not on file   Tobacco Use    Smoking status: Former     Packs/day: 1.00     Types: Cigarettes     Quit date:      Years since quittin.9    Smokeless tobacco: Never   Vaping Use    Vaping Use: Never used   Substance and Sexual Activity    Alcohol use: No    Drug use: No    Sexual activity: Yes         Current Outpatient Medications:     acetaminophen (TYLENOL) 500 mg tablet, Take 1,000 mg by mouth every 6 (six) hours as needed for mild pain, Disp: , Rfl:     lamoTRIgine (LaMICtal) 150 MG tablet, Take 150 mg by mouth daily, Disp: , Rfl:     Synthroid 100 MCG tablet, Take 1 tablet (100 mcg total) by mouth daily, Disp: 90 tablet, Rfl: 3    Allergies   Allergen Reactions    Demerol [Meperidine]     Latex Rash       Physical Exam:    /79   Pulse (!) 52   Resp 16     Constitutional: normal, well developed, well nourished, alert, in no distress and non-toxic and no overt pain behavior. and obese  Eyes: anicteric  HEENT: grossly intact  Neck: supple, symmetric, trachea midline and no masses   Pulmonary:even and unlabored  Cardiovascular:No edema or pitting edema present  Skin:Normal without rashes or lesions and well hydrated  Psychiatric:Mood and affect appropriate  Neurologic:Cranial Nerves II-XII grossly intact  Musculoskeletal:antalgic, pain with flexion and internal/external rotation bilateral hips    Imaging            No orders to display       No orders of the defined types were placed in this encounter.

## 2023-12-07 DIAGNOSIS — Z00.6 ENCOUNTER FOR EXAMINATION FOR NORMAL COMPARISON OR CONTROL IN CLINICAL RESEARCH PROGRAM: ICD-10-CM

## 2023-12-20 ENCOUNTER — OFFICE VISIT (OUTPATIENT)
Dept: PAIN MEDICINE | Facility: CLINIC | Age: 58
End: 2023-12-20
Payer: COMMERCIAL

## 2023-12-20 VITALS
SYSTOLIC BLOOD PRESSURE: 105 MMHG | WEIGHT: 216.4 LBS | BODY MASS INDEX: 32.05 KG/M2 | RESPIRATION RATE: 16 BRPM | HEART RATE: 56 BPM | DIASTOLIC BLOOD PRESSURE: 70 MMHG | HEIGHT: 69 IN

## 2023-12-20 DIAGNOSIS — M47.816 LUMBAR SPONDYLOSIS: ICD-10-CM

## 2023-12-20 DIAGNOSIS — M51.16 LUMBAR DISC DISEASE WITH RADICULOPATHY: Primary | ICD-10-CM

## 2023-12-20 DIAGNOSIS — G89.4 CHRONIC PAIN SYNDROME: ICD-10-CM

## 2023-12-20 PROCEDURE — 99214 OFFICE O/P EST MOD 30 MIN: CPT | Performed by: ANESTHESIOLOGY

## 2023-12-20 RX ORDER — METHYLPREDNISOLONE 4 MG/1
TABLET ORAL
Qty: 21 TABLET | Refills: 0 | Status: SHIPPED | OUTPATIENT
Start: 2023-12-20

## 2023-12-20 NOTE — PROGRESS NOTES
Assessment:  No diagnosis found.    Plan:  Patient is a 58-year-old female with complaints of low back pain and bilateral leg pain presents to office for f/u visit.  He continues to have severe low back pain and bilateral hip pain.  Patient's pain pattern appears to be in the L2 and L3 nerve root distribution bilaterally exacerbated by standing or sitting for prolonged periods of time.  Patient denied any significant alleviation of symptoms with home exercise regimen provide at last office visit.  Patient continues to have severe throbbing, cramping, dull/aching pain.  Patient reports severe pain in the low back and legs when trying to sleep at nighttime.  X-ray of lumbar spine was reviewed which does show old compression fractures at L1 1 and L2 with decrease in vertebral body height there is also a noted anterior listhesis of L3 on L4.  X-rays of bilateral hips were within normal limits.  1.  We will order an MRI of the lumbar spine to better assess the discogenic pathology  Patient's low back pain and bilateral lower extremity radicular symptoms which appear to be in the L2 and L3 nerve root distribution  2.  We will order Medrol Dosepak for exacerbation of radicular symptoms  3.  Follow-up in 1 month to review MRI and plan interventional management      History of Present Illness:  The patient is a 58 y.o. female who presents for a follow up office visit in regards to Hip Pain (bilateral).   The patient’s current symptoms include 1 out of 10 intermittent dosaging, sharp, throbbing, cramping, pressure-like, numbness there particular type pattern.    Current pain medications includes: Diclofenac.  The patient reports that this regimen is providing 0% pain relief.  The patient is reporting itching from this pain medication regimen.    I have personally reviewed and/or updated the patient's past medical history, past surgical history, family history, social history, current medications, allergies, and vital signs  today.         Review of Systems  Review of Systems   Musculoskeletal:  Positive for gait problem.        Bilateral hip pain  Joint stiffness  Swelling     All other systems reviewed and are negative.          Past Medical History:   Diagnosis Date    Anesthesia     Pt states it takes her a long time to wake    Bipolar 1 disorder ([facility])     Disease of thyroid gland     Thyroid cancer ([facility])        Past Surgical History:   Procedure Laterality Date    BLADDER NECK RECONSTRUCTION      BLADDER SUSPENSION      HYSTERECTOMY      MAMMO STEREOTACTIC BREAST BIOPSY RIGHT (ALL INC) Right 2022    AL CORRECTION HAMMERTOE Left 2023    Procedure: REPAIR HAMMERTOE / MALLET TOE / CLAW TOE, L 4, 5 hammertoe repair.;  Surgeon: Miguel Angel Warren DPM;  Location: [facility];  Service: Podiatry    ROTATOR CUFF REPAIR Bilateral     done twice on each side    SHOULDER SURGERY      THYROIDECTOMY, PARTIAL         Family History   Problem Relation Age of Onset    No Known Problems Mother     No Known Problems Father     No Known Problems Sister     No Known Problems Sister     No Known Problems Sister     Throat cancer Maternal Grandfather     Breast cancer Maternal Aunt 60    Brain cancer Maternal Aunt     No Known Problems Paternal Aunt        Social History     Occupational History    Not on file   Tobacco Use    Smoking status: Former     Current packs/day: 0.00     Types: Cigarettes     Quit date:      Years since quittin.9    Smokeless tobacco: Never   Vaping Use    Vaping status: Never Used   Substance and Sexual Activity    Alcohol use: No    Drug use: No    Sexual activity: Yes         Current Outpatient Medications:     acetaminophen (TYLENOL) 500 mg tablet, Take 1,000 mg by mouth every 6 (six) hours as needed for mild pain, Disp: , Rfl:     diclofenac sodium (VOLTAREN) 50 mg EC tablet, Take 1 tablet (50 mg total) by mouth 2 (two) times a day, Disp: 60 tablet, Rfl: 1    lamoTRIgine (LaMICtal) 150 MG  "tablet, Take 150 mg by mouth daily, Disp: , Rfl:     Synthroid 100 MCG tablet, Take 1 tablet (100 mcg total) by mouth daily, Disp: 90 tablet, Rfl: 3    Allergies   Allergen Reactions    Demerol [Meperidine]     Latex Rash       Physical Exam:    /70   Pulse 56   Resp 16   Ht 5' 9\" (1.753 m)   Wt 98.2 kg (216 lb 6.4 oz)   BMI 31.96 kg/m²     Constitutional:normal, well developed, well nourished, alert, in no distress and non-toxic and no overt pain behavior. and obese  Eyes:anicteric  HEENT:grossly intact  Neck:supple, symmetric, trachea midline and no masses   Pulmonary:even and unlabored  Cardiovascular:No edema or pitting edema present  Skin:Normal without rashes or lesions and well hydrated  Psychiatric:Mood and affect appropriate  Neurologic:Cranial Nerves II-XII grossly intact  Musculoskeletal:antalgic    Imaging        Study Result    Narrative & Impression         BILATERAL HIPS AND PELVIS     INDICATION:   Pain in right hip. Pain in left hip.      COMPARISON: None available     VIEWS:  XR HIPS BILATERAL 2 VW W PELVIS IF PERFORMED  Images: 5     FINDINGS:  The bony pelvis appears intact.  Degenerative changes visualized lower lumbar spine and pubic symphysis.      LEFT HIP:  There is no acute fracture or dislocation.  No significant hip degenerative changes.  No lytic or blastic osseous lesion.  Soft tissues are unremarkable.     RIGHT HIP:  There is no acute fracture or dislocation.  No significant hip degenerative changes.  No lytic or blastic osseous lesion.  Soft tissues are unremarkable.     IMPRESSION:        No acute osseous abnormality.     Degenerative changes as described.     Electronically signed: 12/01/2023 11:05 AM Napoleon Wills MPH,MD         No orders to display       No orders of the defined types were placed in this encounter.     "

## 2023-12-20 NOTE — PATIENT INSTRUCTIONS
Core Strengthening Exercises   WHAT YOU NEED TO KNOW:   What do I need to know about core strengthening exercises?  Your core includes the muscles of your lower back, hip, pelvis, and abdomen. Core strengthening exercises help heal and strengthen these muscles. This helps prevent another injury, and keeps your pelvis, spine, and hips in the correct position.  What do I need to know about exercise safety?  Talk to your healthcare provider before you start an exercise program. A physical therapist can teach you how to do core strengthening exercises safely.  Do the exercises on a mat or firm surface.  A firm surface will support your spine and prevent low back pain. Do not do these exercises on a bed.    Move slowly and smoothly.  Avoid fast or jerky motions.    Stop if you feel pain.  You may feel some discomfort at first, but you should not feel pain. Tell your provider or physical therapist if you have pain while you exercise. Regular exercise will help decrease your discomfort over time.    Breathe normally during core exercises.  Do not hold your breath. This may cause an increase in blood pressure and prevent muscle strengthening. Your healthcare provider will tell you when to inhale and exhale during the exercise.    Begin all of your exercises with abdominal bracing.  Abdominal bracing helps warm up your core muscles. You can also practice abdominal bracing throughout the day. Lie on your back with your knees bent and feet flat on the floor. Place your arms in a relaxed position beside your body. Tighten your abdominal muscles. Pull your belly button in and up toward your spine. Hold for 5 seconds. Relax your muscles. Repeat 10 times.       How do I perform core strengthening exercises?  Your healthcare provider will tell you how often to do these exercises. The provider will also tell you how many repetitions of each exercise you should do. Hold each exercise for 5 seconds or as directed. As you get stronger,  increase your hold to 10 to 15 seconds. You can do some of these exercises on a stability ball, or with a weight. Ask your healthcare provider how to use a stability ball or weight for these exercises:  Bridging:  Lie on your back with your knees bent and feet flat on the floor. Rest your arms at your side. Tighten your buttocks, and then lift your hips 1 inch off the floor. Hold for 5 seconds. When you can do this exercise without pain for 10 seconds, increase the distance you lift your hips. A good goal is to be able to lift your hips so that your shoulders, hips, and knees are in a straight line.         Dead bug:  Lie on your back with your knees bent and feet flat on the floor. Place your arms in a relaxed position beside your body. Begin with abdominal bracing. Next, raise one leg, keeping your knee bent. Hold for 5 seconds. Repeat with the other leg. When you can do this exercise without pain for 10 to 15 seconds, you may raise one straight leg and hold. Repeat with the other leg.         Quadruped:  Place your hands and knees on the floor. Keep your wrists directly below your shoulders and your knees directly below your hips. Pull your belly button in toward your spine. Do not flatten or arch your back. Tighten your abdominal muscles below your belly button. Hold for 5 seconds. When you can do this exercise without pain for 10 to 15 seconds, you may extend one arm and hold. Repeat on the other side.         Side bridge exercises:      Standing side bridge:  Stand next to a wall and extend one arm toward the wall. Place your palm flat on the wall with your fingers pointing upward. Begin with abdominal bracing. Next, without moving your feet, slowly bend your arm to 90 degrees. Hold for 5 seconds. Repeat on the other side. When you can do this exercise without pain for 10 to 15 seconds, you may do the bent leg side bridge on the floor.         Bent leg side bridge:  Lie on one side with your legs, hips, and  shoulders in a straight line. Prop yourself up onto your forearm so your elbow is directly below your shoulder. Bend your knees back to 90 degrees. Begin with abdominal bracing. Next, lift your hips and balance yourself on your forearm and knees. Hold for 5 seconds. Repeat on the other side. When you can do this exercise without pain for 10 to 15 seconds, you may do the straight leg side bridge on the floor.         Straight leg side bridge:  Lie on one side with your legs, hips, and shoulders in a straight line. Prop yourself up onto your forearm so your elbow is directly below your shoulder. Begin with abdominal bracing. Lift your hips off the floor and balance yourself on your forearm and the outside of your flexed foot. Do not let your ankle bend sideways. Hold for 5 seconds. Repeat on the other side. When you can do this exercise without pain for 10 to 15 seconds, ask your healthcare provider for more advanced exercises.       Superman:  Lie on your stomach. Extend your arms forward on the floor. Tighten your abdominal muscles and lift your right hand and left leg off the floor. Hold this position. Slowly return to the starting position. Tighten your abdominal muscles and lift your left hand and right leg off the floor. Hold this position. Slowly return to the starting position.         Clam:  Lie on your side with your knees bent. Put your bottom arm under your head to keep your neck in line. Put your top hand on your hip to keep your pelvis from moving. Put your heels together, and keep them together during this exercise. Slowly raise your top knee toward the ceiling. Then lower your leg so your knees are together. Repeat this exercise 10 times. Then switch sides and do the exercise 10 times with the other leg.         Curl up:  Lie on your back with your knees bent and feet flat on the floor. Place your hands, palms down, underneath your lower back. Next, with your elbows on the floor, lift your shoulders  and chest 2 to 3 inches off the floor. Keep your head in line with your shoulders. Hold this position. Slowly return to the starting position.         Straight leg raises:  Lie on your back with one leg straight. Bend the other knee and place your foot flat on the floor. Tighten your abdominal muscles. Keep your leg straight and slowly lift it straight up 6 to 12 inches off the floor. Hold this position. Lower your leg slowly. Do as many repetitions as directed on this side. Repeat with the other leg.         Plank:  Lie on your stomach. Bend your elbows and place your forearms flat on the floor. Lift your chest, stomach, and knees off the floor. Make sure your elbows are below your shoulders. Your body should be in a straight line. Do not let your hips or lower back sink to the ground. Squeeze your abdominal muscles together and hold for 15 seconds. To make this exercise harder, hold for 30 seconds or lift 1 leg at a time.         Bicycles:  Lie on your back. Bend both knees and bring them toward your chest. Your calves should be parallel to the floor. Place the palms of your hands on the back of your head. Straighten your right leg and keep it lifted 2 inches off the floor. Raise your head and shoulders off the floor and twist towards your left. Keep your head and shoulders lifted. Bend your right knee while you straighten your left leg. Keep your left leg 2 inches off the floor. Twist your head and chest towards the left leg. Continue to straighten 1 leg at a time and twist.       When should I call my doctor or physical therapist?   You have sharp or worsening pain during exercise or at rest.    You have questions or concerns about your condition, care, or exercise program.    CARE AGREEMENT:   You have the right to help plan your care. Learn about your health condition and how it may be treated. Discuss treatment options with your healthcare providers to decide what care you want to receive. You always have the  right to refuse treatment. The above information is an  only. It is not intended as medical advice for individual conditions or treatments. Talk to your doctor, nurse or pharmacist before following any medical regimen to see if it is safe and effective for you.  © Copyright [employer] 2023 Information is for End User's use only and may not be sold, redistributed or otherwise used for commercial purposes.

## 2023-12-27 ENCOUNTER — HOSPITAL ENCOUNTER (OUTPATIENT)
Dept: MRI IMAGING | Facility: HOSPITAL | Age: 58
Discharge: HOME | End: 2023-12-27
Attending: ANESTHESIOLOGY
Payer: COMMERCIAL

## 2023-12-27 DIAGNOSIS — M51.16 LUMBAR DISC DISEASE WITH RADICULOPATHY: ICD-10-CM

## 2023-12-27 DIAGNOSIS — G89.4 CHRONIC PAIN SYNDROME: ICD-10-CM

## 2023-12-27 DIAGNOSIS — M47.816 LUMBAR SPONDYLOSIS: ICD-10-CM

## 2023-12-27 PROCEDURE — 72148 MRI LUMBAR SPINE W/O DYE: CPT

## 2023-12-27 PROCEDURE — G1004 CDSM NDSC: HCPCS

## 2024-01-17 NOTE — ED NOTES
Provider at bedside       Cresencio Perez RN  05/13/18 0780 No adenopathy or splenomegaly. No cervical or inguinal lymphadenopathy.

## 2024-01-22 ENCOUNTER — OFFICE VISIT (OUTPATIENT)
Dept: PAIN MEDICINE | Facility: CLINIC | Age: 59
End: 2024-01-22
Payer: COMMERCIAL

## 2024-01-22 VITALS
RESPIRATION RATE: 16 BRPM | DIASTOLIC BLOOD PRESSURE: 62 MMHG | HEIGHT: 69 IN | BODY MASS INDEX: 32.97 KG/M2 | SYSTOLIC BLOOD PRESSURE: 110 MMHG | WEIGHT: 222.6 LBS | HEART RATE: 62 BPM

## 2024-01-22 DIAGNOSIS — G89.4 CHRONIC PAIN SYNDROME: ICD-10-CM

## 2024-01-22 DIAGNOSIS — M51.16 LUMBAR DISC DISEASE WITH RADICULOPATHY: Primary | ICD-10-CM

## 2024-01-22 PROCEDURE — 99214 OFFICE O/P EST MOD 30 MIN: CPT | Performed by: ANESTHESIOLOGY

## 2024-01-22 NOTE — PATIENT INSTRUCTIONS
Cervical Thoracic & Lumbosacral Nerve Root Block / Transforaminal Epidural Steroid Injection    What is a nerve root and why is a selective nerve root or transformaminal epidural steroid injection helpful?  Nerve roots exit your spinal cord and form nerves that travel into your arms or legs. These nerves allow you to move your arms, chest wall and legs. Inflammation of these nerve roots may cause pain in your arms or legs. These nerve roots may become inflamed and painful due to irritation, for example, from a damaged disc or a bone spur. An injection at the level of the nerve root provides important information to your physician and may provide long term relief. It serves to prove which nerve is causing your pain by placing temporary numbing medicine over the nerve root of concern. If your main pain complaint improves after the injection, that nerve is most likely causing your pain. If your pain remains unchanged, that nerve probably is not the cause of your pain. By confirming or denying your exact source of pain, it provides information allowing for proper treatment, which may include limited surgery at a specific location.  The procedure serves both diagnostic and therapeutic purposes.    What happens during the procedure?  While lying on a table, the skin over your spine will be well cleaned. The physician will numb a small area of skin with numbing medicine which stings for a few seconds. Next, the physician will use X-ray guidance to direct a very small needle just next to the nerve root without injuring the nerve root. He will then inject contrast dye to confirm that the medicine flows around the nerve root. This may temporarily increase your usual pain. Then, anti-inflammatory steroid (with or without local anesthetic) will be injected along the nerve root to improve your pain, if that nerve is the source of your pain.    What happens after the procedure?  A dressing may be applied to the injection site.  You will remain in the office for about 15-20 minutes and the nurse will monitor your blood pressure and pulse. The nurse will review your discharge instructions and you will be able to go home. You may experience numbness or weakness to the affected limb for a few hours after the procedure. If this happens do not walk without assistance. Your physician may refer you to a physical therapist while the anti-inflammatory steroid is still working.    General Pre/Post Instructions  Eating:  You may eat a light, but not full meal at least one hour before the procedure, unless receiving intravenous sedation. If you are an insulin dependent diabetic do not alter your normal food intake.    Medications:  Take your routine medications before the procedure (such as high blood pressure and diabetes medications) except for those that need to be discontinued five days before the procedure such as aspirin and all anti-inflammatory medications (e.g. Motrin/Ibuprofen, Aleve, Relafen, Daypro). These medicines may be re-started the day after the procedure. You may take your regular pain medicine as needed before/after the procedure. If you are taking Coumadin, Heparin, Lovenox, Plavix or Ticlid you must notify the office so that the timing of stopping these medications can be explained.    Exercise: You must bring a  with you. You may return to your current level of activities the next day including return to work.    Things that may Delay the Procedure  If you are on antibiotics please notify our office; we may delay the procedure. If you have an active infection or fever we will not do the procedure.

## 2024-01-22 NOTE — PROGRESS NOTES
Assessment:  1. Lumbar disc disease with radiculopathy    2. Chronic pain syndrome        Plan:  Patient is a 58-year-old female complains of bilateral hip pain with left side worse than right presents to office for initial consultation.  Patient reports difficulty standing from sitting position, going up stairs, laying flat secondary to severe sharp, stabbing pain in the hips.  MRI lumbar spine from 12/27/2023 shows L2-L3 broad-based left foraminal and extraforaminal disc protrusion with advanced left foraminal narrowing, base left foraminal disc protrusion at L3-L4 with mild canal stenosis and left foraminal narrowing, L4-L5 moderate bilateral foraminal narrowing with mild mass effect upon both nerves.      1.  We will schedule patient for a left L2-L3 and L3-L4 transforaminal epidural steroid injection  2.  We will follow-up 1 month after injection      Complete risks and benefits including bleeding, infection, tissue reaction, nerve injury and allergic reaction were discussed. The approach was demonstrated using models and literature was provided. Verbal and written consent was obtained.      History of Present Illness:  The patient is a 58 y.o. female who presents for a follow up office visit in regards to Back Pain.   The patient’s current symptoms include constant burning, dosaging, sharp, throbbing, cramping, shooting pain without any particular type pattern.    Current pain medications includes: none     I have personally reviewed and/or updated the patient's past medical history, past surgical history, family history, social history, current medications, allergies, and vital signs today.         Review of Systems  Review of Systems   Musculoskeletal:  Positive for back pain and gait problem.   All other systems reviewed and are negative.          Past Medical History:   Diagnosis Date    Anesthesia     Pt states it takes her a long time to wake    Bipolar 1 disorder (HCC)     Disease of thyroid gland      Thyroid cancer (HCC)        Past Surgical History:   Procedure Laterality Date    BLADDER NECK RECONSTRUCTION      BLADDER SUSPENSION      HYSTERECTOMY      MAMMO STEREOTACTIC BREAST BIOPSY RIGHT (ALL INC) Right 2022    [place] CORRECTION HAMMERTOE Left 2023    Procedure: REPAIR HAMMERTOE / MALLET TOE / CLAW TOE, L 4, 5 hammertoe repair.;  Surgeon: Miguel Angel Warren DPM;  Location: [facility];  Service: Podiatry    ROTATOR CUFF REPAIR Bilateral     done twice on each side    SHOULDER SURGERY      THYROIDECTOMY, PARTIAL         Family History   Problem Relation Age of Onset    No Known Problems Mother     No Known Problems Father     No Known Problems Sister     No Known Problems Sister     No Known Problems Sister     Throat cancer Maternal Grandfather     Breast cancer Maternal Aunt 60    Brain cancer Maternal Aunt     No Known Problems Paternal Aunt        Social History     Occupational History    Not on file   Tobacco Use    Smoking status: Former     Current packs/day: 0.00     Types: Cigarettes     Quit date:      Years since quittin.0    Smokeless tobacco: Never   Vaping Use    Vaping status: Never Used   Substance and Sexual Activity    Alcohol use: No    Drug use: No    Sexual activity: Yes         Current Outpatient Medications:     acetaminophen (TYLENOL) 500 mg tablet, Take 1,000 mg by mouth every 6 (six) hours as needed for mild pain, Disp: , Rfl:     diclofenac sodium (VOLTAREN) 50 mg EC tablet, Take 1 tablet (50 mg total) by mouth 2 (two) times a day, Disp: 60 tablet, Rfl: 1    lamoTRIgine (LaMICtal) 150 MG tablet, Take 150 mg by mouth daily, Disp: , Rfl:     methylPREDNISolone 4 MG tablet therapy pack, Use as directed on package, Disp: 21 tablet, Rfl: 0    Synthroid 100 MCG tablet, Take 1 tablet (100 mcg total) by mouth daily, Disp: 90 tablet, Rfl: 3    Allergies   Allergen Reactions    Demerol [Meperidine]     Latex Rash       Physical Exam:    /62   Pulse 62   Resp  "16   Ht 5' 9\" (1.753 m)   Wt 101 kg (222 lb 9.6 oz)   BMI 32.87 kg/m²     Constitutional:normal, well developed, well nourished, alert, in no distress and non-toxic and no overt pain behavior. and obese  Eyes:anicteric  HEENT:grossly intact  Neck:supple, symmetric, trachea midline and no masses   Pulmonary:even and unlabored  Cardiovascular:No edema or pitting edema present  Skin:Normal without rashes or lesions and well hydrated  Psychiatric:Mood and affect appropriate  Neurologic:Cranial Nerves II-XII grossly intact  Musculoskeletal:antalgic    Lumbar/Sacral Spine examination demonstrates.  Decreased range of motion lumbar spine with pain upon: flexion, lateral rotation to the left/right, and bending to the left/right.  Bilateral lumbar paraspinals tender to palpation. Muscle spasms noted in the lumbar area bilaterally. 4/5 lower extremity strength in all muscle groups bilaterally. Positive seated straight leg raise for bilateral lower extremities.  Sensitivity to light touch intact bilateral lower extremities. 2+ reflexes in the patella and Achilles.  No ankle clonus    Imaging      Study Result    Narrative & Impression   MRI LUMBAR SPINE WITHOUT CONTRAST     INDICATION: M51.16: Intervertebral disc disorders with radiculopathy, lumbar region  G89.4: Chronic pain syndrome  M47.816: Spondylosis without myelopathy or radiculopathy, lumbar region.     COMPARISON:  None.     TECHNIQUE:  Multiplanar, multisequence imaging of the lumbar spine was performed. .        IMAGE QUALITY:  Diagnostic     FINDINGS:     VERTEBRAL BODIES: There are 5 lumbar type vertebral bodies and immediately below this there is a transitional vertebral body which is partially sacralized. This vertebral body will be labeled S1 for the purposes of this examination. Comparison to prior   chest x-ray and x-rays of the lumbar spine confirm there are 12 thoracic type vertebral bodies.     Mild dextroscoliosis of the thoracolumbar spine and mild " levoscoliosis of the mid to lower lumbar spine.     There is a hemangioma present within the T12 and L1 vertebral bodies as well as a smaller hemangioma within L4.     There is a mild chronic compression deformity of the anterior aspect of the S1 superior endplate.     Endplate marrow degenerative change, Modic type I at the L2-3 endplates.     SACRUM: Small hemangioma present within the anterior sacrum to the right of midline.     DISTAL CORD AND CONUS:  Normal size and signal within the distal cord and conus.     PARASPINAL SOFT TISSUES:  Paraspinal soft tissues are unremarkable.     LOWER THORACIC DISC SPACES:  Normal disc height and signal.  No disc herniation, canal stenosis or foraminal narrowing.     LUMBAR DISC SPACES:     L1-L2: Disc desiccation. Minor annular bulging without disc herniation, canal stenosis or foraminal narrowing.     L2-L3: Disc desiccation and loss of disc height. Annular bulging with a broad-based left foraminal and extraforaminal disc protrusion. Mild left-sided facet arthropathy. There is no canal stenosis. There is advanced left foraminal narrowing as a result   of this disc herniation with compression of the exiting nerve.     L3-L4: Disc desiccation and loss of disc height with diffuse annular bulging asymmetric towards the left where there is a broad-based left foraminal disc protrusion. Mild facet arthropathy. Mild canal stenosis and left-sided foraminal narrowing.     L4-L5: Slight anterior spondylolisthesis of L4 upon L5. Mild diffuse annular bulging. Mild to moderate facet arthropathy is present with mild to moderate canal stenosis and minimal distortion of the thecal sac. Moderate bilateral foraminal narrowing with   mild mass effect upon both exiting nerves.     L5-S1: Mild diffuse annular bulging and mild to moderate facet arthropathy. No disc herniation, canal stenosis or foraminal nerve impingement.     Small rudimentary disc present at the S1-S2 level as a result of the  incompletely sacralized S1 vertebral body.     OTHER FINDINGS:  None.     IMPRESSION:     Please note there are 12 paired ribs when compared with prior chest x-ray and x-rays of the lumbar spine. There are 5 lumbar type vertebral bodies and below this there is a transitional S1 vertebral body which is only partially sacralized. Vertebral   bodies are numbered on series 3, sagittal T2 weighted imaging for further reference.     Mild scoliosis. Multilevel thoracolumbar spondylitic degenerative change. There is a relatively large broad-based left foraminal disc herniation at the L2-3 level with clear compression and displacement of the exiting nerve. Correlate for left-sided   radiculopathy.     Additional degenerative change with mild to moderate canal stenosis and foraminal narrowing at the L3-4 level and at the L4-5 level.        Workstation performed: [id number]         No orders to display       No orders of the defined types were placed in this encounter.

## 2024-01-22 NOTE — H&P (VIEW-ONLY)
Assessment:  1. Lumbar disc disease with radiculopathy    2. Chronic pain syndrome        Plan:  Patient is a 58-year-old female complains of bilateral hip pain with left side worse than right presents to office for initial consultation.  Patient reports difficulty standing from sitting position, going up stairs, laying flat secondary to severe sharp, stabbing pain in the hips.  MRI lumbar spine from 12/27/2023 shows L2-L3 broad-based left foraminal and extraforaminal disc protrusion with advanced left foraminal narrowing, base left foraminal disc protrusion at L3-L4 with mild canal stenosis and left foraminal narrowing, L4-L5 moderate bilateral foraminal narrowing with mild mass effect upon both nerves.      1.  We will schedule patient for a left L2-L3 and L3-L4 transforaminal epidural steroid injection  2.  We will follow-up 1 month after injection      Complete risks and benefits including bleeding, infection, tissue reaction, nerve injury and allergic reaction were discussed. The approach was demonstrated using models and literature was provided. Verbal and written consent was obtained.      History of Present Illness:  The patient is a 58 y.o. female who presents for a follow up office visit in regards to Back Pain.   The patient’s current symptoms include constant burning, dosaging, sharp, throbbing, cramping, shooting pain without any particular type pattern.    Current pain medications includes: none     I have personally reviewed and/or updated the patient's past medical history, past surgical history, family history, social history, current medications, allergies, and vital signs today.         Review of Systems  Review of Systems   Musculoskeletal:  Positive for back pain and gait problem.   All other systems reviewed and are negative.          Past Medical History:   Diagnosis Date    Anesthesia     Pt states it takes her a long time to wake    Bipolar 1 disorder (HCC)     Disease of thyroid gland      Thyroid cancer (HCC)        Past Surgical History:   Procedure Laterality Date    BLADDER NECK RECONSTRUCTION      BLADDER SUSPENSION      HYSTERECTOMY      MAMMO STEREOTACTIC BREAST BIOPSY RIGHT (ALL INC) Right 2022    ME CORRECTION HAMMERTOE Left 2023    Procedure: REPAIR HAMMERTOE / MALLET TOE / CLAW TOE, L 4, 5 hammertoe repair.;  Surgeon: Miguel Angel Warren DPM;  Location: [facility];  Service: Podiatry    ROTATOR CUFF REPAIR Bilateral     done twice on each side    SHOULDER SURGERY      THYROIDECTOMY, PARTIAL         Family History   Problem Relation Age of Onset    No Known Problems Mother     No Known Problems Father     No Known Problems Sister     No Known Problems Sister     No Known Problems Sister     Throat cancer Maternal Grandfather     Breast cancer Maternal Aunt 60    Brain cancer Maternal Aunt     No Known Problems Paternal Aunt        Social History     Occupational History    Not on file   Tobacco Use    Smoking status: Former     Current packs/day: 0.00     Types: Cigarettes     Quit date:      Years since quittin.0    Smokeless tobacco: Never   Vaping Use    Vaping status: Never Used   Substance and Sexual Activity    Alcohol use: No    Drug use: No    Sexual activity: Yes         Current Outpatient Medications:     acetaminophen (TYLENOL) 500 mg tablet, Take 1,000 mg by mouth every 6 (six) hours as needed for mild pain, Disp: , Rfl:     diclofenac sodium (VOLTAREN) 50 mg EC tablet, Take 1 tablet (50 mg total) by mouth 2 (two) times a day, Disp: 60 tablet, Rfl: 1    lamoTRIgine (LaMICtal) 150 MG tablet, Take 150 mg by mouth daily, Disp: , Rfl:     methylPREDNISolone 4 MG tablet therapy pack, Use as directed on package, Disp: 21 tablet, Rfl: 0    Synthroid 100 MCG tablet, Take 1 tablet (100 mcg total) by mouth daily, Disp: 90 tablet, Rfl: 3    Allergies   Allergen Reactions    Demerol [Meperidine]     Latex Rash       Physical Exam:    /62   Pulse 62   Resp  "16   Ht 5' 9\" (1.753 m)   Wt 101 kg (222 lb 9.6 oz)   BMI 32.87 kg/m²     Constitutional:normal, well developed, well nourished, alert, in no distress and non-toxic and no overt pain behavior. and obese  Eyes:anicteric  HEENT:grossly intact  Neck:supple, symmetric, trachea midline and no masses   Pulmonary:even and unlabored  Cardiovascular:No edema or pitting edema present  Skin:Normal without rashes or lesions and well hydrated  Psychiatric:Mood and affect appropriate  Neurologic:Cranial Nerves II-XII grossly intact  Musculoskeletal:antalgic    Lumbar/Sacral Spine examination demonstrates.  Decreased range of motion lumbar spine with pain upon: flexion, lateral rotation to the left/right, and bending to the left/right.  Bilateral lumbar paraspinals tender to palpation. Muscle spasms noted in the lumbar area bilaterally. 4/5 lower extremity strength in all muscle groups bilaterally. Positive seated straight leg raise for bilateral lower extremities.  Sensitivity to light touch intact bilateral lower extremities. 2+ reflexes in the patella and Achilles.  No ankle clonus    Imaging      Study Result    Narrative & Impression   MRI LUMBAR SPINE WITHOUT CONTRAST     INDICATION: M51.16: Intervertebral disc disorders with radiculopathy, lumbar region  G89.4: Chronic pain syndrome  M47.816: Spondylosis without myelopathy or radiculopathy, lumbar region.     COMPARISON:  None.     TECHNIQUE:  Multiplanar, multisequence imaging of the lumbar spine was performed. .        IMAGE QUALITY:  Diagnostic     FINDINGS:     VERTEBRAL BODIES: There are 5 lumbar type vertebral bodies and immediately below this there is a transitional vertebral body which is partially sacralized. This vertebral body will be labeled S1 for the purposes of this examination. Comparison to prior   chest x-ray and x-rays of the lumbar spine confirm there are 12 thoracic type vertebral bodies.     Mild dextroscoliosis of the thoracolumbar spine and mild " levoscoliosis of the mid to lower lumbar spine.     There is a hemangioma present within the T12 and L1 vertebral bodies as well as a smaller hemangioma within L4.     There is a mild chronic compression deformity of the anterior aspect of the S1 superior endplate.     Endplate marrow degenerative change, Modic type I at the L2-3 endplates.     SACRUM: Small hemangioma present within the anterior sacrum to the right of midline.     DISTAL CORD AND CONUS:  Normal size and signal within the distal cord and conus.     PARASPINAL SOFT TISSUES:  Paraspinal soft tissues are unremarkable.     LOWER THORACIC DISC SPACES:  Normal disc height and signal.  No disc herniation, canal stenosis or foraminal narrowing.     LUMBAR DISC SPACES:     L1-L2: Disc desiccation. Minor annular bulging without disc herniation, canal stenosis or foraminal narrowing.     L2-L3: Disc desiccation and loss of disc height. Annular bulging with a broad-based left foraminal and extraforaminal disc protrusion. Mild left-sided facet arthropathy. There is no canal stenosis. There is advanced left foraminal narrowing as a result   of this disc herniation with compression of the exiting nerve.     L3-L4: Disc desiccation and loss of disc height with diffuse annular bulging asymmetric towards the left where there is a broad-based left foraminal disc protrusion. Mild facet arthropathy. Mild canal stenosis and left-sided foraminal narrowing.     L4-L5: Slight anterior spondylolisthesis of L4 upon L5. Mild diffuse annular bulging. Mild to moderate facet arthropathy is present with mild to moderate canal stenosis and minimal distortion of the thecal sac. Moderate bilateral foraminal narrowing with   mild mass effect upon both exiting nerves.     L5-S1: Mild diffuse annular bulging and mild to moderate facet arthropathy. No disc herniation, canal stenosis or foraminal nerve impingement.     Small rudimentary disc present at the S1-S2 level as a result of the  incompletely sacralized S1 vertebral body.     OTHER FINDINGS:  None.     IMPRESSION:     Please note there are 12 paired ribs when compared with prior chest x-ray and x-rays of the lumbar spine. There are 5 lumbar type vertebral bodies and below this there is a transitional S1 vertebral body which is only partially sacralized. Vertebral   bodies are numbered on series 3, sagittal T2 weighted imaging for further reference.     Mild scoliosis. Multilevel thoracolumbar spondylitic degenerative change. There is a relatively large broad-based left foraminal disc herniation at the L2-3 level with clear compression and displacement of the exiting nerve. Correlate for left-sided   radiculopathy.     Additional degenerative change with mild to moderate canal stenosis and foraminal narrowing at the L3-4 level and at the L4-5 level.        Workstation performed: [id number]         No orders to display       No orders of the defined types were placed in this encounter.

## 2024-02-20 ENCOUNTER — APPOINTMENT (OUTPATIENT)
Dept: RADIOLOGY | Facility: HOSPITAL | Age: 59
End: 2024-02-20
Payer: COMMERCIAL

## 2024-02-20 ENCOUNTER — HOSPITAL ENCOUNTER (OUTPATIENT)
Facility: HOSPITAL | Age: 59
Setting detail: OUTPATIENT SURGERY
Discharge: HOME | End: 2024-02-20
Attending: ANESTHESIOLOGY | Admitting: ANESTHESIOLOGY
Payer: COMMERCIAL

## 2024-02-20 VITALS
TEMPERATURE: 97 F | OXYGEN SATURATION: 100 % | RESPIRATION RATE: 20 BRPM | BODY MASS INDEX: 32.88 KG/M2 | HEART RATE: 58 BPM | WEIGHT: 222 LBS | SYSTOLIC BLOOD PRESSURE: 133 MMHG | DIASTOLIC BLOOD PRESSURE: 62 MMHG | HEIGHT: 69 IN

## 2024-02-20 DIAGNOSIS — M47.816 LUMBAR SPONDYLOSIS: Primary | ICD-10-CM

## 2024-02-20 PROCEDURE — 64484 NJX AA&/STRD TFRM EPI L/S EA: CPT | Performed by: ANESTHESIOLOGY

## 2024-02-20 PROCEDURE — 64483 NJX AA&/STRD TFRM EPI L/S 1: CPT | Performed by: ANESTHESIOLOGY

## 2024-02-20 RX ORDER — DEXAMETHASONE SODIUM PHOSPHATE 10 MG/ML
INJECTION, SOLUTION INTRAMUSCULAR; INTRAVENOUS AS NEEDED
Status: DISCONTINUED | OUTPATIENT
Start: 2024-02-20 | End: 2024-02-20 | Stop reason: HOSPADM

## 2024-02-20 RX ORDER — LIDOCAINE HYDROCHLORIDE 10 MG/ML
INJECTION, SOLUTION EPIDURAL; INFILTRATION; INTRACAUDAL; PERINEURAL AS NEEDED
Status: DISCONTINUED | OUTPATIENT
Start: 2024-02-20 | End: 2024-02-20 | Stop reason: HOSPADM

## 2024-02-20 NOTE — DISCHARGE INSTR - AVS FIRST PAGE
Epidural Steroid Injection   WHAT YOU NEED TO KNOW:   An epidural steroid injection (REID) is a procedure to inject steroid medicine into the epidural space. The epidural space is between your spinal cord and vertebrae. Steroids reduce inflammation and fluid buildup in your spine that may be causing pain. You may be given pain medicine along with the steroids.          ACTIVITY  Do not drive or operate machinery today.  No strenuous activity today - bending, lifting, etc.  You may resume normal activites starting tomorrow - start slowly and as tolerated.  You may shower today, but no tub baths or hot tubs.  You may have numbness for several hours from the local anesthetic. Please use caution and common sense, especially with weight-bearing activities.    CARE OF THE INJECTION SITE  If you have soreness or pain, apply ice to the area today (20 minutes on/20 minutes off).  Starting tomorrow, you may use warm, moist heat or ice if needed.  You may have an increase or change in your discomfort for 36-48 hours after your treatment.  Apply ice and continue with any pain medication you have been prescribed.  Notify the [facility] if you have any of the following: redness, drainage, swelling, headache, stiff neck or fever above 100°F.    SPECIAL INSTRUCTIONS  Our office will contact you in approximately 7 days for a progress report.    MEDICATIONS  Continue to take all routine medications.  Our office may have instructed you to hold some medications.    As no general anesthesia was used in today's procedure, you should not experience any side effects related to anesthesia.     If you are diabetic, the steroids used in today's injection may temporarily increase your blood sugar levels after the first few days after your injection. Please keep a close eye on your sugars and alert the doctor who manages your diabetes if your sugars are significantly high from your baseline or you are symptomatic.     If you have a  problem specifically related to your procedure, please call our office at [phone].  Problems not related to your procedure should be directed to your primary care physician.

## 2024-02-20 NOTE — INTERVAL H&P NOTE
H&P reviewed. After examining the patient I find no changes in the patients condition since the H&P had been written.    Vitals:    02/20/24 0941   BP: 128/62   Pulse: 57   Resp: 18   Temp: 97.9 °F (36.6 °C)   SpO2: 98%

## 2024-02-20 NOTE — OP NOTE
OPERATIVE REPORT  PATIENT NAME: Bree Millan    :  1965  MRN: [id number]  Pt Location: [facility] ROOM 01    SURGERY DATE: 2024    Surgeons and Role:     * Alexandra Garcia MD - Primary    Preop Diagnosis:  Lumbar disc disease with radiculopathy [M51.16]  Chronic pain syndrome [G89.4]    Post-Op Diagnosis Codes:     * Lumbar disc disease with radiculopathy [M51.16]     * Chronic pain syndrome [G89.4]    Procedure(s):  Left - Left L2-3 and L3-4 transforaminal dural steroid injection    Specimen(s):  * No specimens in log *    Estimated Blood Loss:   Minimal    Drains:  * No LDAs found *    Anesthesia Type:   Local    Operative Indications:  Lumbar disc disease with radiculopathy [M51.16]  Chronic pain syndrome [G89.4]      Operative Findings:  same    Complications:   None    Procedure and Technique:  Fluoroscopically-guided left L2-L3 and L3-L4 transforaminal epidural steroid injection under fluoroscopy      After discussing the risks, benefits, and alternatives to the procedure, the patient expressed understanding and wished to proceed.  The patient was brought to the fluoroscopy suite and placed in the prone position.  A procedural pause was conducted to verify:  correct patient identity, procedure to be performed and as applicable, correct side and site, correct patient position, and availability of implants, special equipment and special requirements.  After identifying the left L2 and L3 pedicles fluoroscopically with an oblique view, the skin was sterilely prepped and draped in the usual fashion using Chloraprep skin prep.  The skin and subcutaneous tissue were anesthetized with 0.5% lidocaine.  A 5 inch 22 gauge spinal needle was then advanced under fluoroscopic guidance to the posterior aspect of the left L2-L3 and L3-L4 neural foramens.  Appropriate foraminal depth was determined with a lateral fluoroscopic view, and AP visualization confirmed needle positioning at approximately the 6  o’clock position relative to the pedicles.  After negative aspiration, 1 mL of Omnipaque 300 contrast was injected using live fluoroscopy/digital subtraction angiography, confirming appropriate transforaminal spread without evidence of intravascular or intrathecal uptake.   Next, a local anesthetic test dose consisting of 1 mL of 2% lidocaine was injected through the needle at each level.  After an appropriate period of observation, a directed neurological exam was performed which revealed no new neurologic deficits.  Next, a 1.5 ml solution consisting of 7.5 mg of dexamethasone in sterile saline was injected slowly and incrementally into the epidural space at each level.  Following the injection the needles were withdrawn slightly and flushed with lidocaine as they were fully extracted.  The patient tolerated the procedure well and there were no apparent complications.  The patient did not develop any new neurologic deficits.  After appropriate observation, the patient was dismissed from the clinic in good condition under their own power.    COMMENTS   The patient received a total steroid dose of 15 mg of dexamethasone.    I was present for the entire procedure.    Patient Disposition:  hemodynamically stable        SIGNATURE: Alexandra Garcia MD  DATE: February 20, 2024  TIME: 10:10 AM

## 2024-02-21 PROBLEM — Z12.11 SCREENING FOR COLON CANCER: Status: RESOLVED | Noted: 2019-08-19 | Resolved: 2024-02-21

## 2024-02-21 PROBLEM — Z12.4 SCREENING FOR CERVICAL CANCER: Status: RESOLVED | Noted: 2019-08-19 | Resolved: 2024-02-21

## 2024-02-22 ENCOUNTER — TELEPHONE (OUTPATIENT)
Dept: INTERNAL MEDICINE CLINIC | Facility: CLINIC | Age: 59
End: 2024-02-22

## 2024-02-22 NOTE — TELEPHONE ENCOUNTER
Hospitalist Patient had to cancel and reschedule her EMG due to her hip. The next available appointment is after her script expires. They are requesting that we put a new prescription into epic so they can schedule patient.    Can you reorder the EMG?    Pulmonology

## 2024-02-27 ENCOUNTER — TELEPHONE (OUTPATIENT)
Dept: PAIN MEDICINE | Facility: CLINIC | Age: 59
End: 2024-02-27

## 2024-02-27 NOTE — TELEPHONE ENCOUNTER
Patient reports 30-40% improvement post inj  Pain level 5/10  Patient would like to know when she would be eligible for second round for injections.

## 2024-02-29 ENCOUNTER — TELEPHONE (OUTPATIENT)
Dept: INTERNAL MEDICINE CLINIC | Facility: CLINIC | Age: 59
End: 2024-02-29

## 2024-02-29 NOTE — TELEPHONE ENCOUNTER
Patient called asking for a new referral for the emg on her arms because the that is in there will  until she gets in for the test . ( Previous note ).

## 2024-03-01 DIAGNOSIS — R20.2 NUMBNESS AND TINGLING IN BOTH HANDS: Primary | ICD-10-CM

## 2024-03-01 DIAGNOSIS — R20.0 NUMBNESS AND TINGLING IN BOTH HANDS: Primary | ICD-10-CM

## 2024-03-19 ENCOUNTER — OFFICE VISIT (OUTPATIENT)
Dept: PAIN MEDICINE | Facility: CLINIC | Age: 59
End: 2024-03-19
Payer: COMMERCIAL

## 2024-03-19 VITALS
RESPIRATION RATE: 16 BRPM | WEIGHT: 222 LBS | DIASTOLIC BLOOD PRESSURE: 72 MMHG | BODY MASS INDEX: 32.88 KG/M2 | HEART RATE: 65 BPM | SYSTOLIC BLOOD PRESSURE: 116 MMHG | HEIGHT: 69 IN

## 2024-03-19 DIAGNOSIS — M51.16 LUMBAR DISC DISEASE WITH RADICULOPATHY: ICD-10-CM

## 2024-03-19 DIAGNOSIS — M47.816 LUMBAR SPONDYLOSIS: ICD-10-CM

## 2024-03-19 DIAGNOSIS — G89.4 CHRONIC PAIN SYNDROME: Primary | ICD-10-CM

## 2024-03-19 DIAGNOSIS — M46.1 SACROILIITIS (HCC): ICD-10-CM

## 2024-03-19 PROCEDURE — 99213 OFFICE O/P EST LOW 20 MIN: CPT | Performed by: NURSE PRACTITIONER

## 2024-03-19 NOTE — PROGRESS NOTES
Assessment:  1. Chronic pain syndrome    2. Lumbar spondylosis    3. Lumbar disc disease with radiculopathy    4. Sacroiliitis (HCC)        Plan:  While the patient was in the office today, I did have a thorough conversation regarding their chronic pain syndrome, medication management, and treatment plan options.  Patient is being seen for a follow-up visit.  She underwent a left-sided L2-3 and L3-4 transforaminal epidural steroid injection on 2/20/2024.  Overall, she is reporting about 50% improvement in her pain, especially the left leg pain.  Her biggest complaint today is low back pain that radiates to bilateral buttocks.  On exam, she demonstrates findings consistent with sacroiliitis.    Start physical therapy for lumbar core strengthening/stretching.    Will consider diagnostic/therapeutic sacroiliac joint injections in the future if needed.    Follow-up in 2 months.        History of Present Illness:  The patient is a 59 y.o. female who presents for a follow up office visit in regards to Back Pain.   The patient’s current symptoms include complaints of low back pain that radiates to bilateral buttocks.  Current pain level is a 3/10.  Quality pain is described as burning, dull, aching, sharp, shooting.    Current pain medications includes: Over-the-counter Tylenol if needed.     I have personally reviewed and/or updated the patient's past medical history, past surgical history, family history, social history, current medications, allergies, and vital signs today.         Review of Systems  Review of Systems   Musculoskeletal:  Positive for back pain and gait problem.        Joint stiffness  pain   All other systems reviewed and are negative.          Past Medical History:   Diagnosis Date    Anesthesia     Pt states it takes her a long time to wake    Bipolar 1 disorder (HCC)     Disease of thyroid gland     Thyroid cancer (HCC) 1987       Past Surgical History:   Procedure Laterality Date    BLADDER NECK  "RECONSTRUCTION      BLADDER SUSPENSION      EPIDURAL BLOCK INJECTION Left 2024    Procedure: Left L2-3 and L3-4 transforaminal dural steroid injection;  Surgeon: Alexandra Garcia MD;  Location: [facility];  Service: Pain Management     HYSTERECTOMY      MAMMO STEREOTACTIC BREAST BIOPSY RIGHT (ALL INC) Right 2022    NM CORRECTION HAMMERTOE Left 2023    Procedure: REPAIR HAMMERTOE / MALLET TOE / CLAW TOE, L 4, 5 hammertoe repair.;  Surgeon: Miguel Angel Warren DPM;  Location: [facility];  Service: Podiatry    ROTATOR CUFF REPAIR Bilateral     done twice on each side    SHOULDER SURGERY      THYROIDECTOMY, PARTIAL         Family History   Problem Relation Age of Onset    No Known Problems Mother     No Known Problems Father     No Known Problems Sister     No Known Problems Sister     No Known Problems Sister     Throat cancer Maternal Grandfather     Breast cancer Maternal Aunt 60    Brain cancer Maternal Aunt     No Known Problems Paternal Aunt        Social History     Occupational History    Not on file   Tobacco Use    Smoking status: Former     Current packs/day: 0.00     Types: Cigarettes     Quit date:      Years since quittin.2    Smokeless tobacco: Never   Vaping Use    Vaping status: Never Used   Substance and Sexual Activity    Alcohol use: No    Drug use: No    Sexual activity: Yes         Current Outpatient Medications:     acetaminophen (TYLENOL) 500 mg tablet, Take 1,000 mg by mouth every 6 (six) hours as needed for mild pain, Disp: , Rfl:     Synthroid 100 MCG tablet, Take 1 tablet (100 mcg total) by mouth daily, Disp: 90 tablet, Rfl: 3    Allergies   Allergen Reactions    Demerol [Meperidine]     Latex Rash       Physical Exam:    /72   Pulse 65   Resp 16   Ht 5' 9\" (1.753 m)   Wt 101 kg (222 lb)   BMI 32.78 kg/m²     Constitutional:normal, well developed, well nourished, alert, in no distress and non-toxic and no overt pain behavior. and " overweight  Eyes:anicteric  HEENT:grossly intact  Neck:supple, symmetric, trachea midline and no masses   Pulmonary:even and unlabored  Cardiovascular:No edema or pitting edema present  Skin:Normal without rashes or lesions and well hydrated  Psychiatric:Mood and affect appropriate  Neurologic:Cranial Nerves II-XII grossly intact  Musculoskeletal: Tenderness over bilateral SI joints.    Imaging      Study Result    Narrative & Impression   MRI LUMBAR SPINE WITHOUT CONTRAST     INDICATION: M51.16: Intervertebral disc disorders with radiculopathy, lumbar region  G89.4: Chronic pain syndrome  M47.816: Spondylosis without myelopathy or radiculopathy, lumbar region.     COMPARISON:  None.     TECHNIQUE:  Multiplanar, multisequence imaging of the lumbar spine was performed. .        IMAGE QUALITY:  Diagnostic     FINDINGS:     VERTEBRAL BODIES: There are 5 lumbar type vertebral bodies and immediately below this there is a transitional vertebral body which is partially sacralized. This vertebral body will be labeled S1 for the purposes of this examination. Comparison to prior   chest x-ray and x-rays of the lumbar spine confirm there are 12 thoracic type vertebral bodies.     Mild dextroscoliosis of the thoracolumbar spine and mild levoscoliosis of the mid to lower lumbar spine.     There is a hemangioma present within the T12 and L1 vertebral bodies as well as a smaller hemangioma within L4.     There is a mild chronic compression deformity of the anterior aspect of the S1 superior endplate.     Endplate marrow degenerative change, Modic type I at the L2-3 endplates.     SACRUM: Small hemangioma present within the anterior sacrum to the right of midline.     DISTAL CORD AND CONUS:  Normal size and signal within the distal cord and conus.     PARASPINAL SOFT TISSUES:  Paraspinal soft tissues are unremarkable.     LOWER THORACIC DISC SPACES:  Normal disc height and signal.  No disc herniation, canal stenosis or foraminal  narrowing.     LUMBAR DISC SPACES:     L1-L2: Disc desiccation. Minor annular bulging without disc herniation, canal stenosis or foraminal narrowing.     L2-L3: Disc desiccation and loss of disc height. Annular bulging with a broad-based left foraminal and extraforaminal disc protrusion. Mild left-sided facet arthropathy. There is no canal stenosis. There is advanced left foraminal narrowing as a result   of this disc herniation with compression of the exiting nerve.     L3-L4: Disc desiccation and loss of disc height with diffuse annular bulging asymmetric towards the left where there is a broad-based left foraminal disc protrusion. Mild facet arthropathy. Mild canal stenosis and left-sided foraminal narrowing.     L4-L5: Slight anterior spondylolisthesis of L4 upon L5. Mild diffuse annular bulging. Mild to moderate facet arthropathy is present with mild to moderate canal stenosis and minimal distortion of the thecal sac. Moderate bilateral foraminal narrowing with   mild mass effect upon both exiting nerves.     L5-S1: Mild diffuse annular bulging and mild to moderate facet arthropathy. No disc herniation, canal stenosis or foraminal nerve impingement.     Small rudimentary disc present at the S1-S2 level as a result of the incompletely sacralized S1 vertebral body.     OTHER FINDINGS:  None.     IMPRESSION:     Please note there are 12 paired ribs when compared with prior chest x-ray and x-rays of the lumbar spine. There are 5 lumbar type vertebral bodies and below this there is a transitional S1 vertebral body which is only partially sacralized. Vertebral   bodies are numbered on series 3, sagittal T2 weighted imaging for further reference.     Mild scoliosis. Multilevel thoracolumbar spondylitic degenerative change. There is a relatively large broad-based left foraminal disc herniation at the L2-3 level with clear compression and displacement of the exiting nerve. Correlate for left-sided   radiculopathy.      Additional degenerative change with mild to moderate canal stenosis and foraminal narrowing at the L3-4 level and at the L4-5 level.        Workstation performed: [id number]           No orders to display       Orders Placed This Encounter   Procedures    Ambulatory Referral to Physical Therapy

## 2024-04-03 ENCOUNTER — EVALUATION (OUTPATIENT)
Dept: PHYSICAL THERAPY | Facility: HOME HEALTHCARE | Age: 59
End: 2024-04-03
Payer: COMMERCIAL

## 2024-04-03 DIAGNOSIS — M47.816 LUMBAR SPONDYLOSIS: ICD-10-CM

## 2024-04-03 DIAGNOSIS — M46.1 SACROILIITIS (HCC): ICD-10-CM

## 2024-04-03 DIAGNOSIS — G89.4 CHRONIC PAIN SYNDROME: ICD-10-CM

## 2024-04-03 DIAGNOSIS — M51.16 LUMBAR DISC DISEASE WITH RADICULOPATHY: ICD-10-CM

## 2024-04-03 PROCEDURE — 97162 PT EVAL MOD COMPLEX 30 MIN: CPT

## 2024-04-03 PROCEDURE — 97140 MANUAL THERAPY 1/> REGIONS: CPT

## 2024-04-03 PROCEDURE — 97110 THERAPEUTIC EXERCISES: CPT
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
## 2024-04-09 ENCOUNTER — APPOINTMENT (OUTPATIENT)
Dept: PHYSICAL THERAPY | Facility: HOME HEALTHCARE | Age: 59
End: 2024-04-09
Payer: COMMERCIAL

## 2024-04-09 ENCOUNTER — HOSPITAL ENCOUNTER (OUTPATIENT)
Dept: NEUROLOGY | Facility: CLINIC | Age: 59
Discharge: HOME | End: 2024-04-09
Payer: COMMERCIAL

## 2024-04-09 DIAGNOSIS — R20.0 NUMBNESS AND TINGLING IN BOTH HANDS: ICD-10-CM

## 2024-04-09 DIAGNOSIS — R20.2 NUMBNESS AND TINGLING IN BOTH HANDS: ICD-10-CM

## 2024-04-09 PROBLEM — G56.03 CARPAL TUNNEL SYNDROME, BILATERAL: Status: ACTIVE | Noted: 2024-04-09

## 2024-04-09 PROCEDURE — 95911 NRV CNDJ TEST 9-10 STUDIES: CPT | Performed by: PSYCHIATRY & NEUROLOGY

## 2024-04-09 PROCEDURE — 95886 MUSC TEST DONE W/N TEST COMP: CPT | Performed by: PSYCHIATRY & NEUROLOGY

## 2024-04-10 ENCOUNTER — OFFICE VISIT (OUTPATIENT)
Dept: PHYSICAL THERAPY | Facility: HOME HEALTHCARE | Age: 59
End: 2024-04-10
Payer: COMMERCIAL

## 2024-04-10 DIAGNOSIS — M47.816 LUMBAR SPONDYLOSIS: ICD-10-CM

## 2024-04-10 DIAGNOSIS — G56.03 BILATERAL CARPAL TUNNEL SYNDROME: Primary | ICD-10-CM

## 2024-04-10 DIAGNOSIS — M46.1 SACROILIITIS (HCC): ICD-10-CM

## 2024-04-10 DIAGNOSIS — G89.4 CHRONIC PAIN SYNDROME: Primary | ICD-10-CM

## 2024-04-10 DIAGNOSIS — M51.16 LUMBAR DISC DISEASE WITH RADICULOPATHY: ICD-10-CM

## 2024-04-10 PROCEDURE — 97110 THERAPEUTIC EXERCISES: CPT

## 2024-04-10 PROCEDURE — 97140 MANUAL THERAPY 1/> REGIONS: CPT

## 2024-04-10 PROCEDURE — 97112 NEUROMUSCULAR REEDUCATION: CPT

## 2024-04-10 NOTE — PROGRESS NOTES
"Daily Note     Today's date: 4/10/2024  Patient name: Bree Millan  : 1965  MRN: [id number]  Referring provider: Kocher, Barbara, CRNP  Dx:   Encounter Diagnosis     ICD-10-CM    1. Chronic pain syndrome  G89.4       2. Lumbar spondylosis  M47.816       3. Lumbar disc disease with radiculopathy  M51.16       4. Sacroiliitis (HCC)  M46.1           Start Time: 702  Stop Time: 748  Total time in clinic (min): 46 minutes    Subjective: Pt reports that today is a pain day, but it is because she had to do a lot of driving yesterday.      Objective: See treatment diary below      Assessment: Pt demonstrated good tolerance to treatment session and was able to progress with core and hip strengthening.  She did require some verbal and tactile cues to avoid trunk rotation compensations with clamshells and sidelying hip abduction, but was able to perform correctly once cued.  The pt also responded well to L glute medius STM and had an improvement in symptoms following treatment session.      Plan: Continue per plan of care.      Precautions: Latex allergy    Re-eval Date: 2024      Manuals 4/3 4/10           Lumbar P-A mobilizations L1-S1 Gr III 1x10 ea            Neto sidelying hip flexor stretch JAVIER HERRERA           STM L glute medius JA JAVIER                        Neuro Re-Ed             TrA activation             PPT HEP 5\" 2x10           PPT w/ marches  1x10            PPT w/ SLR  1x5 neto *pain on left           Palloff press                                       Ther Ex             Nustep  L2 5'           Supine hip flexor stretch HEP            LTR             DKTC             Standing lumbar ext  5\"x10           Seated lumbar flex w/ p-ball  5\"x10 flex           Bridges HEP 5\" 1x10           SLR             Clamshells HEP 1x10, 1x5 L, 2x10 R            S/L hip abd HEP 2x10 neto           Prone hip ext             Squat  1x10           Leg press             LF hip abd                          Ther Activity   "                                     Gait Training                                       Modalities             [facility]

## 2024-04-16 ENCOUNTER — OFFICE VISIT (OUTPATIENT)
Dept: PHYSICAL THERAPY | Facility: HOME HEALTHCARE | Age: 59
End: 2024-04-16
Payer: COMMERCIAL

## 2024-04-16 DIAGNOSIS — G89.4 CHRONIC PAIN SYNDROME: Primary | ICD-10-CM

## 2024-04-16 PROCEDURE — 97110 THERAPEUTIC EXERCISES: CPT

## 2024-04-16 PROCEDURE — 97112 NEUROMUSCULAR REEDUCATION: CPT

## 2024-04-16 PROCEDURE — 97140 MANUAL THERAPY 1/> REGIONS: CPT

## 2024-04-18 ENCOUNTER — OFFICE VISIT (OUTPATIENT)
Dept: PHYSICAL THERAPY | Facility: HOME HEALTHCARE | Age: 59
End: 2024-04-18
Payer: COMMERCIAL

## 2024-04-18 DIAGNOSIS — G89.4 CHRONIC PAIN SYNDROME: Primary | ICD-10-CM

## 2024-04-18 PROCEDURE — 97112 NEUROMUSCULAR REEDUCATION: CPT

## 2024-04-18 PROCEDURE — 97140 MANUAL THERAPY 1/> REGIONS: CPT

## 2024-04-18 PROCEDURE — 97110 THERAPEUTIC EXERCISES: CPT

## 2024-04-18 NOTE — PROGRESS NOTES
"Daily Note     Today's date: 2024  Patient name: Bree Millan  : 1965  MRN: [id number]  Referring provider: Kocher, Barbara, CRNP  Dx: No diagnosis found.    Start Time: 708          Subjective: Pain of 4/10 at B LB. I just got off of work. Painis a little more on the L than the R.     Objective: See treatment diary below    Assessment: Pt with good sonia to session. A few vc's needed for correct form. Pt reports more pain and difficulty when performing R LE SLR and clamshells vs L.  Pt reports relief with MT to B HS and STM to B piriformis region. Pt reports use of self massage at home. Patient would benefit from continued PT    Plan: Continue per plan of care.      Precautions: Latex allergy    Re-eval Date: 2024      Manuals 4/3 4/10 4-16 4-18         Lumbar P-A mobilizations L1-S1 Gr III 1x10 ea            Neto sidelying hip flexor stretch JA JA EC EC         STM L glute medius JAVIER JA EC EC                      Neuro Re-Ed            TrA activation             PPT HEP 5\" 2x10 5\"   2x10 5\"  2X10         PPT w/ marches  1x10  1x10 1X10         PPT w/ SLR  1x5 neto *pain on left 1x5 ea neto 1X10 ea  Neto           Palloff press                                       Ther Ex            Nustep  L2 5' L2  5' L1  5'         Supine hip flexor stretch HEP            LTR             DKTC             Standing lumbar ext  5\"x10 5\" x10 5\" x10         Seated lumbar flex w/ p-ball  5\"x10 flex 5\" x10  flex 5\" x10  flex         Bridges HEP 5\" 1x10 5\"  1x10 5\" x10         SLR             Clamshells HEP 1x10, 1x5 L, 2x10 R  L 1x15  R 1x5 pain L 1x12  R 1x10  pain         S/L hip abd HEP 2x10 neto Held Held         Prone hip ext             Squat  1x10 1x10 1x10         Leg press             LF hip abd                          Ther Activity                                       Gait Training                                       Modalities             [facility]                                   "

## 2024-04-23 ENCOUNTER — OFFICE VISIT (OUTPATIENT)
Dept: PHYSICAL THERAPY | Facility: HOME HEALTHCARE | Age: 59
End: 2024-04-23
Payer: COMMERCIAL

## 2024-04-23 DIAGNOSIS — M46.1 SACROILIITIS (HCC): ICD-10-CM

## 2024-04-23 DIAGNOSIS — M51.16 LUMBAR DISC DISEASE WITH RADICULOPATHY: ICD-10-CM

## 2024-04-23 DIAGNOSIS — M47.816 LUMBAR SPONDYLOSIS: ICD-10-CM

## 2024-04-23 DIAGNOSIS — G89.4 CHRONIC PAIN SYNDROME: Primary | ICD-10-CM

## 2024-04-23 PROCEDURE — 97112 NEUROMUSCULAR REEDUCATION: CPT

## 2024-04-23 PROCEDURE — 97140 MANUAL THERAPY 1/> REGIONS: CPT

## 2024-04-23 PROCEDURE — 97110 THERAPEUTIC EXERCISES: CPT

## 2024-04-23 NOTE — PROGRESS NOTES
"Daily Note     Today's date: 2024  Patient name: Bree Millan  : 1965  MRN: [id number]  Referring provider: Kocher, Barbara, CRNP  Dx:   Encounter Diagnosis     ICD-10-CM    1. Chronic pain syndrome  G89.4       2. Lumbar spondylosis  M47.816       3. Lumbar disc disease with radiculopathy  M51.16       4. Sacroiliitis (HCC)  M46.1                      Subjective: pain is \"not bad\" this morning. The pain is worse when she's stationary or with certain sleep positions.     She is noticing an improvement with PT and HEP.       Objective: See treatment diary below      Assessment: Tolerated treatment well. Patient continues to benefit from manuals to address soft tissue restrictions in the L medius and promote hip extension bilat. Reviewed hip flexor stretching HEP to ensure efficacy. Positive response to lumbar mobility tasks, cuing for AWA to ensure good form.  Patient demonstrated fatigue post session and would benefit from continued PT.          Plan: Continue per plan of care.       Precautions: Latex allergy    Re-eval Date: 2024      Manuals 4/3 4/10 4-16 4-18 4/23        Lumbar P-A mobilizations L1-S1 Gr III 1x10 ea            Neto sidelying hip flexor stretch JAVIER FELIZ        STM L glute medius JAVIER FELIZ                     Neuro Re-Ed           TrA activation             PPT HEP 5\" 2x10 5\"   2x10 5\"  2X10 5\"  2X10        PPT w/ marches  1x10  1x10 1X10 1x10        PPT w/ SLR  1x5 neto *pain on left 1x5 ea neto 1X10 ea  Neto   1X10 ea  Neto        Palloff press                                       Ther Ex           Nustep  L2 5' L2  5' L1  5' L1 7'        Supine hip flexor stretch HEP            LTR             DKTC             Standing lumbar ext  5\"x10 5\" x10 5\" x10 5\" x10        Seated lumbar flex w/ p-ball  5\"x10 flex 5\" x10  flex 5\" x10  flex 5\" x10  flex        Bridges HEP 5\" 1x10 5\"  1x10 5\" x10 5\"x10        SLR             Clamshells HEP 1x10, " 1x5 L, 2x10 R  L 1x15  R 1x5 pain L 1x12  R 1x10  pain 10x bilat yellow band        S/L hip abd HEP 2x10 neto Held Held         Prone hip ext             Squat  1x10 1x10 1x10 /        Leg press             LF hip abd                          Ther Activity                                       Gait Training                                       Modalities             P

## 2024-04-25 ENCOUNTER — APPOINTMENT (OUTPATIENT)
Dept: PHYSICAL THERAPY | Facility: HOME HEALTHCARE | Age: 59
End: 2024-04-25
Payer: COMMERCIAL

## 2024-04-29 ENCOUNTER — APPOINTMENT (OUTPATIENT)
Dept: RADIOLOGY | Facility: CLINIC | Age: 59
End: 2024-04-29
Payer: COMMERCIAL

## 2024-04-29 ENCOUNTER — OFFICE VISIT (OUTPATIENT)
Dept: OBGYN CLINIC | Facility: CLINIC | Age: 59
End: 2024-04-29
Payer: COMMERCIAL

## 2024-04-29 VITALS
DIASTOLIC BLOOD PRESSURE: 76 MMHG | WEIGHT: 221.8 LBS | HEIGHT: 69 IN | SYSTOLIC BLOOD PRESSURE: 118 MMHG | BODY MASS INDEX: 32.85 KG/M2

## 2024-04-29 DIAGNOSIS — M18.0 ARTHRITIS OF CARPOMETACARPAL (CMC) JOINT OF BOTH THUMBS: Primary | ICD-10-CM

## 2024-04-29 DIAGNOSIS — G56.03 BILATERAL CARPAL TUNNEL SYNDROME: ICD-10-CM

## 2024-04-29 DIAGNOSIS — M18.0 ARTHRITIS OF CARPOMETACARPAL (CMC) JOINT OF BOTH THUMBS: ICD-10-CM

## 2024-04-29 PROCEDURE — 73140 X-RAY EXAM OF FINGER(S): CPT

## 2024-04-29 PROCEDURE — 99204 OFFICE O/P NEW MOD 45 MIN: CPT | Performed by: ORTHOPAEDIC SURGERY

## 2024-04-29 RX ORDER — CHLORHEXIDINE GLUCONATE ORAL RINSE 1.2 MG/ML
15 SOLUTION DENTAL ONCE
OUTPATIENT
Start: 2024-04-29 | End: 2024-04-29

## 2024-04-29 NOTE — PROGRESS NOTES
ASSESSMENT/PLAN:    Assessment:   Bilateral CMC arthritis  Bilateral carpal tunnel syndrome right > left    Plan:   Conservative and operative treatments were discussed with the patient at length  She would like to proceed with surgical intervention for both of these issues  Detailed consent was obtained for right endoscopic carpal tunnel release and right thumb CMC interpositional arthroplasty performed under regional anesthesia.  We will followed by left endoscopic carpal tunnel release and left thumb CMC interpositional arthroplasty performed 4 months later  Risk and benefits to the procedure were discussed with the patient at length  She will follow-up after surgery for suture removal    Follow Up:  After Surgery    To Do Next Visit:  Sutures out        Operative Discussions:  Endoscopic Carpal Tunnel Release:   The anatomy and physiology of carpal tunnel syndrome was discussed with the patient today.  Increase pressure localized under the transverse carpal ligament can cause pain, numbness, tingling, or dysesthesias within the median nerve distribution as well as feelings of fatigue, clumsiness, or awkwardness.  These symptoms typically occur at night and worse in the morning upon waking.  Eventually, untreated carpal tunnel syndrome can result in weakness and permanent loss of muscle within the thenar compartment of the hand.  Treatment options were discussed with the patient.  Conservative treatment includes nocturnal resting splints to keep the nerve in a neutral position, ergonomic changes within the work or home environment, activity modification, and tendon gliding exercises.  Steroid injections within the carpal canal can help a majority of patients, however this is often self-limited in a majority of patients.  Surgical intervention to divide the transverse carpal ligament typically results in a long-lasting relief of the patient's complaints, with the recurrence rate of less than 1%. The patient has  elected to undergo an endoscopic carpal tunnel release.  The 2 incision technique was discussed with the patient, which results in approximately a two-week less recovery time, and less wound complications.  In the postoperative period, light activities are allowed immediately, driving is allowed when narcotic medication has stopped, and the bandages may be removed and incision may get wet after 2 days.  Heavy activities (lifting more than approximately 10 pounds) will be allowed after follow up appointment in 1-2 weeks.  While the pain and discomfort in the hands generally improves rapidly, the numbness and tingling as well as the strength will slowly improve over weeks to months depending on the severity of the carpal tunnel syndrome.  Pillar pain was discussed with the patient, which is typically a common but self-limiting condition.  The risks of bleeding and infection from the surgery are less than 1%.  Risk of recurrence is approximately 0.5%.  The risks of nerve injury or nerve damage or damage to the blood vessels is approximately 1 in 1200. The patient has an understanding of the above mentioned discussion.The risks and benefits of the procedure were explained to the patient, which include, but are not limited to: Bleeding, infection, recurrence, pain, scar, damage to tendons, damage to nerves, and damage to blood vessels, failure to give desired results and complications related to anesthesia.  These risks, along with alternative conservative treatment options, and postoperative protocols were voiced back and understood by the patient.  All questions were answered to the patient's satisfaction.  The patient agrees to comply with a standard postoperative protocol, and is willing to proceed.  Education was provided via written and auditory forms.  There were no barriers to learning. Written handouts regarding wound care, incision and scar care, and general preoperative information was provided to the patient.   Prior to surgery, the patient may be requested to stop all anti-inflammatory medications.  Prophylactic aspirin, Plavix, and Coumadin may be allowed to be continued.  Medications including vitamin E., ginkgo, and fish oil are requested to be stopped approximately one week prior to surgery.  Hypertensive medications and beta blockers, if taken, should be continued.  Thumb CMC Interpositional Arthroplasty:   The anatomy and physiology of carpometacarpal joint arthritis was discussed with the patient today in the office.  Deterioration of the articular cartilage eventually leads to hypermobility at the thumb CMC joint, resulting in joint subluxation, osteophyte formation, cystic changes within the trapezium and base of the first metacarpal, as well as subchondral sclerosis.  Eventually, pain, limited mobility, and compensatory hyperextension at the metacarpophalangeal joint may develop.  While normal activity and usage of the thumb joint may provide a painful experience to the patient, this typically does not result in damage to the thumb or hand.  Treatment options include resting thumb spica splints to decreased joint edema, pain, and inflammation.  Therapy exercises to strengthen the thenar musculature may relieve pain, but do not alter the overall continued development of osteoarthritis.  Oral medications, topical medications, corticosteroid injections may decrease pain and increase overall function.  Eventually, approximately 5% of patients may require surgical intervention.   The risks and benefit of surgery were discussed with the patient.  The patient is aware of the incision, the possible harvesting of the flexor carpi radialis tendon, as well as the interposition portion of the procedure.  After the procedure, the pain will be decreased, the function improved, and the strength improved.  The potential for numbness around the incision site was also discussed.  The patient is aware that in the postoperative  period, a bulky dressing will be worn for roughly 10 days, followed by a removable comfort cool device for 4 additional weeks.  This comfort cool may be removed for showering, bathing, and daily therapy exercises.  At 6 weeks postoperative, strengthening will continue for the next 6-8 weeks.  The patient is aware that the average total recovery time is approximately 3-4 months.  Success rate is approximately 90-95%. The risks and benefits of the procedure were explained to the patient, which include, but are not limited to: Bleeding, infection, recurrence, pain, scar, damage to tendons, damage to nerves, and damage to blood vessels, failure to give desired results and complications related to anesthesia.  These risks, along with alternative conservative treatment options, and postoperative protocols were voiced back and understood by the patient.  All questions were answered to the patient's satisfaction.  The patient agrees to comply with a standard postoperative protocol, and is willing to proceed.  Education was provided via written and auditory forms.  There were no barriers to learning. Written handouts regarding wound care, incision and scar care, and general preoperative information was provided to the patient.  Prior to surgery, the patient may be requested to stop all anti-inflammatory medications.  Prophylactic aspirin, Plavix, and Coumadin may be allowed to be continued.  Medications including vitamin E., ginkgo, and fish oil are requested to be stopped approximately one week prior to surgery.  Hypertensive medications and beta blockers, if taken, should be continued.    _____________________________________________________  CHIEF COMPLAINT:  Chief Complaint   Patient presents with    Right Wrist - Pain     CTS   EMG - 3/1/24    Left Wrist - Pain     CTS   EMG - 3/1/24         SUBJECTIVE:  Bree Millan is a 59 y.o. female who presents bilateral hand pain and numbness and tingling.  She states has  been going on for about 30 years.  She states is progressively gotten worse over the last 2 years.  She states that she has been dropping things.  She has tried the use of a brace initially with relief of her symptoms but progressively it has gotten worse and it does not help at nighttime.  She also notes pain at the base of both of her thumbs and will note some difficulty grasping onto heavy objects.    PAST MEDICAL HISTORY:  Past Medical History:   Diagnosis Date    Anesthesia     Pt states it takes her a long time to wake    Bipolar 1 disorder (HCC)     Disease of thyroid gland     Thyroid cancer (HCC)        PAST SURGICAL HISTORY:  Past Surgical History:   Procedure Laterality Date    BLADDER NECK RECONSTRUCTION      BLADDER SUSPENSION      EPIDURAL BLOCK INJECTION Left 2024    Procedure: Left L2-3 and L3-4 transforaminal dural steroid injection;  Surgeon: Alexandra Garcia MD;  Location: [facility];  Service: Pain Management     HYSTERECTOMY      MAMMO STEREOTACTIC BREAST BIOPSY RIGHT (ALL INC) Right 2022    [facility] CORRECTION HAMMERTOE Left 2023    Procedure: REPAIR HAMMERTOE / MALLET TOE / CLAW TOE, L 4, 5 hammertoe repair.;  Surgeon: Miguel Angel Warren DPM;  Location: [facility];  Service: Podiatry    ROTATOR CUFF REPAIR Bilateral     done twice on each side    SHOULDER SURGERY      THYROIDECTOMY, PARTIAL         FAMILY HISTORY:  Family History   Problem Relation Age of Onset    No Known Problems Mother     No Known Problems Father     No Known Problems Sister     No Known Problems Sister     No Known Problems Sister     Throat cancer Maternal Grandfather     Breast cancer Maternal Aunt 60    Brain cancer Maternal Aunt     No Known Problems Paternal Aunt        SOCIAL HISTORY:  Social History     Tobacco Use    Smoking status: Former     Current packs/day: 0.00     Types: Cigarettes     Quit date:      Years since quittin.3    Smokeless tobacco: Never   Vaping Use    Vaping  "status: Never Used   Substance Use Topics    Alcohol use: No    Drug use: No       MEDICATIONS:    Current Outpatient Medications:     acetaminophen (TYLENOL) 500 mg tablet, Take 1,000 mg by mouth every 6 (six) hours as needed for mild pain, Disp: , Rfl:     Synthroid 100 MCG tablet, Take 1 tablet (100 mcg total) by mouth daily, Disp: 90 tablet, Rfl: 3    ALLERGIES:  Allergies   Allergen Reactions    Demerol [Meperidine]     Latex Rash       REVIEW OF SYSTEMS:  Pertinent items are noted in HPI.  A comprehensive review of systems was negative.    LABS:  HgA1c:   Lab Results   Component Value Date    HGBA1C 5.6 03/17/2023     BMP:   Lab Results   Component Value Date    CALCIUM 9.5 05/03/2023    K 3.8 05/03/2023    CO2 24 05/03/2023     05/03/2023    BUN 12 05/03/2023    CREATININE 0.86 05/03/2023       _____________________________________________________  PHYSICAL EXAMINATION:  Vital signs: /76   Ht 5' 9\" (1.753 m)   Wt 101 kg (221 lb 12.8 oz)   BMI 32.75 kg/m²   General: well developed and well nourished, alert, oriented times 3, and appears comfortable  Psychiatric: Normal  HEENT: Trachea Midline, No torticollis  Cardiovascular: No discernable arrhythmia  Pulmonary: No wheezing or stridor  Abdomen: No rebound or guarding  Extremities: No peripheral edema  Skin: No masses, erythema, lacerations, fluctation, ulcerations  Neurovascular: Sensation intact to the Ulnar Nerve, Sensation Intact to the Radial Nerve, Decreased Sensation to  the Median Nerve, Motor Intact to the Median, Ulnar, Radial Nerve, and Pulses Intact    MUSCULOSKELETAL EXAMINATION:  Bilateral Carpal Tunnel Exam:    Negative thenar atrophy. Negative phalen's test. Positive carpal tunnel compression. Positive tinels over median nerve at the wrist.  Opposition strength 5/5.  Abduction strength 5/5.      Bilateral CMC Exam:  No adduction contracture  No hyperextension deformity of MCP joint  Positive localized tenderness over radial and " dorsal aspect of thumb (CMC joint)  Grind test is Positive for pain and Positive for crepitus  No triggering or tenderness over the A1 pulley  No pain with Finkelstein’s maneuver           _____________________________________________________  STUDIES REVIEWED:  EMG bilateral upper extremities demonstrated moderate carpal tunnel on the left and severe carpal tunnel on the right    X-rays of bilateral thumbs were reviewed of the AP, oblique and lateral.  X-rays demonstrated moderate CMC arthritis noted bilaterally, no acute fractures or dislocations noted.      PROCEDURES PERFORMED:  Procedures  No Procedures performed today    Scribe Attestation      I,:  Dion Shelton PA-C am acting as a scribe while in the presence of the attending physician.:       I,:  Renard Negron MD personally performed the services described in this documentation    as scribed in my presence.:

## 2024-04-29 NOTE — PATIENT INSTRUCTIONS
What is it Carpal Tunnel Syndrome?    Carpal tunnel syndrome (CTS) is a condition brought on by increased pressure on the median nerve at the wrist. In effect, it is a pinched nerve at the wrist. Symptoms may include numbness, tingling, and pain in the arm, hand, and fingers. There is a space in the wrist called the carpal tunnel where the median nerve and nine tendons pass from the forearm into the hand (see Figure 1). Carpal tunnel syndrome happens when pressure builds up from swelling in this tunnel and puts pressure on the nerve. When the pressure from the swelling becomes great enough to disturb the way the nerve works, numbness, tingling, and pain may be felt in the hand and fingers (see Figure 2).    What causes it?  Usually the cause is unknown. Pressure on the nerve can happen several ways: swelling of the lining of the flexor tendons, called tenosynovitis; joint dislocations, fractures, and arthritis can narrow the tunnel; and keeping the wrist bent for long periods of time. Fluid retention during pregnancy can cause swelling in the tunnel and symptoms of carpal tunnel syndrome, which often go away after delivery. Thyroid conditions, rheumatoid arthritis, and diabetes also can be associated with carpal tunnel syndrome. There may be a combination of causes.    Signs and symptoms  Carpal tunnel syndrome symptoms usually include pain, numbness, tingling, or a combination of the three. The numbness or tingling most often takes place in the thumb, index, middle, and ring fingers. The symptoms usually are felt during the night but also may be noticed during daily activities such as driving or reading a newspaper. Patients may sometimes notice a weaker , occasional clumsiness, and a tendency to drop things. In severe cases, sensation may be permanently lost and the muscles at the base of the thumb slowly shrink (thenar atrophy), causing difficulty with pinch.    Diagnosis  A detailed history including medical  conditions, how the hands have been used, and whether there were any prior injuries is important. An x-ray may be taken to check for the other causes of the complaints such as arthritis or a fracture. In some cases, laboratory tests may be done if there is a suspected medical condition that is associated with CTS. Electrodiagnostic studies (NCV-nerve conduction velocities and EMG-electromyogram) may be done to confirm the diagnosis of carpal tunnel syndrome as well as to check for other possible nerve problems.    Treatment  Symptoms may often be relieved without surgery. Identifying and treating medical conditions, changing the patterns of hand use, or keeping the wrist splinted in a straight position may help reduce pressure on the nerve. Wearing wrist splints at night may relieve the symptoms that interfere with sleep. A steroid injection into the carpal tunnel may help relieve the symptoms by reducing swelling around the nerve.    When symptoms are severe or do not improve, surgery may be needed to make more room for the nerve. Pressure on the nerve is decreased by cutting the ligament that forms the roof (top) of the tunnel on the palm side of the hand (see Figure 3). Incisions for this surgery may vary, but the goal is the same: to enlarge the tunnel and decrease pressure on the nerve. Following surgery, soreness around the incision may last for several weeks or months. The numbness and tingling may disappear quickly or slowly. It may take several months for strength in the hand and wrist to return to normal. Carpal tunnel symptoms may not completely go away after surgery, especially in severe cases.        © 2012 [employer]  www.handcare.org      What is it CMC Arthritis?  In a normal joint, cartilage covers the end of the bones and serves as a shock absorber to allow smooth, pain-free movement. In osteoarthritis (OA, or “degenerative arthritis”) the cartilage layer wears out,  resulting in direct contact between the bones and producing pain and deformity. One of the most common joints to develop OA in the hand is the base of the thumb. The thumb basal joint, also called the carpometacarpal (CMC) joint, is a specialized saddle shaped joint that is formed by a small bone of the wrist (trapezium) and the first bone of the thumb (metacarpal). The saddle shaped joint allows the thumb to have a wide range of motions, including up, down, across the palm, and the ability to pinch (see Figure 1).    Who gets it?  OA at the base of the thumb is more commonly seen in women over the age of 40. The exact cause is unknown, but genetics, previous injuries such as fractures or dislocations, and generalized joint laxity may predispose towards development of this type of arthritis.    What are the symptoms and signs?  The most common symptom is pain at the base of the thumb. The pain can be aggravated by activities that require pinching, such as opening jars, turning door knobs or keys, and writing. Severity can also progress to pain at rest and pain at night. In more severe cases, progressive destruction and mal-alignment of the joint occurs, and a bump develops at the base of the thumb as the metacarpal moves out of the saddle joint. This shift in the joint can cause limited motion and weakness, making pinch difficult (see Figure 2). The next joint above the CMC may compensate by loosening, causing it to bend further back (hyperextension).           How is the diagnosis made?  The diagnosis is made by history and physical evaluation. Pressure and movement such as twisting will produce pain at the joint. A grinding sensation may also be present at the joint (see Figure 3). X-rays are used to confirm the diagnosis, although symptom severity often does not correlate with x-ray findings.     What are the treatment options?   Less severe thumb arthritis will usually respond to non-surgical care. Arthritis  medication, splinting and limited cortisone injections may help alleviate pain. A hand therapist might provide a variety of rigid and non-rigid splints which can be used while sleeping or during activities. Patients with advanced disease or who fail non-surgical treatment may be candidates for surgical reconstruction. A variety of surgical techniques are available that can successfully reduce or eliminate pain. Surgical procedures include removal of arthritic bone and joint reconstruction (arthroplasty), joint fusion, bone realignment, and even arthroscopy in select cases. A consultation with your hand surgeon can help decide the best option for you (see Figure 4).           © 2012 [employer] Hand  www.handcare.org

## 2024-05-01 DIAGNOSIS — E89.0 POSTOPERATIVE HYPOTHYROIDISM: ICD-10-CM

## 2024-05-02 DIAGNOSIS — E89.0 POSTOPERATIVE HYPOTHYROIDISM: ICD-10-CM

## 2024-05-03 RX ORDER — LEVOTHYROXINE SODIUM 100 MCG
100 TABLET ORAL DAILY
Qty: 90 TABLET | Refills: 1 | Status: SHIPPED | OUTPATIENT
Start: 2024-05-03

## 2024-05-04 RX ORDER — LEVOTHYROXINE SODIUM 100 MCG
100 TABLET ORAL DAILY
Qty: 90 TABLET | Refills: 1 | Status: SHIPPED | OUTPATIENT
Start: 2024-05-04

## 2024-05-08 ENCOUNTER — TELEPHONE (OUTPATIENT)
Dept: INTERNAL MEDICINE CLINIC | Facility: CLINIC | Age: 59
End: 2024-05-08

## 2024-05-11 NOTE — TELEPHONE ENCOUNTER
PA for Synthroid 100 MCG tablet     Submitted via    []CMM-KEY   [x]Surescripts-Case ID # [id number]   []Faxed to plan   []Other website   []Phone call Case ID #     Office notes sent, clinical questions answered. Awaiting determination    Turnaround time for your insurance to make a decision on your Prior Authorization can take 7-21 business days.

## 2024-05-13 ENCOUNTER — OFFICE VISIT (OUTPATIENT)
Dept: INTERNAL MEDICINE CLINIC | Facility: CLINIC | Age: 59
End: 2024-05-13
Payer: COMMERCIAL

## 2024-05-13 VITALS
WEIGHT: 222.3 LBS | DIASTOLIC BLOOD PRESSURE: 61 MMHG | OXYGEN SATURATION: 99 % | HEIGHT: 69 IN | TEMPERATURE: 97.7 F | HEART RATE: 74 BPM | BODY MASS INDEX: 32.92 KG/M2 | SYSTOLIC BLOOD PRESSURE: 131 MMHG

## 2024-05-13 DIAGNOSIS — Z00.00 ROUTINE MEDICAL EXAM: ICD-10-CM

## 2024-05-13 DIAGNOSIS — E78.00 PURE HYPERCHOLESTEROLEMIA, UNSPECIFIED: ICD-10-CM

## 2024-05-13 DIAGNOSIS — D47.3 ESSENTIAL THROMBOCYTOSIS (HCC): ICD-10-CM

## 2024-05-13 DIAGNOSIS — Z00.00 ROUTINE ADULT HEALTH MAINTENANCE: Primary | ICD-10-CM

## 2024-05-13 DIAGNOSIS — F31.9 BIPOLAR AFFECTIVE DISORDER, REMISSION STATUS UNSPECIFIED (HCC): ICD-10-CM

## 2024-05-13 DIAGNOSIS — M51.36 LUMBAR DEGENERATIVE DISC DISEASE: ICD-10-CM

## 2024-05-13 DIAGNOSIS — Z12.11 SCREENING FOR COLON CANCER: ICD-10-CM

## 2024-05-13 DIAGNOSIS — Z12.31 ENCOUNTER FOR SCREENING MAMMOGRAM FOR MALIGNANT NEOPLASM OF BREAST: ICD-10-CM

## 2024-05-13 DIAGNOSIS — E03.9 HYPOTHYROIDISM, UNSPECIFIED TYPE: ICD-10-CM

## 2024-05-13 PROBLEM — L98.499 CALLOUS ULCER (HCC): Status: RESOLVED | Noted: 2019-02-13 | Resolved: 2024-05-13

## 2024-05-13 PROBLEM — L84 CORN OF FOOT: Status: RESOLVED | Noted: 2023-05-01 | Resolved: 2024-05-13

## 2024-05-13 PROCEDURE — 99396 PREV VISIT EST AGE 40-64: CPT | Performed by: NURSE PRACTITIONER

## 2024-05-13 NOTE — PROGRESS NOTES
Name: Bree Millan      : 1965      MRN: [id number]  Encounter Provider: MARIANN Stahl  Encounter Date: 2024   Encounter department: [facility]    Assessment & Plan Will repeat fasting labs. Will give script for cologuard and mammogram. Will start on Wegovy take as directed. Will notify once labs are back. Will follow up in 3 months.      1. Routine adult health maintenance  -     Comprehensive metabolic panel; Future  -     CBC and differential; Future    2. Pure hypercholesterolemia, unspecified  -     Comprehensive metabolic panel; Future  -     CBC and differential; Future  -     Lipid panel; Future    3. Bipolar affective disorder, remission status unspecified (HCC)  -     Comprehensive metabolic panel; Future  -     CBC and differential; Future    4. Essential thrombocytosis (HCC)  -     Comprehensive metabolic panel; Future  -     CBC and differential; Future    5. Hypothyroidism, unspecified type  -     Comprehensive metabolic panel; Future  -     CBC and differential; Future  -     TSH, 3rd generation with Free T4 reflex; Future    6. Lumbar degenerative disc disease  -     Comprehensive metabolic panel; Future  -     CBC and differential; Future    7. Routine medical exam  -     Comprehensive metabolic panel; Future  -     CBC and differential; Future  -     Hemoglobin A1C    8. BMI 32.0-32.9,adult  -     Semaglutide-Weight Management (WEGOVY) 0.25 MG/0.5ML; Inject 0.5 mL (0.25 mg total) under the skin once a week    9. Encounter for screening mammogram for malignant neoplasm of breast  -     Mammo screening bilateral w 3d & cad; Future    10. Screening for colon cancer  -     Cologuard           Subjective      Bree is for a wellness. She is doing well and having carpal tunnel surgery in July. She is upset about her weight and would like to try something for weight loss. She is active and does see pain management. She is willing to do a  "mammogram and cologuard. She is due for labs. She offers no other issues.      Review of Systems   Musculoskeletal:  Positive for arthralgias and myalgias.   All other systems reviewed and are negative.      Current Outpatient Medications on File Prior to Visit   Medication Sig    acetaminophen (TYLENOL) 500 mg tablet Take 1,000 mg by mouth every 6 (six) hours as needed for mild pain    Synthroid 100 MCG tablet TAKE ONE TABLET BY MOUTH DAILY    [DISCONTINUED] Synthroid 100 MCG tablet Take 1 tablet (100 mcg total) by mouth daily (Patient not taking: Reported on 5/13/2024)       Objective     /61   Pulse 74   Temp 97.7 °F (36.5 °C)   Ht 5' 9\" (1.753 m)   Wt 101 kg (222 lb 4.8 oz)   SpO2 99%   BMI 32.83 kg/m²     Physical Exam  Vitals reviewed.   Constitutional:       Appearance: Normal appearance. She is obese.   HENT:      Head: Normocephalic and atraumatic.      Right Ear: Tympanic membrane, ear canal and external ear normal.      Left Ear: Tympanic membrane, ear canal and external ear normal.      Nose: Nose normal.      Mouth/Throat:      Mouth: Mucous membranes are moist.      Pharynx: Oropharynx is clear.   Eyes:      Extraocular Movements: Extraocular movements intact.      Conjunctiva/sclera: Conjunctivae normal.      Pupils: Pupils are equal, round, and reactive to light.   Cardiovascular:      Rate and Rhythm: Normal rate and regular rhythm.      Pulses: Normal pulses.      Heart sounds: Normal heart sounds.   Pulmonary:      Effort: Pulmonary effort is normal.      Breath sounds: Normal breath sounds.   Abdominal:      General: Abdomen is flat. Bowel sounds are normal.      Palpations: Abdomen is soft.   Musculoskeletal:         General: Normal range of motion.      Cervical back: Normal range of motion and neck supple.   Skin:     General: Skin is warm and dry.      Capillary Refill: Capillary refill takes less than 2 seconds.   Neurological:      General: No focal deficit present.      Mental " Status: She is alert and oriented to person, place, and time. Mental status is at baseline.   Psychiatric:         Mood and Affect: Mood normal.         Behavior: Behavior normal.         Thought Content: Thought content normal.         Judgment: Judgment normal.       MARIANN Stahl

## 2024-05-14 NOTE — TELEPHONE ENCOUNTER
PA for Synthroid 100 MCG tablet  not required     Reason- (screenshot if applicable)              Message sent to provider pool No

## 2024-05-15 ENCOUNTER — TELEPHONE (OUTPATIENT)
Age: 59
End: 2024-05-15

## 2024-05-15 NOTE — TELEPHONE ENCOUNTER
PA for Semaglutide-Weight Management (WEGOVY) 0.25 MG/0.5ML Approved   Date(s) approved 5/12/24-5/12/25  Case #[id number]    Patient advised by [x] [employer] Message                      [x] Phone call       Pharmacy advised by []Fax                                     [x]Phone call    Approval letter scanned into Media No

## 2024-05-15 NOTE — TELEPHONE ENCOUNTER
PA for Semaglutide-Weight Management (WEGOVY) 0.25 MG/0.5ML     Submitted via    [][employer]-KEY   [x][employer]-Case ID # [id number]   []Faxed to plan   []Other website   []Phone call Case ID #     Office notes sent, clinical questions answered. Awaiting determination    Turnaround time for your insurance to make a decision on your Prior Authorization can take 7-21 business days.

## 2024-06-03 ENCOUNTER — APPOINTMENT (OUTPATIENT)
Dept: LAB | Facility: HOSPITAL | Age: 59
End: 2024-06-03
Payer: COMMERCIAL

## 2024-06-03 DIAGNOSIS — Z00.00 ROUTINE MEDICAL EXAM: ICD-10-CM

## 2024-06-03 DIAGNOSIS — M51.36 LUMBAR DEGENERATIVE DISC DISEASE: ICD-10-CM

## 2024-06-03 DIAGNOSIS — E78.00 PURE HYPERCHOLESTEROLEMIA, UNSPECIFIED: ICD-10-CM

## 2024-06-03 DIAGNOSIS — E03.9 HYPOTHYROIDISM, UNSPECIFIED TYPE: ICD-10-CM

## 2024-06-03 DIAGNOSIS — Z00.00 ROUTINE ADULT HEALTH MAINTENANCE: ICD-10-CM

## 2024-06-03 DIAGNOSIS — F31.9 BIPOLAR AFFECTIVE DISORDER, REMISSION STATUS UNSPECIFIED (HCC): ICD-10-CM

## 2024-06-03 DIAGNOSIS — D47.3 ESSENTIAL THROMBOCYTOSIS (HCC): ICD-10-CM

## 2024-06-03 LAB
ALBUMIN SERPL BCP-MCNC: 4.4 G/DL (ref 3.5–5)
ALP SERPL-CCNC: 53 U/L (ref 34–104)
ALT SERPL W P-5'-P-CCNC: 13 U/L (ref 7–52)
ANION GAP SERPL CALCULATED.3IONS-SCNC: 8 MMOL/L (ref 4–13)
AST SERPL W P-5'-P-CCNC: 15 U/L (ref 13–39)
BASOPHILS # BLD AUTO: 0.08 THOUSANDS/ÂΜL (ref 0–0.1)
BASOPHILS NFR BLD AUTO: 1 % (ref 0–1)
BILIRUB SERPL-MCNC: 0.49 MG/DL (ref 0.2–1)
BUN SERPL-MCNC: 10 MG/DL (ref 5–25)
CALCIUM SERPL-MCNC: 10.2 MG/DL (ref 8.4–10.2)
CHLORIDE SERPL-SCNC: 107 MMOL/L (ref 96–108)
CHOLEST SERPL-MCNC: 196 MG/DL
CO2 SERPL-SCNC: 26 MMOL/L (ref 21–32)
CREAT SERPL-MCNC: 0.76 MG/DL (ref 0.6–1.3)
EOSINOPHIL # BLD AUTO: 0.35 THOUSAND/ÂΜL (ref 0–0.61)
EOSINOPHIL NFR BLD AUTO: 6 % (ref 0–6)
ERYTHROCYTE [DISTWIDTH] IN BLOOD BY AUTOMATED COUNT: 12.8 % (ref 11.6–15.1)
EST. AVERAGE GLUCOSE BLD GHB EST-MCNC: 120 MG/DL
GFR SERPL CREATININE-BSD FRML MDRD: 85 ML/MIN/1.73SQ M
GLUCOSE P FAST SERPL-MCNC: 86 MG/DL (ref 65–99)
HBA1C MFR BLD: 5.8 %
HCT VFR BLD AUTO: 44.1 % (ref 34.8–46.1)
HDLC SERPL-MCNC: 49 MG/DL
HGB BLD-MCNC: 14.8 G/DL (ref 11.5–15.4)
IMM GRANULOCYTES # BLD AUTO: 0 THOUSAND/UL (ref 0–0.2)
IMM GRANULOCYTES NFR BLD AUTO: 0 % (ref 0–2)
LDLC SERPL CALC-MCNC: 121 MG/DL (ref 0–100)
LYMPHOCYTES # BLD AUTO: 1.86 THOUSANDS/ÂΜL (ref 0.6–4.47)
LYMPHOCYTES NFR BLD AUTO: 29 % (ref 14–44)
MCH RBC QN AUTO: 29.5 PG (ref 26.8–34.3)
MCHC RBC AUTO-ENTMCNC: 33.6 G/DL (ref 31.4–37.4)
MCV RBC AUTO: 88 FL (ref 82–98)
MONOCYTES # BLD AUTO: 0.42 THOUSAND/ÂΜL (ref 0.17–1.22)
MONOCYTES NFR BLD AUTO: 7 % (ref 4–12)
NEUTROPHILS # BLD AUTO: 3.64 THOUSANDS/ÂΜL (ref 1.85–7.62)
NEUTS SEG NFR BLD AUTO: 57 % (ref 43–75)
NONHDLC SERPL-MCNC: 147 MG/DL
NRBC BLD AUTO-RTO: 0 /100 WBCS
PLATELET # BLD AUTO: 382 THOUSANDS/UL (ref 149–390)
PMV BLD AUTO: 9.7 FL (ref 8.9–12.7)
POTASSIUM SERPL-SCNC: 3.9 MMOL/L (ref 3.5–5.3)
PROT SERPL-MCNC: 6.9 G/DL (ref 6.4–8.4)
RBC # BLD AUTO: 5.01 MILLION/UL (ref 3.81–5.12)
SODIUM SERPL-SCNC: 141 MMOL/L (ref 135–147)
TRIGL SERPL-MCNC: 130 MG/DL
TSH SERPL DL<=0.05 MIU/L-ACNC: 0.74 UIU/ML (ref 0.45–4.5)
WBC # BLD AUTO: 6.35 THOUSAND/UL (ref 4.31–10.16)

## 2024-06-03 PROCEDURE — 80053 COMPREHEN METABOLIC PANEL: CPT

## 2024-06-03 PROCEDURE — 80061 LIPID PANEL: CPT

## 2024-06-03 PROCEDURE — 84443 ASSAY THYROID STIM HORMONE: CPT

## 2024-06-03 PROCEDURE — 85025 COMPLETE CBC W/AUTO DIFF WBC: CPT

## 2024-06-03 PROCEDURE — 36415 COLL VENOUS BLD VENIPUNCTURE: CPT

## 2024-06-05 LAB — COLOGUARD RESULT REPORTABLE: NORMAL

## 2024-06-12 ENCOUNTER — TELEPHONE (OUTPATIENT)
Age: 59
End: 2024-06-12

## 2024-06-12 PROBLEM — Z00.00 ROUTINE MEDICAL EXAM: Status: RESOLVED | Noted: 2024-05-13 | Resolved: 2024-06-12

## 2024-06-12 PROBLEM — Z00.00 ROUTINE ADULT HEALTH MAINTENANCE: Status: RESOLVED | Noted: 2024-05-13 | Resolved: 2024-06-12

## 2024-06-12 NOTE — TELEPHONE ENCOUNTER
Patient said her wegovy script was increased and she was told by the [facility] that she will need a new script. Please have someone call patient back about the script and dose increase. She is requesting a call anytime after 1:30pm . If office does call before 1:30 pm they may speak to her

## 2024-06-13 RX ORDER — SEMAGLUTIDE 0.5 MG/.5ML
INJECTION, SOLUTION SUBCUTANEOUS
Qty: 2 ML | Refills: 0 | Status: SHIPPED | OUTPATIENT
Start: 2024-06-13

## 2024-06-20 ENCOUNTER — HOSPITAL ENCOUNTER (EMERGENCY)
Facility: HOSPITAL | Age: 59
Discharge: HOME | End: 2024-06-20
Attending: EMERGENCY MEDICINE | Admitting: EMERGENCY MEDICINE
Payer: COMMERCIAL

## 2024-06-20 ENCOUNTER — APPOINTMENT (EMERGENCY)
Dept: RADIOLOGY | Facility: HOSPITAL | Age: 59
End: 2024-06-20
Payer: COMMERCIAL

## 2024-06-20 VITALS
HEART RATE: 61 BPM | BODY MASS INDEX: 32.88 KG/M2 | WEIGHT: 222 LBS | TEMPERATURE: 97.3 F | RESPIRATION RATE: 18 BRPM | SYSTOLIC BLOOD PRESSURE: 135 MMHG | HEIGHT: 69 IN | OXYGEN SATURATION: 96 % | DIASTOLIC BLOOD PRESSURE: 90 MMHG

## 2024-06-20 DIAGNOSIS — J20.9 ACUTE BRONCHITIS: Primary | ICD-10-CM

## 2024-06-20 PROCEDURE — 99285 EMERGENCY DEPT VISIT HI MDM: CPT

## 2024-06-20 PROCEDURE — 71045 X-RAY EXAM CHEST 1 VIEW: CPT

## 2024-06-20 PROCEDURE — 99284 EMERGENCY DEPT VISIT MOD MDM: CPT | Performed by: EMERGENCY MEDICINE

## 2024-06-20 RX ORDER — AZITHROMYCIN 250 MG/1
TABLET, FILM COATED ORAL
Qty: 4 TABLET | Refills: 0 | Status: SHIPPED | OUTPATIENT
Start: 2024-06-20 | End: 2024-06-24

## 2024-06-20 RX ORDER — AZITHROMYCIN 250 MG/1
500 TABLET, FILM COATED ORAL ONCE
Status: COMPLETED | OUTPATIENT
Start: 2024-06-20 | End: 2024-06-20

## 2024-06-20 RX ADMIN — AZITHROMYCIN DIHYDRATE 500 MG: 250 TABLET ORAL at 04:06

## 2024-06-20 NOTE — DISCHARGE INSTRUCTIONS
You have been seen for bronchitis. Please complete the course of azithromycin as prescribed. Please trial loratidine for your sypmtoms. Return to the emergency department if you develop worsening trouble breathing, fevers or any other symptoms of concern. Please follow up with your PCP by calling the number provided.

## 2024-06-20 NOTE — ED PROVIDER NOTES
History  Chief Complaint   Patient presents with    Shortness of Breath     Patient reports sob throughout the day with sweats, body aches, and green productive cough.     Bree Millan is a 59 y.o. year old female with PMH of thyroid CA presenting to the [facility] ED for evaluation of cough and congestion. Patient started several days ago with runny nose and nasal congestion. Symptoms progressed to cough now productive of green sputum. Patient denies fevers though is reporting chills. No new chest pain, N/V or abdominal or back pain. Patient reporting chronic lower extremity edema. Patient  reportedly tested negative for COVID/Flu/RSV. Patient has taken allegra and dayquil at home for symptomatic treatment.      History provided by:  Medical records and patient   used: No    Shortness of Breath  Associated symptoms: cough, headaches and wheezing    Associated symptoms: no abdominal pain, no chest pain, no fever and no vomiting        Prior to Admission Medications   Prescriptions Last Dose Informant Patient Reported? Taking?   Semaglutide-Weight Management (Wegovy) 0.5 MG/0.5ML   No No   Sig: Inject 0.5 mg under the skin weekly   Synthroid 100 MCG tablet   No No   Sig: TAKE ONE TABLET BY MOUTH DAILY   acetaminophen (TYLENOL) 500 mg tablet  Self Yes No   Sig: Take 1,000 mg by mouth every 6 (six) hours as needed for mild pain      Facility-Administered Medications: None       Past Medical History:   Diagnosis Date    Anesthesia     Pt states it takes her a long time to wake    Bipolar 1 disorder (HCC)     Disease of thyroid gland     Thyroid cancer (HCC) 1987       Past Surgical History:   Procedure Laterality Date    BLADDER NECK RECONSTRUCTION      BLADDER SUSPENSION      EPIDURAL BLOCK INJECTION Left 2/20/2024    Procedure: Left L2-3 and L3-4 transforaminal dural steroid injection;  Surgeon: Alexandra Garcia MD;  Location: [facility];  Service: Pain Management     HYSTERECTOMY      MAMMO  STEREOTACTIC BREAST BIOPSY RIGHT (ALL INC) Right 2022    OK CORRECTION HAMMERTOE Left 2023    Procedure: REPAIR HAMMERTOE / MALLET TOE / CLAW TOE, L 4, 5 hammertoe repair.;  Surgeon: Miguel Angel Warren DPM;  Location: [facility];  Service: Podiatry    ROTATOR CUFF REPAIR Bilateral     done twice on each side    SHOULDER SURGERY      THYROIDECTOMY, PARTIAL         Family History   Problem Relation Age of Onset    No Known Problems Mother     No Known Problems Father     No Known Problems Sister     No Known Problems Sister     No Known Problems Sister     Throat cancer Maternal Grandfather     Breast cancer Maternal Aunt 60    Brain cancer Maternal Aunt     No Known Problems Paternal Aunt      I have reviewed and agree with the history as documented.    E-Cigarette/Vaping    E-Cigarette Use Never User      E-Cigarette/Vaping Substances    Nicotine No     THC No      Social History     Tobacco Use    Smoking status: Former     Current packs/day: 0.00     Types: Cigarettes     Quit date:      Years since quittin.4    Smokeless tobacco: Never   Vaping Use    Vaping status: Never Used   Substance Use Topics    Alcohol use: No    Drug use: No       Review of Systems   Constitutional:  Positive for chills and fatigue. Negative for fever.   HENT:  Positive for congestion.    Respiratory:  Positive for cough, shortness of breath and wheezing.    Cardiovascular:  Negative for chest pain and leg swelling.   Gastrointestinal:  Negative for abdominal pain, diarrhea, nausea and vomiting.   Genitourinary:  Negative for flank pain.   Neurological:  Positive for headaches. Negative for syncope.   All other systems reviewed and are negative.      Physical Exam  Physical Exam  Vitals and nursing note reviewed.   Constitutional:       General: She is not in acute distress.     Appearance: Normal appearance. She is well-developed. She is not ill-appearing, toxic-appearing or diaphoretic.   HENT:      Head:  Normocephalic and atraumatic.      Nose: Congestion present. No rhinorrhea.   Eyes:      General:         Right eye: No discharge.         Left eye: No discharge.   Cardiovascular:      Rate and Rhythm: Normal rate and regular rhythm.   Pulmonary:      Effort: Pulmonary effort is normal. No accessory muscle usage or respiratory distress.      Breath sounds: No stridor. Rhonchi present. No decreased breath sounds, wheezing or rales.   Abdominal:      General: There is no distension.      Palpations: Abdomen is soft.      Tenderness: There is no abdominal tenderness. There is no guarding or rebound.   Musculoskeletal:      Cervical back: Normal range of motion and neck supple. No rigidity.      Right lower leg: No tenderness.      Left lower leg: No tenderness.   Skin:     Capillary Refill: Capillary refill takes less than 2 seconds.   Neurological:      Mental Status: She is alert and oriented to person, place, and time.   Psychiatric:         Mood and Affect: Mood normal.         Behavior: Behavior normal.         Vital Signs  ED Triage Vitals [06/20/24 0322]   Temperature Pulse Respirations Blood Pressure SpO2   (!) 97.3 °F (36.3 °C) 61 18 135/90 96 %      Temp Source Heart Rate Source Patient Position - Orthostatic VS BP Location FiO2 (%)   Tympanic Monitor Lying Right arm --      Pain Score       No Pain           Vitals:    06/20/24 0322   BP: 135/90   Pulse: 61   Patient Position - Orthostatic VS: Lying         Visual Acuity      ED Medications  Medications   azithromycin (ZITHROMAX) tablet 500 mg (has no administration in time range)       Diagnostic Studies  Results Reviewed       None                   XR chest portable   ED Interpretation by Hubert Wong DO (06/20 0349)   No PTX. No focal infiltrate. No acute cardiopulmonary disease.                 Procedures  Procedures         ED Course                               SBIRT 20yo+      Flowsheet Row Most Recent Value   Initial Alcohol Screen: US  AUDIT-C     1. How often do you have a drink containing alcohol? 0 Filed at: 06/20/2024 0322   2. How many drinks containing alcohol do you have on a typical day you are drinking?  0 Filed at: 06/20/2024 0322   3a. Male UNDER 65: How often do you have five or more drinks on one occasion? 0 Filed at: 06/20/2024 0322   3b. FEMALE Any Age, or MALE 65+: How often do you have 4 or more drinks on one occassion? 0 Filed at: 06/20/2024 0322   Audit-C Score 0 Filed at: 06/20/2024 0322   KINGS: How many times in the past year have you...    Used an illegal drug or used a prescription medication for non-medical reasons? Never Filed at: 06/20/2024 0322                      Medical Decision Making    59 y.o. female presenting for cough and congestion.  VSS, in NAD.  Will obtain CXR to evaluate for pneumonia. Doubt PTX.  Symptoms more likely related to bronchitis/viral illness or allergic in nature.  Will treat with course of azithromycin for bronchitis.  Patient declines labs or IV fluids.    I have reviewed preliminary ED interpretation of x-rays with the patient. The patient understands that their x-rays will be interpreted independently by a radiologist at a later time. The patient is agreeable to discharge at this time and understands that they may be contacted after discharge from the emergency department pending formal xray interpretation by radiology.     Disposition: I have discussed with the patient our plan to discharge them from the ED and the patient is in agreement with this plan.     Discharge Plan: Rx for azithromycin, encouraged PRN loratidine for symptoms. RTED precautions emphasized. The patient was provided a written after visit summary with strict RTED precautions.     Followup: I have discussed with the patient plan to follow up with their PCP. Contact information provided in AVS.    Amount and/or Complexity of Data Reviewed  Radiology: ordered and independent interpretation performed.    Risk  Prescription  drug management.             Disposition  Final diagnoses:   Acute bronchitis     Time reflects when diagnosis was documented in both MDM as applicable and the Disposition within this note       Time User Action Codes Description Comment    6/20/2024  3:54 AM Hubert Wong Add [J20.9] Acute bronchitis           ED Disposition       ED Disposition   Discharge    Condition   Stable    Date/Time   Thu Jun 20, 2024 0354    Comment   Bree Millan discharge to home/self care.                   Follow-up Information       Follow up With Specialties Details Why Contact Info    [facility]ahl [facility], Nurse Practitioner Schedule an appointment as soon as possible for a visit  For reevaluation if symptoms do not resolve. [address] [place] [id number]  [phone]              Patient's Medications   Discharge Prescriptions    AZITHROMYCIN (ZITHROMAX) 250 MG TABLET    Take 1 tablet daily x 4 days       Start Date: 6/20/2024 End Date: 6/24/2024       Order Dose: --       Quantity: 4 tablet    Refills: 0       No discharge procedures on file.    PDMP Review       None            ED Provider  Electronically Signed by             Hubert Wong,   06/20/24 0357

## 2024-06-20 NOTE — Clinical Note
Bree Millan was seen and treated in our emergency department on 6/20/2024.                Diagnosis: acute bronchitis    Bree  is off the rest of the shift today.    She may return on this date:          If you have any questions or concerns, please don't hesitate to call.      Hubert Wong, DO    ______________________________           _______________          _______________  Hospital Representative                              Date                                Time

## 2024-06-25 ENCOUNTER — ANESTHESIA EVENT (OUTPATIENT)
Age: 59
End: 2024-06-25
Payer: COMMERCIAL

## 2024-06-29 LAB — COLOGUARD RESULT REPORTABLE: NEGATIVE

## 2024-07-02 NOTE — PRE-PROCEDURE INSTRUCTIONS
Pre-Surgery Instructions:   Medication Instructions    acetaminophen (TYLENOL) 500 mg tablet Uses PRN- OK to take day of surgery    Biotin 300 MCG TABS Stop taking 7 days prior to surgery.    Semaglutide-Weight Management (Wegovy) 0.5 MG/0.5ML Stop taking 7 days prior to surgery.-last dose 6/28/24    Synthroid 100 MCG tablet Take night before surgery        Medication instructions for day surgery reviewed. Please use only a sip of water to take your instructed medications. Avoid all over the counter vitamins, supplements and NSAIDS for one week prior to surgery per anesthesia guidelines. Tylenol is ok to take as needed.     You will receive a call one business day prior to surgery with an arrival time and hospital directions. If your surgery is scheduled on a Monday, the hospital will be calling you on the Friday prior to your surgery. If you have not heard from anyone by 5 pm, please call the hospital supervisor through the hospital  at [place] [phone].    Do not eat or drink anything after midnight the night before your surgery, including candy, mints, lifesavers, or chewing gum. Do not drink alcohol 24hrs before your surgery. Try not to smoke at least 24hrs before your surgery.       Follow the pre surgery showering instructions as listed in the “My Surgical Experience Booklet” or otherwise provided by your surgeon's office. Do not use a blade to shave the surgical area 1 week before surgery. It is okay to use a clean electric clippers up to 24 hours before surgery. Do not apply any lotions, creams, including makeup, cologne, deodorant, or perfumes after showering on the day of your surgery. Do not use dry shampoo, hair spray, hair gel, or any type of hair products.     No contact lenses, eye make-up, or artificial eyelashes. Remove nail polish, including gel polish, and any artificial, gel, or acrylic nails if possible. Remove all jewelry including rings and body piercing jewelry.     Wear causal  clothing that is easy to take on and off. Consider your type of surgery.    Keep any valuables, jewelry, piercings at home. Please bring any specially ordered equipment (sling, braces) if indicated.    Arrange for a responsible person to drive you to and from the hospital on the day of your surgery. Please confirm the visitor policy for the day of your procedure when you receive your phone call with an arrival time.     Call the surgeon's office with any new illnesses, exposures, or additional questions prior to surgery.    Please reference your “My Surgical Experience Booklet” for additional information to prepare for your upcoming surgery.

## 2024-07-09 ENCOUNTER — HOSPITAL ENCOUNTER (OUTPATIENT)
Age: 59
Setting detail: OUTPATIENT SURGERY
Discharge: HOME | End: 2024-07-09
Attending: ORTHOPAEDIC SURGERY | Admitting: ORTHOPAEDIC SURGERY
Payer: COMMERCIAL

## 2024-07-09 ENCOUNTER — ANESTHESIA (OUTPATIENT)
Age: 59
End: 2024-07-09
Payer: COMMERCIAL

## 2024-07-09 VITALS
DIASTOLIC BLOOD PRESSURE: 68 MMHG | WEIGHT: 211 LBS | HEIGHT: 68 IN | TEMPERATURE: 97.2 F | BODY MASS INDEX: 31.98 KG/M2 | RESPIRATION RATE: 16 BRPM | HEART RATE: 55 BPM | SYSTOLIC BLOOD PRESSURE: 140 MMHG | OXYGEN SATURATION: 98 %

## 2024-07-09 DIAGNOSIS — G56.03 CARPAL TUNNEL SYNDROME, BILATERAL: Primary | ICD-10-CM

## 2024-07-09 DIAGNOSIS — M18.11 ARTHRITIS OF CARPOMETACARPAL (CMC) JOINT OF RIGHT THUMB: ICD-10-CM

## 2024-07-09 PROCEDURE — 25447 ARTHRP NTRCRP/CRP/MTCR NTRPS: CPT | Performed by: ORTHOPAEDIC SURGERY

## 2024-07-09 PROCEDURE — 25447 ARTHRP NTRCRP/CRP/MTCR NTRPS: CPT | Performed by: PHYSICIAN ASSISTANT

## 2024-07-09 PROCEDURE — 29848 WRIST ENDOSCOPY/SURGERY: CPT | Performed by: PHYSICIAN ASSISTANT

## 2024-07-09 PROCEDURE — NC001 PR NO CHARGE: Performed by: ORTHOPAEDIC SURGERY

## 2024-07-09 PROCEDURE — C9290 INJ, BUPIVACAINE LIPOSOME: HCPCS | Performed by: ANESTHESIOLOGY

## 2024-07-09 PROCEDURE — 29848 WRIST ENDOSCOPY/SURGERY: CPT | Performed by: ORTHOPAEDIC SURGERY

## 2024-07-09 RX ORDER — TRAMADOL HYDROCHLORIDE 50 MG/1
50 TABLET ORAL EVERY 6 HOURS PRN
Status: DISCONTINUED | OUTPATIENT
Start: 2024-07-09 | End: 2024-07-09 | Stop reason: HOSPADM

## 2024-07-09 RX ORDER — FENTANYL CITRATE 50 UG/ML
INJECTION, SOLUTION INTRAMUSCULAR; INTRAVENOUS AS NEEDED
Status: DISCONTINUED | OUTPATIENT
Start: 2024-07-09 | End: 2024-07-09

## 2024-07-09 RX ORDER — ONDANSETRON 2 MG/ML
4 INJECTION INTRAMUSCULAR; INTRAVENOUS EVERY 6 HOURS PRN
Status: DISCONTINUED | OUTPATIENT
Start: 2024-07-09 | End: 2024-07-09 | Stop reason: HOSPADM

## 2024-07-09 RX ORDER — FENTANYL CITRATE/PF 50 MCG/ML
25 SYRINGE (ML) INJECTION
Status: DISCONTINUED | OUTPATIENT
Start: 2024-07-09 | End: 2024-07-09 | Stop reason: HOSPADM

## 2024-07-09 RX ORDER — SODIUM CHLORIDE, SODIUM LACTATE, POTASSIUM CHLORIDE, CALCIUM CHLORIDE 600; 310; 30; 20 MG/100ML; MG/100ML; MG/100ML; MG/100ML
125 INJECTION, SOLUTION INTRAVENOUS CONTINUOUS
Status: DISCONTINUED | OUTPATIENT
Start: 2024-07-09 | End: 2024-07-09 | Stop reason: HOSPADM

## 2024-07-09 RX ORDER — ACETAMINOPHEN 325 MG/1
650 TABLET ORAL EVERY 6 HOURS PRN
Status: DISCONTINUED | OUTPATIENT
Start: 2024-07-09 | End: 2024-07-09 | Stop reason: HOSPADM

## 2024-07-09 RX ORDER — CEFAZOLIN SODIUM 2 G/50ML
2000 SOLUTION INTRAVENOUS ONCE
Status: COMPLETED | OUTPATIENT
Start: 2024-07-09 | End: 2024-07-09

## 2024-07-09 RX ORDER — PROPOFOL 10 MG/ML
INJECTION, EMULSION INTRAVENOUS AS NEEDED
Status: DISCONTINUED | OUTPATIENT
Start: 2024-07-09 | End: 2024-07-09

## 2024-07-09 RX ORDER — MAGNESIUM HYDROXIDE 1200 MG/15ML
LIQUID ORAL AS NEEDED
Status: DISCONTINUED | OUTPATIENT
Start: 2024-07-09 | End: 2024-07-09 | Stop reason: HOSPADM

## 2024-07-09 RX ORDER — MIDAZOLAM HYDROCHLORIDE 2 MG/2ML
INJECTION, SOLUTION INTRAMUSCULAR; INTRAVENOUS AS NEEDED
Status: DISCONTINUED | OUTPATIENT
Start: 2024-07-09 | End: 2024-07-09

## 2024-07-09 RX ORDER — SODIUM CHLORIDE, SODIUM LACTATE, POTASSIUM CHLORIDE, CALCIUM CHLORIDE 600; 310; 30; 20 MG/100ML; MG/100ML; MG/100ML; MG/100ML
INJECTION, SOLUTION INTRAVENOUS CONTINUOUS PRN
Status: DISCONTINUED | OUTPATIENT
Start: 2024-07-09 | End: 2024-07-09

## 2024-07-09 RX ORDER — SENNOSIDES 8.6 MG
650 CAPSULE ORAL EVERY 8 HOURS PRN
Qty: 30 TABLET | Refills: 0 | Status: SHIPPED | OUTPATIENT
Start: 2024-07-09

## 2024-07-09 RX ORDER — LABETALOL HYDROCHLORIDE 5 MG/ML
5 INJECTION, SOLUTION INTRAVENOUS
Status: DISCONTINUED | OUTPATIENT
Start: 2024-07-09 | End: 2024-07-09 | Stop reason: HOSPADM

## 2024-07-09 RX ORDER — COVID-19 ANTIGEN TEST
220 KIT MISCELLANEOUS 2 TIMES DAILY
Qty: 60 CAPSULE | Refills: 0 | Status: SHIPPED | OUTPATIENT
Start: 2024-07-09 | End: 2024-08-08

## 2024-07-09 RX ORDER — TRAMADOL HYDROCHLORIDE 50 MG/1
50 TABLET ORAL EVERY 6 HOURS PRN
Qty: 5 TABLET | Refills: 0 | Status: SHIPPED | OUTPATIENT
Start: 2024-07-09

## 2024-07-09 RX ORDER — BUPIVACAINE HYDROCHLORIDE 5 MG/ML
INJECTION, SOLUTION EPIDURAL; INTRACAUDAL
Status: COMPLETED | OUTPATIENT
Start: 2024-07-09 | End: 2024-07-09

## 2024-07-09 RX ORDER — HYDROMORPHONE HCL/PF 1 MG/ML
0.5 SYRINGE (ML) INJECTION
Status: DISCONTINUED | OUTPATIENT
Start: 2024-07-09 | End: 2024-07-09 | Stop reason: HOSPADM

## 2024-07-09 RX ORDER — PROMETHAZINE HYDROCHLORIDE 25 MG/ML
12.5 INJECTION, SOLUTION INTRAMUSCULAR; INTRAVENOUS ONCE AS NEEDED
Status: DISCONTINUED | OUTPATIENT
Start: 2024-07-09 | End: 2024-07-09 | Stop reason: HOSPADM

## 2024-07-09 RX ORDER — CHLORHEXIDINE GLUCONATE ORAL RINSE 1.2 MG/ML
15 SOLUTION DENTAL ONCE
Status: COMPLETED | OUTPATIENT
Start: 2024-07-09 | End: 2024-07-09

## 2024-07-09 RX ORDER — ONDANSETRON 2 MG/ML
4 INJECTION INTRAMUSCULAR; INTRAVENOUS ONCE AS NEEDED
Status: DISCONTINUED | OUTPATIENT
Start: 2024-07-09 | End: 2024-07-09 | Stop reason: HOSPADM

## 2024-07-09 RX ORDER — ALBUTEROL SULFATE 2.5 MG/3ML
2.5 SOLUTION RESPIRATORY (INHALATION) ONCE AS NEEDED
Status: DISCONTINUED | OUTPATIENT
Start: 2024-07-09 | End: 2024-07-09 | Stop reason: HOSPADM

## 2024-07-09 RX ADMIN — BUPIVACAINE HYDROCHLORIDE 5 ML: 5 INJECTION, SOLUTION EPIDURAL; INTRACAUDAL; PERINEURAL at 11:05

## 2024-07-09 RX ADMIN — SODIUM CHLORIDE, SODIUM LACTATE, POTASSIUM CHLORIDE, AND CALCIUM CHLORIDE 125 ML/HR: .6; .31; .03; .02 INJECTION, SOLUTION INTRAVENOUS at 10:06

## 2024-07-09 RX ADMIN — MIDAZOLAM 2 MG: 1 INJECTION INTRAMUSCULAR; INTRAVENOUS at 11:00

## 2024-07-09 RX ADMIN — SODIUM CHLORIDE, SODIUM LACTATE, POTASSIUM CHLORIDE, AND CALCIUM CHLORIDE: .6; .31; .03; .02 INJECTION, SOLUTION INTRAVENOUS at 13:10

## 2024-07-09 RX ADMIN — CHLORHEXIDINE GLUCONATE 15 ML: 1.2 RINSE ORAL at 10:06

## 2024-07-09 RX ADMIN — FENTANYL CITRATE 50 MCG: 50 INJECTION INTRAMUSCULAR; INTRAVENOUS at 13:14

## 2024-07-09 RX ADMIN — CEFAZOLIN SODIUM 2000 MG: 2 SOLUTION INTRAVENOUS at 13:17

## 2024-07-09 RX ADMIN — PROPOFOL 60 MG: 10 INJECTION, EMULSION INTRAVENOUS at 13:14

## 2024-07-09 RX ADMIN — BUPIVACAINE 20 ML: 13.3 INJECTION, SUSPENSION, LIPOSOMAL INFILTRATION at 11:05

## 2024-07-09 RX ADMIN — PROPOFOL 90 MCG/KG/MIN: 10 INJECTION, EMULSION INTRAVENOUS at 13:16

## 2024-07-09 NOTE — ANESTHESIA PROCEDURE NOTES
Peripheral Block    Patient location during procedure: holding area  Start time: 7/9/2024 10:55 AM  Reason for block: at surgeon's request and post-op pain management  Staffing  Performed by: Dion Bethea MD  Authorized by: Dion Bethea MD    Preanesthetic Checklist  Completed: patient identified, IV checked, site marked, risks and benefits discussed, surgical consent, monitors and equipment checked, pre-op evaluation and timeout performed  Peripheral Block  Patient position: sitting  Prep: ChloraPrep  Patient monitoring: frequent blood pressure checks, continuous pulse oximetry and heart rate  Block type: Supraclavicular  Laterality: right  Injection technique: single-shot  Procedures: ultrasound guided, Ultrasound guidance required for the procedure to increase accuracy and safety of medication placement and decrease risk of complications.  Ultrasound permanent image saved  bupivacaine (PF) (MARCAINE) 0.5 % injection 20 mL - Perineural   5 mL - 7/9/2024 11:05:00 AM  bupivacaine liposomal (EXPAREL) 1.3 % injection 20 mL - Perineural   20 mL - 7/9/2024 11:05:00 AM  Needle  Needle type: Stimuplex   Needle gauge: 20 G  Needle length: 4 in  Needle localization: anatomical landmarks and ultrasound guidance  Needle insertion depth: 4 cm  Assessment  Injection assessment: incremental injection, frequent aspiration, injected with ease, negative aspiration, negative for heart rate change, no paresthesia on injection, no symptoms of intraneural/intravenous injection and needle tip visualized at all times  Paresthesia pain: none  Post-procedure:  site cleaned  patient tolerated the procedure well with no immediate complications

## 2024-07-09 NOTE — ANESTHESIA PREPROCEDURE EVALUATION
Procedure:  Right thumb CMC interpositional arthroplasty (Right: Finger)  Right endoscopic carpal tunnel release (Right: Wrist)    ECG: NSR with nonspecific ST abnormality     Bipolar Lamictal  Obese class I  Hx of thyroid nodule describes slightly impaired vocal cord. Sometimes needs to be propped up on a pillow due to a sensation of difficulty breathing feels due to nodule.     Denies the following: CP/SOB with exertion, asthma, COPD, ISABEL, stroke/TIA, seizure      Relevant Problems   CARDIO   (+) Mixed hyperlipidemia   (+) Pure hypercholesterolemia, unspecified      ENDO   (+) Hypothyroidism   (+) Postoperative hypothyroidism      GI/HEPATIC   (+) Dysphagia      MUSCULOSKELETAL   (+) Lumbar degenerative disc disease   (+) Lumbar spondylosis   (+) Osteoarthritis   (+) Sacroiliitis (HCC)      NEURO/PSYCH   (+) Chronic pain syndrome   (+) Numbness and tingling in both hands        Physical Exam    Airway    Mallampati score: II  TM Distance: >3 FB  Neck ROM: full     Dental   Comment: Missing multiple teeth     Cardiovascular      Pulmonary      Other Findings  post-pubertal.      Anesthesia Plan  ASA Score- 2     Anesthesia Type- regional with ASA Monitors.         Additional Monitors:     Airway Plan:     Comment: Patient seen and examined.  History reviewed.  Patient to be done under TIVA and routine monitors.  Supraclavicular block to be performed preoperatively for post-op pain.  Risks discussed with the patient. Consent obtained..       Plan Factors-Exercise tolerance (METS): >4 METS.    Chart reviewed.   Existing labs reviewed. Patient summary reviewed.    Patient is not a current smoker.      Obstructive sleep apnea risk education given perioperatively.        Induction- intravenous.    Postoperative Plan- Plan for postoperative opioid use.         Informed Consent- Anesthetic plan and risks discussed with patient.  I personally reviewed this patient with the CRNA. Discussed and agreed on the Anesthesia Plan  with the CRNA..

## 2024-07-09 NOTE — H&P
H&P Exam - Orthopedics   Bree Millan 59 y.o. female MRN: [id number]  Unit/Bed#: [facility] 23    Assessment/Plan   Assessment:  Right thumb CMC arthritis, right carpal tunnel syndrome    Plan:  Right thumb CMC interpositional arthroplasty and right endoscopic carpal tunnel release with regional anesthesia    History of Present Illness   HPI:  Bree Millan is a 59 y.o. female who presents with right thumb CMC arthritis.  She has tried conservative treatment at relief of her symptoms.  She would like to proceed with surgical intervention.    Historical Information  Review Of Systems:   Skin: Normal  Neuro: See HPI  Musculoskeletal: See HPI  14 point review of systems negative except as stated above     Past Medical History:   Past Medical History:   Diagnosis Date    Anesthesia     Pt states it takes her a long time to wake    Bipolar 1 disorder (HCC)     Disease of thyroid gland     History of transfusion     Thyroid cancer (HCC) 1987       Past Surgical History:   Past Surgical History:   Procedure Laterality Date    BLADDER NECK RECONSTRUCTION      BLADDER SUSPENSION      EPIDURAL BLOCK INJECTION Left 2/20/2024    Procedure: Left L2-3 and L3-4 transforaminal dural steroid injection;  Surgeon: Alexandra Garcia MD;  Location: [facility];  Service: Pain Management     HYSTERECTOMY      MAMMO STEREOTACTIC BREAST BIOPSY RIGHT (ALL INC) Right 6/2/2022    AR CORRECTION HAMMERTOE Left 6/16/2023    Procedure: REPAIR HAMMERTOE / MALLET TOE / CLAW TOE, L 4, 5 hammertoe repair.;  Surgeon: Miguel Angel Warren DPM;  Location: [facility];  Service: Podiatry    ROTATOR CUFF REPAIR Bilateral     done twice on each side    SHOULDER SURGERY      THYROIDECTOMY, PARTIAL         Family History:  Family history reviewed and non-contributory  Family History   Problem Relation Age of Onset    No Known Problems Mother     No Known Problems Father     No Known Problems Sister     No Known Problems Sister     No Known  Problems Sister     Throat cancer Maternal Grandfather     Breast cancer Maternal Aunt 60    Brain cancer Maternal Aunt     No Known Problems Paternal Aunt        Social History:  Social History     Socioeconomic History    Marital status: /Civil Union     Spouse name: None    Number of children: None    Years of education: None    Highest education level: None   Occupational History    None   Tobacco Use    Smoking status: Former     Current packs/day: 0.00     Types: Cigarettes     Quit date:      Years since quittin.5    Smokeless tobacco: Never   Vaping Use    Vaping status: Never Used   Substance and Sexual Activity    Alcohol use: No    Drug use: No    Sexual activity: Yes   Other Topics Concern    None   Social History Narrative    ** Merged History Encounter **          Social Determinants of Health     Financial Resource Strain: Not on file   Food Insecurity: Not on file   Transportation Needs: Not on file   Physical Activity: Not on file   Stress: Not on file   Social Connections: Not on file   Intimate Partner Violence: Not on file   Housing Stability: Not on file       Allergies:   Allergies   Allergen Reactions    Demerol [Meperidine]     Latex Rash           Labs:  0   Lab Value Date/Time    HCT 44.1 2024 0749    HCT 41.3 2023 0644    HCT 44.3 2021 0629    HGB 14.8 2024 0749    HGB 13.6 2023 0644    HGB 14.5 2021 0629    INR 1.00 10/31/2019 1539    WBC 6.35 2024 0749    WBC 7.94 2023 0644    WBC 7.68 2021 0629       Meds:    Current Facility-Administered Medications:     bupivacaine liposomal (EXPAREL) 1.3 % injection 20 mL, 20 mL, Infiltration, Once, Dion Bethea MD    ceFAZolin (ANCEF) IVPB (premix in dextrose) 2,000 mg 50 mL, 2,000 mg, Intravenous, Once, Dion Shelton PA-C    lactated ringers infusion, 125 mL/hr, Intravenous, Continuous, Renard Negron MD, Last Rate: 125 mL/hr at 24 1006, 125 mL/hr at 24  "1006    Facility-Administered Medications Ordered in Other Encounters:     midazolam (VERSED) injection, , Intravenous, PRN, Dion Bethea MD, 2 mg at 07/09/24 1100    Blood Culture:   No results found for: \"BLOODCX\"    Wound Culture:   No results found for: \"WOUNDCULT\"    Ins and Outs:  No intake/output data recorded.            Physical Exam  /59   Pulse (!) 52   Temp (!) 97.1 °F (36.2 °C) (Temporal)   Resp 18   Ht 5' 8\" (1.727 m)   Wt 95.7 kg (211 lb)   SpO2 99%   BMI 32.08 kg/m²   /59   Pulse (!) 52   Temp (!) 97.1 °F (36.2 °C) (Temporal)   Resp 18   Ht 5' 8\" (1.727 m)   Wt 95.7 kg (211 lb)   SpO2 99%   BMI 32.08 kg/m²   Gen: No acute distress, resting comfortably in bed  HEENT: Eyes clear, moist mucus membranes, hearing intact  Respiratory: No audible wheezing or stridor  Cardiovascular: Well Perfused peripherally, 2+ distal pulse  Abdomen: nondistended, no peritoneal signs  Ortho Exam: right CMC Exam:  No adduction contracture  No hyperextension deformity of MCP joint  Positive localized tenderness over radial and dorsal aspect of thumb (CMC joint)  Grind test is Positive for pain and Positive for crepitus  No triggering or tenderness over the A1 pulley  No pain with Finkelstein’s maneuver         Neuro Exam: Patient is neurovascularly intact from a median, radial and ulnar nerve distribution. Capillary refill less than 2 seconds.       Lab Results: Reviewed  Imaging: Reviewed    "

## 2024-07-09 NOTE — OP NOTE
OPERATIVE REPORT  PATIENT NAME: Bree Millan  :  1965  MRN: [id number]  Pt Location: [facility]    SURGERY DATE: 24    Surgeons and Role:     * Renard Negron MD - Primary     * Dion Shelton PA-C - Assisting    Pre-Op Diagnosis:  Right carpal tunnel syndrome  Right CMC joint of the thumb osteoarthritis    Post-Op Diagnosis:  Right carpal tunnel syndrome  Right thumb CMC joint osteoarthritis    Procedure(s) (LRB):  Right thumb CMC interpositional arthroplasty (Right)  Right endoscopic carpal tunnel release (Right)  Application of short arm thumb spica splint (Right)    Specimen(s):  No specimens collected during this procedure.    Estimated Blood Loss:   Minimal      Anesthesia Type:   Regional with Sedation    Operative Indications:  The patient has a history of right carpal tunnel syndrome and thumb CMC joint osteoarthritis that was recalcitrant to conservative management.  The decision was made to bring the patient to the operating room for right endoscopic carpal tunnel release and right thumb interpositional arthroplasty.  Risks of the procedure were explained which include, but are not limited to bleeding; infection; damage to nerves, arteries,veins, tendons; scar; pain; need for reoperation; failure to give desired result; and risks of anaesthesia.  All questions were answered to satisfaction and they were willing to proceed.         Operative Findings:  Right carpal tunnel syndrome  Right thumb CMC joint osteoarthritis    Complications:   None    Procedure and Technique:  After the patient, site, and procedure were identified, the patient was brought into the operating room in a supine position.  Regional anaesthesia and sedation were provided.  A well padded tourniquet was applied to the extremity, set at 250 mmHg.  The  right upper extremity was then prepped and drapped in a normal, sterile, orthopedic fashion.    After reconfirmation of the patient, site, and surgical procedure,  which was agreed upon by the entire surgical team, attention was turned to the right wrist.  The sites of the proximal and distal incisions were marked.  The shavon of the proximal incision was placed horizontally at the midline of the wrist.  The distal incision shavon was longitudinal extending distally from the point of intersection of the line between the long finger and ring finger and the line along the distal border of the fully abducted thumb.  The proximal incision was performed.  Subcutaneous tissues were dissected.  Then the transverse volar antebrachial fascia was perforated with a scalpel.  The edges of the skin incision where retracted and the forearm fascia was incised for approximately 1.5 cm proximally with care taken to identify and protect the median nerve.  Retractors were used to inspect the transverse carpal ligament distally.  A curved Bush dissector was used to glide under the transverse carpal ligament and superficial to the median nerve with confirmation via the washboard feeling.  Then the curved Bush was pushed into the palm toward the distal incision site.  When the location of the distal skin shavon was adequate, the distal incision was made.  Then with retraction of the skin, further dissection and perforation of the palmar fascia was performed with the use of tenotomy scissors.  The curved Bush was guided from proximal to distal out the distal incisions without any twisting to allow for dilation of the tract.  The curved Bush was removed, and the cannula for the camera was inserted along the same tract, making sure to keep the alignment post on the cannula perpendicular to the plane of the hand without twisting.  Then while keeping the wrist in extension, and holding the cannula of the camera in place, the wrist was placed on the hyperextension board.  The scope was inserted distally, and a cotton-tip applicator was used proximally to clean the tract as well as the scope.  A curved cutting  knife was introduced from proximal to distal while keeping visualization with the use of the camera.  Without twisting of the canula, the knife was used to cut the transverse carpal ligament completely, making sure there were no remnant fibers.  Then after this was accomplished, the hand was removed from the extension block.  Three maneuvers were used to confirm the full release of the transverse carpal ligament.  First, the ease of twisting the trocar of the camera confirmed the release of the ligament.  Second, the curved Bush was introduced to make sure there were no remnant fibers that could be felt palmarly.  Third, the scope was introduced again to visualize that the whole ligament was released proximally to distally.  Additional confirmation of full release included retraction and inspection in the distal and proximal incisions to make sure there were no remnant fibers distally or proximally respectively.     After the patient, site, and procedure were once again identified, attention was turned to the right thumb.  A curvilinear incision was made at the junction of the glaborous and nonglaborous skin extending toward the flexor carpi radialis tendon.  Care was taken to protect the superficial sensory branch of the radial nerve, the radial artery, the median nerve, the palmar cutaneous branch of the median nerve, the flexor carpi radialis tendon, the abductor pollicis longus tendon and the extensor pollicis brevis tendon.  The thenar muscles were elevated off of the thumb metacarpal and an arthrotomy was done at the trapeziometacarpal and scaphotrapezial joint.  The trapezium was removed in its entirety.  Inspection revealed stage 4 (full thickiness loss of the articular cartilage with exposed subchondral bone) arthritis at the level of the metacarpal base, stage 3 (partial thickness loss of the articular cartilage) arthritis at the distal pole of the scaphoid, and inspection of the scaphotrapezoid joint  revealed stage 1 (minimal fraying of the articular surface) arthritis.  At this point, the thumb was held in a reduced position.  Utilizing a 2. FiberWire suture, the base of the flexor carpi radialis was sutured to the insertion of the adductor pollicis longus tendon securing the thumb in this reduced position.  Multiple passes were then made.  A longitudinal stress test was then performed demonstrating no evidence of subsidence or collapse.  The thumb was sitting in a good resting position at the completion of multiple suture passes..  Gelfoam was placed in the remainder of the hole.        At the completion of the procedure, hemostasis was obtained with cautery and direct pressure.  The wounds were copiously irrigated with sterile solution.  The wounds were closed with Vicryl and Prolene.  Sterile dressings were applied, including Xeroform, gauze, tweeners, webril, ACE and Thumb Spica Splint.  Please note, all sponge, needle, and instrument counts were correct prior to closure.  Loupe magnification was utilized.  The patient tolerated the procedure well.     I was present for all critical portions of the procedure., A qualified resident physician was not available., and A physician assistant was required during the procedure for retraction, tissue handling, dissection and suturing.    Patient Disposition:  PACU  and hemodynamically stable    SIGNATURE: Renard Negron MD  DATE: 07/09/24  TIME: 2:11 PM

## 2024-07-10 ENCOUNTER — TELEPHONE (OUTPATIENT)
Dept: OBGYN CLINIC | Facility: HOSPITAL | Age: 59
End: 2024-07-10

## 2024-07-10 NOTE — TELEPHONE ENCOUNTER
Called and spoke to pt she states the nerve block already wore off. Read instructions from AVS to pt. Pt was not taking the tylenol at all. Informed pt that she should be taking it everyday regardless of pain according to AVS. She stated understanding. She was concerned that she would need to take the Tramadol that makes her itchy and was looking for another medication to replace it. She will try the tylenol and naproxen as ordered and call back if pain is not relieved with just the Naproxen and Tylenol.    Pt had surgery 7/9/24  Right thumb CMC interpositional arthroplasty (Right: Finger), Right endoscopic carpal tunnel release (Right: Wrist); Application of short arm thumb spica splint (Right)     According to the AVS:     Naproxen 220 mg two times a day   Tylenol Extended Release 650 mg every 8 hours   Tramadol 1 tab every 6 hours AS needed for pain .  After surgery, we would like you to take naproxen 220 mg one tablet by mouth every 12 hours with food AND Tylenol 650 mg one tablet by mouth every 8 hours (at breakfast, lunch and dinner) for 3-5 days after your surgery. Please take these medication EVERYDAY after surgery for 3-5 days, and not just as needed. You can take these medications at the same time. Taking these medications after surgery will limit your need for prescription pain medication.

## 2024-07-10 NOTE — TELEPHONE ENCOUNTER
Caller: Bree    Doctor: Jamil    Reason for call: Patient stated her nerve block wore off, states she was given Tramadol but asking if there is anything non narcotic she can take?  Asking for a nurse or physician to call her back.     She said just in case she cannot get to the phone you can speak with her  Garb[place].     Call back#: [phone]

## 2024-07-15 ENCOUNTER — OFFICE VISIT (OUTPATIENT)
Dept: OBGYN CLINIC | Facility: CLINIC | Age: 59
End: 2024-07-15

## 2024-07-15 ENCOUNTER — TELEPHONE (OUTPATIENT)
Age: 59
End: 2024-07-15

## 2024-07-15 VITALS — HEIGHT: 68 IN | BODY MASS INDEX: 31.98 KG/M2 | WEIGHT: 211 LBS

## 2024-07-15 DIAGNOSIS — M18.0 ARTHRITIS OF CARPOMETACARPAL (CMC) JOINT OF BOTH THUMBS: Primary | ICD-10-CM

## 2024-07-15 PROCEDURE — 99024 POSTOP FOLLOW-UP VISIT: CPT | Performed by: ORTHOPAEDIC SURGERY

## 2024-07-15 RX ORDER — HYDROCODONE BITARTRATE AND ACETAMINOPHEN 5; 325 MG/1; MG/1
1 TABLET ORAL EVERY 6 HOURS PRN
Qty: 5 TABLET | Refills: 0 | Status: SHIPPED | OUTPATIENT
Start: 2024-07-15

## 2024-07-15 NOTE — TELEPHONE ENCOUNTER
Patient called to refill  Wegovy 0.5 mg  Patient is asking since this is her 3rd month on it does Clau De La Cruz want to increase the dosage. Please notify patient  [facility]

## 2024-07-15 NOTE — PROGRESS NOTES
"Assessment:   S/P Right thumb CMC interpositional arthroplasty - Right, Right endoscopic carpal tunnel release - Right, and Application of short arm thumb spica splint - Right on 7/9/2024    Plan:   Plaster splint was removed today and a new thumb spica fiberglass splint was applied  She was advised to keep it clean and dry  She is advised no lifting with the right upper extremity  She will follow-up as scheduled for removal of the splint and removal of stitches  She was advised to  her [place] at the pharmacy as this was sent this morning.    Follow Up:  As scheduled    To Do Next Visit:  Reevaluation      CHIEF COMPLAINT:  Chief Complaint   Patient presents with    Right Hand - Post-op     Interpositional Arthroplasty & ECTR- 7/9/24         SUBJECTIVE:  Bree Millan is a 59 y.o. female who presents for follow up after Right thumb CMC interpositional arthroplasty - Right, Right endoscopic carpal tunnel release - Right, and Application of short arm thumb spica splint - Right on 7/9/2024.  Today patient has pain and discomfort over her thumb.  She also notes some swelling into her fingers.  She states that the splint is bothering her at the forearm..       PHYSICAL EXAMINATION:  Vital signs: Ht 5' 8\" (1.727 m)   Wt 95.7 kg (211 lb)   BMI 32.08 kg/m²   General: well developed and well nourished, alert, oriented times 3, and appears comfortable  Psychiatric: Normal    MUSCULOSKELETAL EXAMINATION:  Incision: Clean, dry, intact, swelling is present  Range of Motion: As expected  Neurovascular status: Neuro intact, good cap refill  Activity Restrictions: Cast/splint restrictions         STUDIES REVIEWED:  No Studies to review      PROCEDURES PERFORMED:  Procedures  No Procedures performed today    "

## 2024-07-15 NOTE — TELEPHONE ENCOUNTER
Caller: patient     Doctor: Jamil    Reason for call: can patient be squeezed onto the schedule today?    Had sx 7/9. Has extreme swelling and the ridges on the cast are causing a biting pain in her r hand.     If not with Dr Negron or [place] can she go to [facility]?  Thank you    Call back#: [phone]

## 2024-07-16 RX ORDER — SEMAGLUTIDE 1.7 MG/.75ML
INJECTION, SOLUTION SUBCUTANEOUS
Qty: 3 ML | Refills: 0 | Status: SHIPPED | OUTPATIENT
Start: 2024-07-16

## 2024-07-16 RX ORDER — SEMAGLUTIDE 0.5 MG/.5ML
INJECTION, SOLUTION SUBCUTANEOUS
Qty: 2 ML | Refills: 0 | OUTPATIENT
Start: 2024-07-16

## 2024-07-22 ENCOUNTER — OFFICE VISIT (OUTPATIENT)
Dept: OBGYN CLINIC | Facility: CLINIC | Age: 59
End: 2024-07-22

## 2024-07-22 VITALS
HEIGHT: 68 IN | WEIGHT: 213.2 LBS | DIASTOLIC BLOOD PRESSURE: 72 MMHG | BODY MASS INDEX: 32.31 KG/M2 | SYSTOLIC BLOOD PRESSURE: 132 MMHG

## 2024-07-22 DIAGNOSIS — M18.0 ARTHRITIS OF CARPOMETACARPAL (CMC) JOINT OF BOTH THUMBS: Primary | ICD-10-CM

## 2024-07-22 DIAGNOSIS — G56.03 BILATERAL CARPAL TUNNEL SYNDROME: ICD-10-CM

## 2024-07-22 PROCEDURE — 99024 POSTOP FOLLOW-UP VISIT: CPT | Performed by: ORTHOPAEDIC SURGERY

## 2024-07-22 NOTE — PROGRESS NOTES
"Assessment:   S/P Right thumb CMC interpositional arthroplasty - Right, Right endoscopic carpal tunnel release - Right, and Application of short arm thumb spica splint - Right on 7/9/2024    Plan:   The patient's splint was removed today and she was provided with a comfort cool brace.  She should wear this at all times of the day and night except to shower/wash her hands.  A hand therapy referral was placed for the patient today.  At this time, ROM is appropriate.  No lifting or strengthening for another 4 weeks.  Her sutures were removed and Steri-Strips were applied.  She may shower at this time and pat the wounds dry when she is done.  The Steri-Strips will fall off on their own in the shower in a few days.  She should avoid soaking her wounds in a bath or pool.     Follow Up:  4  week(s)    To Do Next Visit:  Re-evaluation.      CHIEF COMPLAINT:  Chief Complaint   Patient presents with    Right Wrist - Post-op     ECTR + Interpositional Arthoplasty - 7/9/24         SUBJECTIVE:  Bree Millan is a 59 y.o. female who presents for follow up after Right thumb CMC interpositional arthroplasty - Right, Right endoscopic carpal tunnel release - Right, and Application of short arm thumb spica splint - Right on 7/9/2024.  Today patient has significant pain and post-operative swelling of the wrist and hand.  She is only using OTC medications at this time.  Her numbness and tingling has gotten significantly better.      PHYSICAL EXAMINATION:  Vital signs: /72   Ht 5' 8\" (1.727 m)   Wt 96.7 kg (213 lb 3.2 oz)   BMI 32.42 kg/m²   General: well developed and well nourished, alert, oriented times 3, and appears comfortable  Psychiatric: Normal    MUSCULOSKELETAL EXAMINATION:  Incision: Clean, dry, intact  Range of Motion: As expected  Neurovascular status: Neuro intact, good cap refill  Activity Restrictions: Restrictions: ROM may commence at this time; no lifting or strengthening for another 4 weeks  Done today: " Sutures out and Steri strips applied      STUDIES REVIEWED:  No Studies to review      PROCEDURES PERFORMED:  Procedures  No Procedures performed today      Scribe Attestation      I,:  Rylee Torres PA-C am acting as a scribe while in the presence of the attending physician.:       I,:  Renard Negron MD personally performed the services described in this documentation    as scribed in my presence.:              Scribe Attestation      I,:  Rylee Torres PA-C am acting as a scribe while in the presence of the attending physician.:       I,:  Renard Negron MD personally performed the services described in this documentation    as scribed in my presence.:

## 2024-07-23 NOTE — PROGRESS NOTES
PT Evaluation     Today's date: 2024  Patient name: Bree Millan  : 1965  MRN: [id number]  Referring provider: Rylee Torres PA-C  Dx:   Encounter Diagnosis     ICD-10-CM    1. Arthritis of carpometacarpal (CMC) joint of both thumbs  M18.0 Ambulatory Referral to PT/OT Hand Therapy      2. Bilateral carpal tunnel syndrome  G56.03 Ambulatory Referral to PT/OT Hand Therapy                     Assessment    Assessment details: Pt is a 60 YO female presenting to PT with pain, decreased AROM, strength and tolerance to activity.  Pt would benefit from skilled intervention to address these issues and maximize overall function.  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  Decrease pain to 0-2/10 in 4-8 weeks to ease ADL and self care            2.  Decrease swelling 0.5 cm in 4-8 weeks            3.  Increase AROM 10-20 degrees in 4 weeks for improved ability to bathe, dress, and assist in functional activity            4.  Improve  strength by 5 lbs in 4 weeks            5.  Provide orthotic for protection, compression for support            6.  Instruct in activity modification for ADL and self care with independence in 4-6 weeks            7.  Instruct in HEP   LT.  Increase functional AROM to assist with independence in 8-12 weeks            2.  [place] with HEP in 4-6 weeks            3.  Increase  strength by 10 lbs in 8 weeks            4. Recreational activities are improved to maximum level in 12 weeks.            5.  ADL performance is improved to maximal level of function in 12 weeks.            6. Ability to RTW by DC        Plan  Patient would benefit from: skilled physical therapy  Planned modality interventions: cryotherapy, ultrasound and thermotherapy: hydrocollator packs    Planned therapy interventions: activity modification, manual therapy, neuromuscular re-education,  therapeutic activities, stretching, strengthening, therapeutic exercise and home exercise program    Frequency: 2x week  Duration in weeks: 4  Treatment plan discussed with: patient    Subjective Evaluation    History of Present Illness  Date of surgery: 2024  Mechanism of injury: Progressive loss of function and increased pain right thumb with numbness and tingling into her fingers.  Pt underwent a right thumb CMC interpositional arthroplasty, and Right endoscopic carpal tunnel release -.  Application of short arm thumb spica splint - Right on 2024.  Today patient has significant pain and post-operative swelling of the wrist and hand.  She is only using OTC medications at this time.  Her numbness and tingling has gotten significantly better.          Patient Goals  Patient goals for therapy: decreased edema, decreased pain, increased motion, increased strength, independence with ADLs/IADLs, return to sport/leisure activities and return to work    Pain  Current pain ratin  At best pain ratin  At worst pain ratin  Quality: burning and throbbing    Hand dominance: right    Treatments  Current treatment: physical therapy      Objective     Static Posture     Comments  Pt wearing comfort cool orthosis full time.   AROM right wrist E/F- 15/15; thumb MP- 0/10; IP- 0/15  Digits- MP- 0/75; PIP- 0/80; DIP- 0/50  Circumference at wrist- 18.5 cm; MPs- 19.5 cm; Thumb P1- 7.2 cm; MF P1- 6.8 cm  Sensation- dulled digits- all at 3.61 [place]  Pt instructed in MNGE, gentle AROM of thumb MP/IP, control of swelling and pain             Precautions: wear Comfort Cool at all times      Manuals             STM 15                                                   Neuro Re-Ed             HP/pulsed biph 15                                      Ther Ex             MNGE 5x                                                                                                                                                                                      Modalities             US --            CP --

## 2024-07-24 ENCOUNTER — EVALUATION (OUTPATIENT)
Facility: CLINIC | Age: 59
End: 2024-07-24
Payer: COMMERCIAL

## 2024-07-24 DIAGNOSIS — M18.0 ARTHRITIS OF CARPOMETACARPAL (CMC) JOINT OF BOTH THUMBS: ICD-10-CM

## 2024-07-24 DIAGNOSIS — G56.03 BILATERAL CARPAL TUNNEL SYNDROME: ICD-10-CM

## 2024-07-24 PROCEDURE — 97110 THERAPEUTIC EXERCISES: CPT | Performed by: PHYSICAL THERAPIST

## 2024-07-24 PROCEDURE — 97140 MANUAL THERAPY 1/> REGIONS: CPT | Performed by: PHYSICAL THERAPIST

## 2024-07-24 PROCEDURE — 97112 NEUROMUSCULAR REEDUCATION: CPT | Performed by: PHYSICAL THERAPIST

## 2024-07-24 PROCEDURE — 97162 PT EVAL MOD COMPLEX 30 MIN: CPT | Performed by: PHYSICAL THERAPIST

## 2024-07-29 ENCOUNTER — OFFICE VISIT (OUTPATIENT)
Facility: CLINIC | Age: 59
End: 2024-07-29
Payer: COMMERCIAL

## 2024-07-29 ENCOUNTER — TELEPHONE (OUTPATIENT)
Age: 59
End: 2024-07-29

## 2024-07-29 DIAGNOSIS — G56.03 BILATERAL CARPAL TUNNEL SYNDROME: ICD-10-CM

## 2024-07-29 DIAGNOSIS — M18.0 ARTHRITIS OF CARPOMETACARPAL (CMC) JOINT OF BOTH THUMBS: Primary | ICD-10-CM

## 2024-07-29 PROCEDURE — 97140 MANUAL THERAPY 1/> REGIONS: CPT | Performed by: PHYSICAL THERAPIST

## 2024-07-29 PROCEDURE — 97112 NEUROMUSCULAR REEDUCATION: CPT | Performed by: PHYSICAL THERAPIST

## 2024-07-29 PROCEDURE — 97110 THERAPEUTIC EXERCISES: CPT | Performed by: PHYSICAL THERAPIST

## 2024-07-29 NOTE — PROGRESS NOTES
Daily Note     Today's date: 2024  Patient name: Bree Millan  : 1965  MRN: [id number]  Referring provider: Rylee Torres PA-C  Dx:   Encounter Diagnosis     ICD-10-CM    1. Arthritis of carpometacarpal (CMC) joint of both thumbs  M18.0       2. Bilateral carpal tunnel syndrome  G56.03                      Subjective: pt very apprehensive with moving her wrist, thumb and fingers.  PT encouraged to begin moving gently.        Objective: See treatment diary below    Pt very apprehensive with moving wrist, thumb and digits.  PT encouraged to begin and gently increase.  Pt wearing comfort cool orthosis full time.   AROM right wrist E/F- 15/15; thumb MP- 0/10; IP- 0/15  Digits- MP- 0/75; PIP- 0/80; DIP- 0/50  Circumference at wrist- 18.5 cm; MPs- 19.5 cm; Thumb P1- 7.2 cm; MF P1- 6.8 cm  Sensation- dulled digits- all at 3.61 [place]  Pt instructed in MNGE, gentle AROM of thumb MP/IP, control of swelling and pain     Assessment: Tolerated treatment well. Patient would benefit from continued PT      Plan: Continue per plan of care.      Precautions: wear Comfort Cool at all times      Manuals            STM 15 15           AAROM W/T/D                                       Neuro Re-Ed             HP/pulsed biph 15 15                                     Ther Ex             MNGE 5x 5x           AROM W  --> 2/10          TGE  --> 5x          Thumb all  --> 5x                                                                                                                                            Modalities             US -- -->           CP -- 15

## 2024-07-29 NOTE — TELEPHONE ENCOUNTER
Caller: Bree    Doctor: Jamil    Reason for call: Patient is going through PT, she is concerned as the PT is not working the 4 fingers. PT is working the thumb and it is very painful with stinging stabbing pain.  Is this normal or should I be concerned  Please advise   Thank you  Call back#: [phone]

## 2024-07-31 ENCOUNTER — OFFICE VISIT (OUTPATIENT)
Facility: CLINIC | Age: 59
End: 2024-07-31
Payer: COMMERCIAL

## 2024-07-31 DIAGNOSIS — M18.0 ARTHRITIS OF CARPOMETACARPAL (CMC) JOINT OF BOTH THUMBS: Primary | ICD-10-CM

## 2024-07-31 DIAGNOSIS — G56.03 BILATERAL CARPAL TUNNEL SYNDROME: ICD-10-CM

## 2024-07-31 PROCEDURE — 97112 NEUROMUSCULAR REEDUCATION: CPT

## 2024-07-31 PROCEDURE — 97110 THERAPEUTIC EXERCISES: CPT

## 2024-07-31 PROCEDURE — 97140 MANUAL THERAPY 1/> REGIONS: CPT

## 2024-07-31 PROCEDURE — 97035 APP MDLTY 1+ULTRASOUND EA 15: CPT

## 2024-07-31 NOTE — PROGRESS NOTES
"Daily Note     Today's date: 2024  Patient name: Bree Millan  : 1965  MRN: [id number]  Referring provider: Rylee Torres PA-C  Dx:   Encounter Diagnosis     ICD-10-CM    1. Arthritis of carpometacarpal (CMC) joint of both thumbs  M18.0       2. Bilateral carpal tunnel syndrome  G56.03                      Subjective: Pt reports \"my thumb is painful and numb in the hand\"      Objective: See treatment diary below    Pt very apprehensive with moving wrist, thumb and digits.  PT encouraged to begin and gently increase.  Pt wearing comfort cool orthosis full time.   AROM right wrist E/F- 15/15; thumb MP- 0/10; IP- 0/15  Digits- MP- 0/75; PIP- 0/80; DIP- 0/50  Circumference at wrist- 18.5 cm; MPs- 19.5 cm; Thumb P1- 7.2 cm; MF P1- 6.8 cm  Sensation- dulled digits- all at 3.61 [place]  Pt instructed in MNGE, gentle AROM of thumb MP/IP, control of swelling and pain     Assessment: Tolerated treatment well. Patient would benefit from continued PT. Pt initiated with AROM today with minimal complaints.      Plan: Progress treatment as tolerated.       Precautions: wear Comfort Cool at all times    Manuals           STM 15 15 15          AAROM W/T/D                                      Neuro Re-Ed             HP/pulsed biph 15 15 15                                    Ther Ex             MNGE 5x 5x 5x          AROM W  --> 2/10          TGE  --> 5x          Thumb all  --> 5x                                                                                                                                            Modalities             US -- --> 15          CP -- 15 DEF                 "

## 2024-08-05 ENCOUNTER — OFFICE VISIT (OUTPATIENT)
Facility: CLINIC | Age: 59
End: 2024-08-05
Payer: COMMERCIAL

## 2024-08-05 DIAGNOSIS — M18.0 ARTHRITIS OF CARPOMETACARPAL (CMC) JOINT OF BOTH THUMBS: Primary | ICD-10-CM

## 2024-08-05 PROCEDURE — 97112 NEUROMUSCULAR REEDUCATION: CPT

## 2024-08-05 PROCEDURE — 97110 THERAPEUTIC EXERCISES: CPT

## 2024-08-05 PROCEDURE — 97140 MANUAL THERAPY 1/> REGIONS: CPT

## 2024-08-05 PROCEDURE — 97035 APP MDLTY 1+ULTRASOUND EA 15: CPT

## 2024-08-05 NOTE — PROGRESS NOTES
"Daily Note     Today's date: 2024  Patient name: Bree Millan  : 1965  MRN: [id number]  Referring provider: Rylee Torres PA-C  Dx:   Encounter Diagnosis     ICD-10-CM    1. Arthritis of carpometacarpal (CMC) joint of both thumbs  M18.0                      Subjective: Pt reports the thumb \"is not worse even with the new exercises\". \"Just tired of not doing anything around the house\"      Objective: See treatment diary below    Pt very apprehensive with moving wrist, thumb and digits.  PT encouraged to begin and gently increase.  Pt wearing comfort cool orthosis full time.   AROM right wrist E/F- 15/15; thumb MP- 0/10; IP- 0/15  Digits- MP- 0/75; PIP- 0/80; DIP- 0/50  Circumference at wrist- 18.5 cm; MPs- 19.5 cm; Thumb P1- 7.2 cm; MF P1- 6.8 cm  Sensation- dulled digits- all at 3.61 [place]  Pt instructed in MNGE, gentle AROM of thumb MP/IP, control of swelling and pain  Assessment: Tolerated treatment fair. Patient exhibited good technique with therapeutic exercises and would benefit from continued PT      Plan: Progress treatment as tolerated.       Precautions: wear Comfort Cool at all times    Manuals          STM 15 15 15 15         AAROM W/T/D                                     Neuro Re-Ed             HP/pulsed biph 15 15 15 15                                   Ther Ex             MNGE 5x 5x 5x 5x         AROM W  --> 2/10 2x10         TGE  --> 5x 5x         Thumb all  --> 5x 5x                                                                                                                                           Modalities             US -- --> 15 15         CP -- 15 DEF DEF                     "

## 2024-08-07 ENCOUNTER — TELEPHONE (OUTPATIENT)
Dept: OBGYN CLINIC | Facility: HOSPITAL | Age: 59
End: 2024-08-07

## 2024-08-07 ENCOUNTER — APPOINTMENT (OUTPATIENT)
Facility: CLINIC | Age: 59
End: 2024-08-07
Payer: COMMERCIAL

## 2024-08-07 NOTE — TELEPHONE ENCOUNTER
Caller: [facility]     Doctor: Jamil    Reason for call: Calling to confirm if patient has a follow up scheduled.    Call back#: [phone]

## 2024-08-12 ENCOUNTER — OFFICE VISIT (OUTPATIENT)
Facility: CLINIC | Age: 59
End: 2024-08-12
Payer: COMMERCIAL

## 2024-08-12 DIAGNOSIS — M18.0 ARTHRITIS OF CARPOMETACARPAL (CMC) JOINT OF BOTH THUMBS: Primary | ICD-10-CM

## 2024-08-12 PROCEDURE — 97035 APP MDLTY 1+ULTRASOUND EA 15: CPT

## 2024-08-12 PROCEDURE — 97110 THERAPEUTIC EXERCISES: CPT

## 2024-08-12 PROCEDURE — 97140 MANUAL THERAPY 1/> REGIONS: CPT

## 2024-08-12 PROCEDURE — 97112 NEUROMUSCULAR REEDUCATION: CPT

## 2024-08-12 NOTE — PROGRESS NOTES
"Daily Note     Today's date: 2024  Patient name: Bree Millan  : 1965  MRN: [id number]  Referring provider: Rylee Torres PA-C  Dx:   Encounter Diagnosis     ICD-10-CM    1. Arthritis of carpometacarpal (CMC) joint of both thumbs  M18.0                      Subjective: Pt reports \"this thumb recovery is not fast enough for me.\"      Objective: See treatment diary below  t very apprehensive with moving wrist, thumb and digits.  PT encouraged to begin and gently increase.  Pt wearing comfort cool orthosis full time.   AROM right wrist E/F- 15/15; thumb MP- 0/10; IP- 0/15  Digits- MP- 0/75; PIP- 0/80; DIP- 0/50  Circumference at wrist- 18.5 cm; MPs- 19.5 cm; Thumb P1- 7.2 cm; MF P1- 6.8 cm  Sensation- dulled digits- all at 3.61 [place]  Pt instructed in MNGE, gentle AROM of thumb MP/IP, control of swelling and pain    Assessment: Tolerated treatment fair. Patient exhibited good technique with therapeutic exercises and would benefit from continued PT      Plan: Progress treatment as tolerated.       Precautions: wear Comfort Cool at all times    Manuals         STM 15 15 15 15 15        AAROM W/T/D                                    Neuro Re-Ed             HP/pulsed biph 15 15 15 15 15                                  Ther Ex             MNGE 5x 5x 5x 5x 5x        AROM W  --> 2/10 2x10 x2x10        TGE  --> 5x 5x 5x        Thumb all  --> 5x 5x 5x                                                                                                                                          Modalities             US -- --> 15 15 15        CP -- 15 DEF DEF DEF                      "

## 2024-08-13 NOTE — TELEPHONE ENCOUNTER
Reason for call:   [x] Refill   [] Prior Auth  [] Other:     Office:   [x] PCP/Provider -   [] Specialty/Provider -     Medication: Semaglutide-Weight Management (Wegovy) 1.7 MG/0.75ML once weekly       Pharmacy: [facility]     Does the patient have enough for 3 days?   [x] Yes   [] No - Send as HP to POD

## 2024-08-14 ENCOUNTER — OFFICE VISIT (OUTPATIENT)
Facility: CLINIC | Age: 59
End: 2024-08-14
Payer: COMMERCIAL

## 2024-08-14 DIAGNOSIS — M18.0 ARTHRITIS OF CARPOMETACARPAL (CMC) JOINT OF BOTH THUMBS: Primary | ICD-10-CM

## 2024-08-14 PROCEDURE — 97110 THERAPEUTIC EXERCISES: CPT | Performed by: PHYSICAL THERAPIST

## 2024-08-14 PROCEDURE — 97035 APP MDLTY 1+ULTRASOUND EA 15: CPT | Performed by: PHYSICAL THERAPIST

## 2024-08-14 PROCEDURE — 97112 NEUROMUSCULAR REEDUCATION: CPT | Performed by: PHYSICAL THERAPIST

## 2024-08-14 PROCEDURE — 97140 MANUAL THERAPY 1/> REGIONS: CPT | Performed by: PHYSICAL THERAPIST

## 2024-08-14 RX ORDER — SEMAGLUTIDE 1.7 MG/.75ML
INJECTION, SOLUTION SUBCUTANEOUS
Qty: 3 ML | Refills: 0 | OUTPATIENT
Start: 2024-08-14

## 2024-08-14 RX ORDER — SEMAGLUTIDE 2.4 MG/.75ML
INJECTION, SOLUTION SUBCUTANEOUS
Qty: 3 ML | Refills: 0 | Status: SHIPPED | OUTPATIENT
Start: 2024-08-14

## 2024-08-14 NOTE — PROGRESS NOTES
Daily Note     Today's date: 2024  Patient name: Bree Millan  : 1965  MRN: [id number]  Referring provider: Rylee Torres PA-C  Dx:   Encounter Diagnosis     ICD-10-CM    1. Arthritis of carpometacarpal (CMC) joint of both thumbs  M18.0                      Subjective: pt noting continued swelling, soreness and limitation in function in her thumb and IF.  PT stressed slow, steady recovery with improvement in AROM noted.      Objective: See treatment diary below    Pt very apprehensive with moving wrist, thumb and digits.  PT encouraged to begin and gently increase.  Pt wearing comfort cool orthosis full time.   AROM right wrist E/F- 60/60; thumb MP- 0/30; IP- 0/45  Digits- MP- 0/75; PIP- 0/80; DIP- 0/50  Circumference at wrist- 18.5 cm; MPs- 19.5 cm; Thumb P1- 7.2 cm; MF P1- 6.8 cm  Sensation- dulled digits- all at 3.61 [place]  Pt instructed in MNGE, gentle AROM of thumb MP/IP, control of swelling and pain     Assessment: Tolerated treatment well. Patient would benefit from continued PT      Plan: Continue per plan of care.      Precautions: wear Comfort Cool at all times    Manuals        STM 15 15 15 15 15 10       AAROM W/T/D   8                                 Neuro Re-Ed             HP/pulsed biph 15 15 15 15 15 15                                 Ther Ex             MNGE 5x 5x 5x 5x 5x 5x       AROM W  --> 210 2x10 2x10 210       TGE  --> 5x 5x 5x 5x       Thumb all  --> 5x 5x 5x 5x       Nuts/bolts      1'       Small ball roll      30/30       Square blocks 3JC/Key      8ea                                                                                                  Modalities             US -- --> 15 15 15 15       CP -- 15 DEF DEF DEF def

## 2024-08-19 ENCOUNTER — OFFICE VISIT (OUTPATIENT)
Facility: CLINIC | Age: 59
End: 2024-08-19
Payer: COMMERCIAL

## 2024-08-19 DIAGNOSIS — M18.0 ARTHRITIS OF CARPOMETACARPAL (CMC) JOINT OF BOTH THUMBS: Primary | ICD-10-CM

## 2024-08-19 DIAGNOSIS — G56.03 BILATERAL CARPAL TUNNEL SYNDROME: ICD-10-CM

## 2024-08-19 PROCEDURE — 97140 MANUAL THERAPY 1/> REGIONS: CPT | Performed by: PHYSICAL THERAPIST

## 2024-08-19 PROCEDURE — 97035 APP MDLTY 1+ULTRASOUND EA 15: CPT | Performed by: PHYSICAL THERAPIST

## 2024-08-19 PROCEDURE — 97110 THERAPEUTIC EXERCISES: CPT | Performed by: PHYSICAL THERAPIST

## 2024-08-19 PROCEDURE — 97112 NEUROMUSCULAR REEDUCATION: CPT | Performed by: PHYSICAL THERAPIST

## 2024-08-19 NOTE — PROGRESS NOTES
PT Re-Evaluation     Today's date: 2024  Patient name: Bree Millan  : 1965  MRN: [id number]  Referring provider: Rylee Torres PA-C  Dx:   Encounter Diagnosis     ICD-10-CM    1. Arthritis of carpometacarpal (CMC) joint of both thumbs  M18.0       2. Bilateral carpal tunnel syndrome  G56.03                      Assessment    Assessment details: Pt is a 60 YO female presenting to PT with pain, decreased AROM, strength and tolerance to activity.  Pt would benefit from skilled intervention to address these issues and maximize overall function.  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  She is now 6 weeks post surgery.  She notes minimal tingling, but irritability along her CMC/CT area.  Pt has avoided resistive activity/exercise, stressed ab/add/opposition.      Goals    ST.  Decrease pain to 0-2/10 in 4-8 weeks to ease ADL and self care            2.  Decrease swelling 0.5 cm in 4-8 weeks            3.  Increase AROM 10-20 degrees in 4 weeks for improved ability to bathe, dress, and assist in functional activity            4.  Improve  strength by 5 lbs in 4 weeks            5.  Provide orthotic for protection, compression for support            6.  Instruct in activity modification for ADL and self care with independence in 4-6 weeks            7.  Instruct in HEP   LT.  Increase functional AROM to assist with independence in 8-12 weeks            2.  [place] with HEP in 4-6 weeks            3.  Increase  strength by 10 lbs in 8 weeks            4. Recreational activities are improved to maximum level in 12 weeks.            5.  ADL performance is improved to maximal level of function in 12 weeks.            6. Ability to RTW by DC        Plan  Patient would benefit from: skilled physical therapy  Planned modality interventions: cryotherapy, ultrasound and  thermotherapy: hydrocollator packs    Planned therapy interventions: activity modification, manual therapy, neuromuscular re-education, therapeutic activities, stretching, strengthening, therapeutic exercise and home exercise program    Frequency: 2x week  Duration in weeks: 4  Treatment plan discussed with: patient    Subjective Evaluation    History of Present Illness  Date of surgery: 2024  Mechanism of injury: Progressive loss of function and increased pain right thumb with numbness and tingling into her fingers.  Pt underwent a right thumb CMC interpositional arthroplasty, and Right endoscopic carpal tunnel release -.  Application of short arm thumb spica splint - Right on 2024.  Today patient has significant pain and post-operative swelling of the wrist and hand.  She is only using OTC medications at this time.  Her numbness and tingling has gotten significantly better.          Patient Goals  Patient goals for therapy: decreased edema, decreased pain, increased motion, increased strength, independence with ADLs/IADLs, return to sport/leisure activities and return to work    Pain  Current pain ratin  At best pain ratin  At worst pain rating: 3  Quality: burning and throbbing    Hand dominance: right    Treatments  Current treatment: physical therapy      Objective     Static Posture     Comments  Pt wearing comfort cool orthosis full time.   AROM right wrist E/F- 55/60; thumb MP- 0/15; IP- 0/40  Digits- MP- 0/75; PIP- 0/80; DIP- 0/50  Circumference at wrist- 16.5 cm; MPs- 18.5 cm; Thumb P1- 7.2 cm; MF P1- 6.5 cm  Sensation- dulled digits- all at 3.61 [place]  Pt instructed in MNGE, gentle AROM of thumb, control of swelling and pain  Completing light functional activity               Precautions: wear Comfort Cool at all times    Manuals          STM 15 15 15 15 15 10  10         SORAYA W/T/D    8  8                                                          Neuro Re-Ed                       HP/pulsed biph 15 15 15 15 15 15  15                                                         Ther Ex                       MNGE 5x 5x 5x 5x 5x 5x  5x         AROM W   --> 2/10 2x10 2x10 2/10  2/10         TGE   --> 5x 5x 5x 5x  5x         Thumb all   --> 5x 5x 5x 5x  5x         Nuts/bolts           1'  1'         Small ball roll           30/30  30/30         Square blocks 3JC/Key           8 ea  8 ea                                                                                                                                                                                 Modalities                       US -- --> 15 15 15 15  15         CP -- 15 DEF DEF DEF def  def

## 2024-08-21 ENCOUNTER — APPOINTMENT (OUTPATIENT)
Facility: CLINIC | Age: 59
End: 2024-08-21
Payer: COMMERCIAL

## 2024-08-26 ENCOUNTER — OFFICE VISIT (OUTPATIENT)
Dept: OBGYN CLINIC | Facility: CLINIC | Age: 59
End: 2024-08-26

## 2024-08-26 VITALS — WEIGHT: 213 LBS | BODY MASS INDEX: 32.28 KG/M2 | HEIGHT: 68 IN

## 2024-08-26 DIAGNOSIS — Z98.890 S/P MUSCULOSKELETAL SYSTEM SURGERY: Primary | ICD-10-CM

## 2024-08-26 DIAGNOSIS — Z98.890 S/P ENDOSCOPIC CARPAL TUNNEL RELEASE: ICD-10-CM

## 2024-08-26 PROCEDURE — 99024 POSTOP FOLLOW-UP VISIT: CPT | Performed by: ORTHOPAEDIC SURGERY

## 2024-08-26 NOTE — PROGRESS NOTES
"Assessment:   Approx. 8 weeks S/P Right thumb CMC interpositional arthroplasty - Right, Right endoscopic carpal tunnel release - Right, and Application of short arm thumb spica splint - Right on 7/9/2024    Plan:   Numbness and tingling has resolved. She has no pain with CMC circumduction. She does have some stiffness to her hand. She may increase her activities. She has no restrictions with regards to therapy at this time. She may wish to continue to attend formal therapy vs stopping formal therapy and using her right hand for normal everyday activities. She wishes to stop formal therapy and use her hand normally at this time. She may continue the use of the comfort cool brace on an as needed basis.     Follow Up:  6  week(s)    To Do Next Visit:  Re-evaluation       CHIEF COMPLAINT:  Chief Complaint   Patient presents with    Right Wrist - Post-op     ECTR + Interpositional Arthoplasty - 7/9/24         SUBJECTIVE:  Bree Millan is a 59 y.o. female who presents for follow up after Right thumb CMC interpositional arthroplasty - Right, Right endoscopic carpal tunnel release - Right, and Application of short arm thumb spica splint - Right on 7/9/2024.  Today patient has Stiffness/LROM.       PHYSICAL EXAMINATION:  Vital signs: Ht 5' 8\" (1.727 m)   Wt 96.6 kg (213 lb)   BMI 32.39 kg/m²   General: well developed and well nourished, alert, oriented times 3, and appears comfortable  Psychiatric: Normal    MUSCULOSKELETAL EXAMINATION:  Incision: healed  Range of Motion: Limited due to stiffness and full composite fist possible  Neurovascular status: Neuro intact, good cap refill  Activity Restrictions: No restrictions        STUDIES REVIEWED:  No Studies to review      PROCEDURES PERFORMED:  Procedures  No Procedures performed today  Scribe Attestation      I,:  Cindy Monte MA am acting as a scribe while in the presence of the attending physician.:       I,:  Renard Negron MD personally performed the " services described in this documentation    as scribed in my presence.:

## 2024-08-28 ENCOUNTER — APPOINTMENT (OUTPATIENT)
Facility: CLINIC | Age: 59
End: 2024-08-28
Payer: COMMERCIAL

## 2024-09-11 RX ORDER — SEMAGLUTIDE 2.4 MG/.75ML
INJECTION, SOLUTION SUBCUTANEOUS
Qty: 3 ML | Refills: 0 | Status: SHIPPED | OUTPATIENT
Start: 2024-09-11

## 2024-10-07 ENCOUNTER — TELEPHONE (OUTPATIENT)
Dept: OBGYN CLINIC | Facility: CLINIC | Age: 59
End: 2024-10-07

## 2024-10-07 ENCOUNTER — OFFICE VISIT (OUTPATIENT)
Dept: OBGYN CLINIC | Facility: CLINIC | Age: 59
End: 2024-10-07

## 2024-10-07 VITALS — HEIGHT: 68 IN | BODY MASS INDEX: 29.7 KG/M2 | WEIGHT: 196 LBS

## 2024-10-07 DIAGNOSIS — M18.0 ARTHRITIS OF CARPOMETACARPAL (CMC) JOINT OF BOTH THUMBS: Primary | ICD-10-CM

## 2024-10-07 PROCEDURE — 99024 POSTOP FOLLOW-UP VISIT: CPT | Performed by: ORTHOPAEDIC SURGERY

## 2024-10-07 NOTE — LETTER
October 7, 2024     Patient: Bree Millan  YOB: 1965  Date of Visit: 10/7/2024      To Whom it May Concern:    Bree Millan is under my professional care. Bree was seen in my office on 10/7/2024. Bree can return to work without restrictions beginning 10/8/2024.     If you have any questions or concerns, please don't hesitate to call.         Sincerely,          Renard Negron MD        CC: No Recipients

## 2024-10-07 NOTE — PROGRESS NOTES
"Assessment:   Approx. 8 weeks S/P Right thumb CMC interpositional arthroplasty - Right, Right endoscopic carpal tunnel release - Right, and Application of short arm thumb spica splint - Right on 7/9/2024    Plan:   Patient was advised to use her hand as tolerated for activities of daily living  She was given a work note today that she can return to work without restrictions  She was advised to continue with her home exercise program at this time  She will follow-up in 2 months for reevaluation    Follow Up:  8 weeks    To Do Next Visit:  Re-evaluation       CHIEF COMPLAINT:  Chief Complaint   Patient presents with    Right Wrist - Post-op     ECTR + Interpositional Arthoplasty - 7/9/24         SUBJECTIVE:  Bree Millan is a 59 y.o. female who presents for follow up after Right thumb CMC interpositional arthroplasty - Right, Right endoscopic carpal tunnel release - Right, and Application of short arm thumb spica splint - Right on 7/9/2024.  Today patient has some pain and discomfort noted at the base of her thumb.      PHYSICAL EXAMINATION:  Vital signs: Ht 5' 8\" (1.727 m)   Wt 88.9 kg (196 lb)   BMI 29.80 kg/m²   General: well developed and well nourished, alert, oriented times 3, and appears comfortable  Psychiatric: Normal    MUSCULOSKELETAL EXAMINATION:  Incision: healed  Range of Motion: Near full range of motion of the thumb with circumduction  Neurovascular status: Neuro intact, good cap refill  Activity Restrictions: No restrictions        STUDIES REVIEWED:  No Studies to review      PROCEDURES PERFORMED:  Procedures  No Procedures performed today          "

## 2024-10-08 RX ORDER — SEMAGLUTIDE 2.4 MG/.75ML
INJECTION, SOLUTION SUBCUTANEOUS
Qty: 3 ML | Refills: 0 | Status: SHIPPED | OUTPATIENT
Start: 2024-10-08

## 2024-11-07 RX ORDER — SEMAGLUTIDE 2.4 MG/.75ML
INJECTION, SOLUTION SUBCUTANEOUS
Qty: 3 ML | Refills: 0 | Status: SHIPPED | OUTPATIENT
Start: 2024-11-07

## 2024-11-07 NOTE — TELEPHONE ENCOUNTER
LVM notifying patient that this medication refill was completed and to call us with any further concerns or questions.

## 2024-12-06 RX ORDER — SEMAGLUTIDE 2.4 MG/.75ML
INJECTION, SOLUTION SUBCUTANEOUS
Qty: 3 ML | Refills: 0 | Status: SHIPPED | OUTPATIENT
Start: 2024-12-06

## 2025-01-07 RX ORDER — SEMAGLUTIDE 2.4 MG/.75ML
INJECTION, SOLUTION SUBCUTANEOUS
Qty: 3 ML | Refills: 0 | Status: SHIPPED | OUTPATIENT
Start: 2025-01-07

## 2025-01-20 ENCOUNTER — OFFICE VISIT (OUTPATIENT)
Dept: OBGYN CLINIC | Facility: CLINIC | Age: 60
End: 2025-01-20
Payer: COMMERCIAL

## 2025-01-20 VITALS — BODY MASS INDEX: 29.7 KG/M2 | WEIGHT: 196 LBS | HEIGHT: 68 IN

## 2025-01-20 DIAGNOSIS — M18.0 ARTHRITIS OF CARPOMETACARPAL (CMC) JOINT OF BOTH THUMBS: Primary | ICD-10-CM

## 2025-01-20 PROCEDURE — 99213 OFFICE O/P EST LOW 20 MIN: CPT | Performed by: ORTHOPAEDIC SURGERY

## 2025-01-20 NOTE — ASSESSMENT & PLAN NOTE
S/p right thumb interpositional arthroplasty and right and right endoscopic carpal tunnel release performed 7/9/2024  It was discussed with the patient that she can continue to use her hand as tolerated for activities of daily living  She is advised to continue to work on range of motion and strengthening  She would like to proceed with left thumb CMC interpositional arthroplasty and left endoscopic carpal tunnel release in the fall  We will have her follow-up in 4-5 months for reevaluation

## 2025-01-20 NOTE — PROGRESS NOTES
ORTHOPAEDIC HAND, WRIST, AND ELBOW OFFICE  VISIT     Name: Bree Millan      : 1965      MRN: [id number]  Encounter Provider: Renard Negron MD  Encounter Date: 2025   Encounter department: [facility]  :  Assessment & Plan  Arthritis of carpometacarpal (CMC) joint of both thumbs  S/p right thumb interpositional arthroplasty and right and right endoscopic carpal tunnel release performed 2024  It was discussed with the patient that she can continue to use her hand as tolerated for activities of daily living  She is advised to continue to work on range of motion and strengthening  She would like to proceed with left thumb CMC interpositional arthroplasty and left endoscopic carpal tunnel release in the fall  We will have her follow-up in 4-5 months for reevaluation               History of Present Illness   HPI  Chief Complaint   Patient presents with   • Right Wrist - Post-op     ECTR + Interpositional Arthoplasty - 24       Bree Millan is a 59 y.o. female who presents for follow-up regarding right endoscopic carpal tunnel release and right interpositional arthroplasty.  She has been doing well with minimal complaints.  She states the numbness and tingling improved in her hand.  She is able to use her hand as tolerated for activities of daily living.  She states in the fall time she would like to proceed with her left side carpal tunnel release and interpositional arthroplasty.      REVIEW OF SYSTEMS:  General: no fever, no chills  HEENT:  No loss of hearing or eyesight problems  Eyes:  No red eyes  Respiratory:  No coughing, shortness of breath or wheezing  Cardiovascular:  No chest pain, no palpitations  GI:  Abdomen soft nontender, denies nausea  Endocrine:  No muscle weakness, no frequent urination, no excessive thirst  Urinary:  No dysuria, no incontinence  Musculoskeletal: see HPI and PE  SKIN:  No skin rash, no dry  "skin  Neurological:  No headaches, no confusion  Psychiatric:  No suicide thoughts, no anxiety, no depression  Review of all other systems is negative    Objective   Ht 5' 8\" (1.727 m)   Wt 88.9 kg (196 lb)   BMI 29.80 kg/m²      General: well developed and well nourished, alert, oriented times 3, and appears comfortable  Psychiatric: Normal  HEENT: Trachea Midline, No torticollis  Cardiovascular: No discernable arrhythmia  Pulmonary: No wheezing or stridor  Abdomen: No rebound or guarding  Extremities: No peripheral edema  Skin: No masses, erythema, lacerations, fluctation, ulcerations  Neurovascular: Sensation Intact to the Median, Ulnar, Radial Nerve, Motor Intact to the Median, Ulnar, Radial Nerve, and Pulses Intact    Musculoskeletal exam:  Left Carpal Tunnel Exam:    Negative thenar atrophy. Negative phalen's test. Positive carpal tunnel compression. Positive tinels over median nerve at the wrist.  Opposition strength 5/5.  Abduction strength 5/5.      left CMC Exam:  No adduction contracture  No hyperextension deformity of MCP joint  Positive localized tenderness over radial and dorsal aspect of thumb (CMC joint)  Grind test is Positive for pain and Positive for crepitus  No triggering or tenderness over the A1 pulley  No pain with Finkelstein’s maneuver     Right hand  Incisions are well-healed with no signs of infection  She has full range of motion of all of her digits  She has no pain of the over the thumb with circumduction                STUDIES REVIEWED:  No Studies to review      PROCEDURES PERFORMED:  Procedures  No Procedures performed today      Scribe Attestation    I,:  Dion Shelton PA-C am acting as a scribe while in the presence of the attending physician.:       I,:  Renard Negron MD personally performed the services described in this documentation    as scribed in my presence.:           "

## 2025-01-29 DIAGNOSIS — E89.0 POSTOPERATIVE HYPOTHYROIDISM: ICD-10-CM

## 2025-01-29 RX ORDER — LEVOTHYROXINE SODIUM 100 MCG
100 TABLET ORAL DAILY
Qty: 90 TABLET | Refills: 1 | Status: SHIPPED | OUTPATIENT
Start: 2025-01-29

## 2025-01-31 RX ORDER — SEMAGLUTIDE 2.4 MG/.75ML
INJECTION, SOLUTION SUBCUTANEOUS
Qty: 3 ML | Refills: 0 | Status: SHIPPED | OUTPATIENT
Start: 2025-01-31

## 2025-03-04 ENCOUNTER — TELEPHONE (OUTPATIENT)
Age: 60
End: 2025-03-04

## 2025-03-04 NOTE — TELEPHONE ENCOUNTER
Pt called and is interested in getting the shingles shot. Can an order please be placed and the pt  called back. 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      is Gabriel- [phone]    Thanks.

## 2025-03-05 RX ORDER — SEMAGLUTIDE 2.4 MG/.75ML
INJECTION, SOLUTION SUBCUTANEOUS
Qty: 3 ML | Refills: 0 | Status: SHIPPED | OUTPATIENT
Start: 2025-03-05

## 2025-04-01 RX ORDER — SEMAGLUTIDE 2.4 MG/.75ML
INJECTION, SOLUTION SUBCUTANEOUS
Qty: 3 ML | Refills: 0 | Status: SHIPPED | OUTPATIENT
Start: 2025-04-01

## 2025-04-25 ENCOUNTER — OFFICE VISIT (OUTPATIENT)
Dept: INTERNAL MEDICINE CLINIC | Facility: CLINIC | Age: 60
End: 2025-04-25
Payer: COMMERCIAL

## 2025-04-25 VITALS
OXYGEN SATURATION: 96 % | HEIGHT: 68 IN | TEMPERATURE: 97.1 F | DIASTOLIC BLOOD PRESSURE: 82 MMHG | WEIGHT: 171.9 LBS | HEART RATE: 56 BPM | SYSTOLIC BLOOD PRESSURE: 120 MMHG | BODY MASS INDEX: 26.05 KG/M2

## 2025-04-25 DIAGNOSIS — Z12.31 ENCOUNTER FOR SCREENING MAMMOGRAM FOR BREAST CANCER: ICD-10-CM

## 2025-04-25 DIAGNOSIS — R39.9 UTI SYMPTOMS: Primary | ICD-10-CM

## 2025-04-25 PROBLEM — R20.0 NUMBNESS AND TINGLING IN BOTH HANDS: Status: RESOLVED | Noted: 2023-05-01 | Resolved: 2025-04-25

## 2025-04-25 PROBLEM — R20.2 NUMBNESS AND TINGLING IN BOTH HANDS: Status: RESOLVED | Noted: 2023-05-01 | Resolved: 2025-04-25

## 2025-04-25 PROBLEM — M25.60 STIFFNESS OF MULTIPLE JOINTS: Status: RESOLVED | Noted: 2018-03-09 | Resolved: 2025-04-25

## 2025-04-25 PROBLEM — Z01.818 PREOPERATIVE CLEARANCE: Status: RESOLVED | Noted: 2023-06-07 | Resolved: 2025-04-25

## 2025-04-25 PROBLEM — G89.18 POST-OPERATIVE PAIN: Status: RESOLVED | Noted: 2023-06-16 | Resolved: 2025-04-25

## 2025-04-25 LAB
BILIRUB UR QL STRIP: NEGATIVE
CLARITY UR: CLEAR
COLOR UR: COLORLESS
GLUCOSE UR STRIP-MCNC: NEGATIVE MG/DL
HGB UR QL STRIP.AUTO: NEGATIVE
KETONES UR STRIP-MCNC: NEGATIVE MG/DL
LEUKOCYTE ESTERASE UR QL STRIP: NEGATIVE
NITRITE UR QL STRIP: NEGATIVE
PH UR STRIP.AUTO: 5.5 [PH]
PROT UR STRIP-MCNC: NEGATIVE MG/DL
SP GR UR STRIP.AUTO: 1 (ref 1–1.03)
UROBILINOGEN UR STRIP-ACNC: <2 MG/DL

## 2025-04-25 PROCEDURE — 87086 URINE CULTURE/COLONY COUNT: CPT | Performed by: NURSE PRACTITIONER

## 2025-04-25 PROCEDURE — 81003 URINALYSIS AUTO W/O SCOPE: CPT | Performed by: NURSE PRACTITIONER

## 2025-04-25 PROCEDURE — 99213 OFFICE O/P EST LOW 20 MIN: CPT | Performed by: NURSE PRACTITIONER

## 2025-04-25 RX ORDER — NITROFURANTOIN 25; 75 MG/1; MG/1
100 CAPSULE ORAL 2 TIMES DAILY
Qty: 10 CAPSULE | Refills: 0 | Status: SHIPPED | OUTPATIENT
Start: 2025-04-25 | End: 2025-04-30

## 2025-04-25 NOTE — ASSESSMENT & PLAN NOTE
Orders:    Urine culture; Future    UA (URINE) with reflex to Scope; Future    nitrofurantoin (MACROBID) 100 mg capsule; Take 1 capsule (100 mg total) by mouth 2 (two) times a day for 5 days    Will obtain urine and UA will start on Macrobid BID Will notify once urine is back.

## 2025-04-25 NOTE — PROGRESS NOTES
"Name: Bree Millan      : 1965      MRN: [id number]  Encounter Provider: MARIANN Stahl  Encounter Date: 2025   Encounter department: [facility] [place]  :  Assessment & Plan  UTI symptoms    Orders:    Urine culture; Future    UA (URINE) with reflex to Scope; Future    nitrofurantoin (MACROBID) 100 mg capsule; Take 1 capsule (100 mg total) by mouth 2 (two) times a day for 5 days    Will obtain urine and UA will start on Macrobid BID Will notify once urine is back.   Encounter for screening mammogram for breast cancer    Orders:    Mammo screening bilateral w 3d and cad; Future           History of Present Illness   Bree is for an acute visit. She is having Uti symptoms with pain and burning. She is using a new body wash and thinks that is why. She otherwise is doing well.       Review of Systems   Genitourinary:  Positive for dysuria, frequency and urgency.   All other systems reviewed and are negative.      Objective   /82 (BP Location: Left arm, Patient Position: Sitting, Cuff Size: Adult)   Pulse 56   Temp (!) 97.1 °F (36.2 °C) (Temporal)   Ht 5' 8\" (1.727 m)   Wt 78 kg (171 lb 14.4 oz)   SpO2 96%   BMI 26.14 kg/m²      Physical Exam  Vitals reviewed.   Constitutional:       Appearance: Normal appearance. She is normal weight.   Musculoskeletal:         General: Normal range of motion.   Skin:     General: Skin is warm and dry.      Capillary Refill: Capillary refill takes less than 2 seconds.   Neurological:      General: No focal deficit present.      Mental Status: She is alert and oriented to person, place, and time. Mental status is at baseline.   Psychiatric:         Mood and Affect: Mood normal.         Behavior: Behavior normal.         Thought Content: Thought content normal.         Judgment: Judgment normal.         "

## 2025-04-26 LAB — BACTERIA UR CULT: ABNORMAL

## 2025-05-02 RX ORDER — SEMAGLUTIDE 2.4 MG/.75ML
INJECTION, SOLUTION SUBCUTANEOUS
Qty: 3 ML | Refills: 0 | Status: SHIPPED | OUTPATIENT
Start: 2025-05-02

## 2025-05-06 ENCOUNTER — OFFICE VISIT (OUTPATIENT)
Dept: INTERNAL MEDICINE CLINIC | Facility: CLINIC | Age: 60
End: 2025-05-06
Payer: COMMERCIAL

## 2025-05-06 VITALS
HEIGHT: 68 IN | SYSTOLIC BLOOD PRESSURE: 110 MMHG | WEIGHT: 168.9 LBS | TEMPERATURE: 97.7 F | DIASTOLIC BLOOD PRESSURE: 80 MMHG | OXYGEN SATURATION: 93 % | BODY MASS INDEX: 25.6 KG/M2 | HEART RATE: 66 BPM

## 2025-05-06 DIAGNOSIS — D22.9 CHANGE IN MOLE: ICD-10-CM

## 2025-05-06 DIAGNOSIS — Z13.1 SCREENING FOR DIABETES MELLITUS: ICD-10-CM

## 2025-05-06 DIAGNOSIS — Z00.00 ANNUAL PHYSICAL EXAM: Primary | ICD-10-CM

## 2025-05-06 DIAGNOSIS — E78.2 MIXED HYPERLIPIDEMIA: ICD-10-CM

## 2025-05-06 DIAGNOSIS — E03.9 HYPOTHYROIDISM, UNSPECIFIED TYPE: ICD-10-CM

## 2025-05-06 PROBLEM — R39.9 UTI SYMPTOMS: Status: RESOLVED | Noted: 2025-04-25 | Resolved: 2025-05-06

## 2025-05-06 PROBLEM — Z20.822 EXPOSURE TO COVID-19 VIRUS: Status: RESOLVED | Noted: 2022-05-19 | Resolved: 2025-05-06

## 2025-05-06 PROCEDURE — 99396 PREV VISIT EST AGE 40-64: CPT | Performed by: NURSE PRACTITIONER

## 2025-05-06 NOTE — PROGRESS NOTES
Adult Annual Physical  Name: Bree Millan      : 1965      MRN: [id number]  Encounter Provider: MARIANN Stahl  Encounter Date: 2025   Encounter department: [facility] [place]    :  Assessment & Plan  Annual physical exam    Orders:    Comprehensive metabolic panel; Future    CBC and differential; Future    TSH, 3rd generation with Free T4 reflex; Future    Change in mole    Orders:    Ambulatory Referral to Dermatology; Future    Comprehensive metabolic panel; Future    CBC and differential; Future    TSH, 3rd generation with Free T4 reflex; Future    Mixed hyperlipidemia    Orders:    Comprehensive metabolic panel; Future    CBC and differential; Future    TSH, 3rd generation with Free T4 reflex; Future    Lipid panel; Future    Hypothyroidism, unspecified type    Orders:    Comprehensive metabolic panel; Future    CBC and differential; Future    TSH, 3rd generation with Free T4 reflex; Future    Screening for diabetes mellitus    Orders:    Hemoglobin A1C    Will repeat fasting labs. Up to date on screenings. Doing well on Wegovy no issues. Will refer to derm for a skin check. Will follow up in one year or sooner if need be.      Preventive Screenings:  - Diabetes Screening: screening up-to-date, risks/benefits discussed and orders placed  - Cholesterol Screening: has hyperlipidemia, risks/benefits discussed and orders placed   - Hepatitis C screening: screening up-to-date and risks/benefits discussed   - HIV screening: screening not indicated   - Cervical cancer screening: screening not indicated   - Breast cancer screening: risks/benefits discussed and screening up-to-date   - Colon cancer screening: screening up-to-date   - Lung cancer screening: screening not indicated          History of Present Illness     Adult Annual Physical:  Patient presents for annual physical. Bree is for a wellness. She is doing well but has a mole on her left upper back that is  "really bothering her. She would like to see derm. She has seen them in the past. She denies any chest pain, SOB, or palpitations. She is doing well on Wegovy. She is up to date on her screenings. She is due for labs. She offers no other issues..     Diet and Physical Activity:  - Diet/Nutrition: no special diet.  - Exercise: no formal exercise.    General Health:  - Sleep: 1-3 hours of sleep on average and sleeps poorly.  - Hearing: normal hearing bilateral ears.  - Vision: no vision problems.  - Dental: regular dental visits.    /GYN Health:  - Follows with GYN: no.     Review of Systems   All other systems reviewed and are negative.        Objective   /80 (BP Location: Left arm, Patient Position: Sitting, Cuff Size: Adult)   Pulse 66   Temp 97.7 °F (36.5 °C) (Temporal)   Ht 5' 8\" (1.727 m)   Wt 76.6 kg (168 lb 14.4 oz)   SpO2 93%   BMI 25.68 kg/m²     Physical Exam  Vitals reviewed.   Constitutional:       Appearance: Normal appearance. She is normal weight.   HENT:      Head: Normocephalic and atraumatic.      Right Ear: Tympanic membrane, ear canal and external ear normal.      Left Ear: Tympanic membrane, ear canal and external ear normal.      Nose: Nose normal.      Mouth/Throat:      Mouth: Mucous membranes are moist.      Pharynx: Oropharynx is clear.   Eyes:      Extraocular Movements: Extraocular movements intact.      Conjunctiva/sclera: Conjunctivae normal.      Pupils: Pupils are equal, round, and reactive to light.   Cardiovascular:      Rate and Rhythm: Normal rate and regular rhythm.      Pulses: Normal pulses.      Heart sounds: Normal heart sounds.   Pulmonary:      Effort: Pulmonary effort is normal.      Breath sounds: Normal breath sounds.   Abdominal:      General: Abdomen is flat. Bowel sounds are normal.      Palpations: Abdomen is soft.   Musculoskeletal:         General: Normal range of motion.   Skin:     General: Skin is warm and dry.      Capillary Refill: Capillary refill " takes less than 2 seconds.   Neurological:      General: No focal deficit present.      Mental Status: She is alert and oriented to person, place, and time. Mental status is at baseline.   Psychiatric:         Mood and Affect: Mood normal.         Behavior: Behavior normal.         Thought Content: Thought content normal.         Judgment: Judgment normal.

## 2025-05-06 NOTE — ASSESSMENT & PLAN NOTE
Orders:    Hemoglobin A1C    Will repeat fasting labs. Up to date on screenings. Doing well on Wegovy no issues. Will refer to derm for a skin check. Will follow up in one year or sooner if need be.

## 2025-05-06 NOTE — ASSESSMENT & PLAN NOTE
Orders:    Ambulatory Referral to Dermatology; Future    Comprehensive metabolic panel; Future    CBC and differential; Future    TSH, 3rd generation with Free T4 reflex; Future

## 2025-05-06 NOTE — PATIENT INSTRUCTIONS
"Patient Education     Routine physical for adults   The Basics   Written by the doctors and editors at [facility]   What is a physical? -- A physical is a routine visit, or \"check-up,\" with your doctor. You might also hear it called a \"wellness visit\" or \"preventive visit.\"  During each visit, the doctor will:   Ask about your physical and mental health   Ask about your habits, behaviors, and lifestyle   Do an exam   Give you vaccines if needed   Talk to you about any medicines you take   Give advice about your health   Answer your questions  Getting regular check-ups is an important part of taking care of your health. It can help your doctor find and treat any problems you have. But it's also important for preventing health problems.  A routine physical is different from a \"sick visit.\" A sick visit is when you see a doctor because of a health concern or problem. Since physicals are scheduled ahead of time, you can think about what you want to ask the doctor.  How often should I get a physical? -- It depends on your age and health. In general, for people age 21 years and older:   If you are younger than 50 years, you might be able to get a physical every 3 years.   If you are 50 years or older, your doctor might recommend a physical every year.  If you have an ongoing health condition, like diabetes or high blood pressure, your doctor will probably want to see you more often.  What happens during a physical? -- In general, each visit will include:   Physical exam - The doctor or nurse will check your height, weight, heart rate, and blood pressure. They will also look at your eyes and ears. They will ask about how you are feeling and whether you have any symptoms that bother you.   Medicines - It's a good idea to bring a list of all the medicines you take to each doctor visit. Your doctor will talk to you about your medicines and answer any questions. Tell them if you are having any side effects that bother you. You " "should also tell them if you are having trouble paying for any of your medicines.   Habits and behaviors - This includes:   Your diet   Your exercise habits   Whether you smoke, drink alcohol, or use drugs   Whether you are sexually active   Whether you feel safe at home  Your doctor will talk to you about things you can do to improve your health and lower your risk of health problems. They will also offer help and support. For example, if you want to quit smoking, they can give you advice and might prescribe medicines. If you want to improve your diet or get more physical activity, they can help you with this, too.   Lab tests, if needed - The tests you get will depend on your age and situation. For example, your doctor might want to check your:   Cholesterol   Blood sugar   Iron level   Vaccines - The recommended vaccines will depend on your age, health, and what vaccines you already had. Vaccines are very important because they can prevent certain serious or deadly infections.   Discussion of screening - \"Screening\" means checking for diseases or other health problems before they cause symptoms. Your doctor can recommend screening based on your age, risk, and preferences. This might include tests to check for:   Cancer, such as breast, prostate, cervical, ovarian, colorectal, prostate, lung, or skin cancer   Sexually transmitted infections, such as chlamydia and gonorrhea   Mental health conditions like depression and anxiety  Your doctor will talk to you about the different types of screening tests. They can help you decide which screenings to have. They can also explain what the results might mean.   Answering questions - The physical is a good time to ask the doctor or nurse questions about your health. If needed, they can refer you to other doctors or specialists, too.  Adults older than 65 years often need other care, too. As you get older, your doctor will talk to you about:   How to prevent falling at " home   Hearing or vision tests   Memory testing   How to take your medicines safely   Making sure that you have the help and support you need at home  All topics are updated as new evidence becomes available and our peer review process is complete.  This topic retrieved from [employer] on: May 02, 2024.  Topic 964465 Version 1.0  Release: 32.4.3 - C32.122  © 2024 UpToDate, Inc. and/or its affiliates. All rights reserved.  Consumer Information Use and Disclaimer   Disclaimer: This generalized information is a limited summary of diagnosis, treatment, and/or medication information. It is not meant to be comprehensive and should be used as a tool to help the user understand and/or assess potential diagnostic and treatment options. It does NOT include all information about conditions, treatments, medications, side effects, or risks that may apply to a specific patient. It is not intended to be medical advice or a substitute for the medical advice, diagnosis, or treatment of a health care provider based on the health care provider's examination and assessment of a patient's specific and unique circumstances. Patients must speak with a health care provider for complete information about their health, medical questions, and treatment options, including any risks or benefits regarding use of medications. This information does not endorse any treatments or medications as safe, effective, or approved for treating a specific patient. UpToDate, Inc. and its affiliates disclaim any warranty or liability relating to this information or the use thereof.The use of this information is governed by the Terms of Use, available at https://www.woltersgDineuwer.com/en/know/clinical-effectiveness-terms. 2024© UpToDate, Inc. and its affiliates and/or licensors. All rights reserved.  Copyright   © 2024 UpToDate, Inc. and/or its affiliates. All rights reserved.

## 2025-05-13 ENCOUNTER — HOSPITAL ENCOUNTER (OUTPATIENT)
Dept: MAMMOGRAPHY | Facility: HOSPITAL | Age: 60
Discharge: HOME | End: 2025-05-13
Attending: NURSE PRACTITIONER
Payer: COMMERCIAL

## 2025-05-13 VITALS — HEIGHT: 68 IN | BODY MASS INDEX: 25.46 KG/M2 | WEIGHT: 168 LBS

## 2025-05-13 DIAGNOSIS — Z12.31 ENCOUNTER FOR SCREENING MAMMOGRAM FOR BREAST CANCER: ICD-10-CM

## 2025-05-13 PROCEDURE — 77067 SCR MAMMO BI INCL CAD: CPT

## 2025-05-13 PROCEDURE — 77063 BREAST TOMOSYNTHESIS BI: CPT

## 2025-05-18 ENCOUNTER — APPOINTMENT (OUTPATIENT)
Dept: LAB | Facility: HOSPITAL | Age: 60
End: 2025-05-18
Payer: COMMERCIAL

## 2025-05-18 DIAGNOSIS — Z00.00 ANNUAL PHYSICAL EXAM: ICD-10-CM

## 2025-05-18 DIAGNOSIS — E03.9 HYPOTHYROIDISM, UNSPECIFIED TYPE: ICD-10-CM

## 2025-05-18 DIAGNOSIS — E78.2 MIXED HYPERLIPIDEMIA: ICD-10-CM

## 2025-05-18 DIAGNOSIS — D22.9 CHANGE IN MOLE: ICD-10-CM

## 2025-05-18 LAB
ALBUMIN SERPL BCG-MCNC: 4.3 G/DL (ref 3.5–5)
ALP SERPL-CCNC: 52 U/L (ref 34–104)
ALT SERPL W P-5'-P-CCNC: 13 U/L (ref 7–52)
ANION GAP SERPL CALCULATED.3IONS-SCNC: 7 MMOL/L (ref 4–13)
AST SERPL W P-5'-P-CCNC: 15 U/L (ref 13–39)
BASOPHILS # BLD AUTO: 0.06 THOUSANDS/ÂΜL (ref 0–0.1)
BASOPHILS NFR BLD AUTO: 1 % (ref 0–1)
BILIRUB SERPL-MCNC: 0.64 MG/DL (ref 0.2–1)
BUN SERPL-MCNC: 7 MG/DL (ref 5–25)
CALCIUM SERPL-MCNC: 9.8 MG/DL (ref 8.4–10.2)
CHLORIDE SERPL-SCNC: 103 MMOL/L (ref 96–108)
CHOLEST SERPL-MCNC: 153 MG/DL (ref ?–200)
CO2 SERPL-SCNC: 27 MMOL/L (ref 21–32)
CREAT SERPL-MCNC: 0.79 MG/DL (ref 0.6–1.3)
EOSINOPHIL # BLD AUTO: 0.29 THOUSAND/ÂΜL (ref 0–0.61)
EOSINOPHIL NFR BLD AUTO: 5 % (ref 0–6)
ERYTHROCYTE [DISTWIDTH] IN BLOOD BY AUTOMATED COUNT: 13.2 % (ref 11.6–15.1)
EST. AVERAGE GLUCOSE BLD GHB EST-MCNC: 105 MG/DL
GFR SERPL CREATININE-BSD FRML MDRD: 81 ML/MIN/1.73SQ M
GLUCOSE P FAST SERPL-MCNC: 78 MG/DL (ref 65–99)
HBA1C MFR BLD: 5.3 %
HCT VFR BLD AUTO: 44.6 % (ref 34.8–46.1)
HDLC SERPL-MCNC: 53 MG/DL
HGB BLD-MCNC: 14.9 G/DL (ref 11.5–15.4)
IMM GRANULOCYTES # BLD AUTO: 0.01 THOUSAND/UL (ref 0–0.2)
IMM GRANULOCYTES NFR BLD AUTO: 0 % (ref 0–2)
LDLC SERPL CALC-MCNC: 85 MG/DL (ref 0–100)
LYMPHOCYTES # BLD AUTO: 2.08 THOUSANDS/ÂΜL (ref 0.6–4.47)
LYMPHOCYTES NFR BLD AUTO: 33 % (ref 14–44)
MCH RBC QN AUTO: 30.1 PG (ref 26.8–34.3)
MCHC RBC AUTO-ENTMCNC: 33.4 G/DL (ref 31.4–37.4)
MCV RBC AUTO: 90 FL (ref 82–98)
MONOCYTES # BLD AUTO: 0.46 THOUSAND/ÂΜL (ref 0.17–1.22)
MONOCYTES NFR BLD AUTO: 7 % (ref 4–12)
NEUTROPHILS # BLD AUTO: 3.49 THOUSANDS/ÂΜL (ref 1.85–7.62)
NEUTS SEG NFR BLD AUTO: 54 % (ref 43–75)
NONHDLC SERPL-MCNC: 100 MG/DL
NRBC BLD AUTO-RTO: 0 /100 WBCS
PLATELET # BLD AUTO: 392 THOUSANDS/UL (ref 149–390)
PMV BLD AUTO: 9.7 FL (ref 8.9–12.7)
POTASSIUM SERPL-SCNC: 4.3 MMOL/L (ref 3.5–5.3)
PROT SERPL-MCNC: 6.3 G/DL (ref 6.4–8.4)
RBC # BLD AUTO: 4.95 MILLION/UL (ref 3.81–5.12)
SODIUM SERPL-SCNC: 137 MMOL/L (ref 135–147)
T4 FREE SERPL-MCNC: 1.37 NG/DL (ref 0.61–1.12)
TRIGL SERPL-MCNC: 76 MG/DL (ref ?–150)
TSH SERPL DL<=0.05 MIU/L-ACNC: 0.21 UIU/ML (ref 0.45–4.5)
WBC # BLD AUTO: 6.39 THOUSAND/UL (ref 4.31–10.16)

## 2025-05-18 PROCEDURE — 85025 COMPLETE CBC W/AUTO DIFF WBC: CPT

## 2025-05-18 PROCEDURE — 83036 HEMOGLOBIN GLYCOSYLATED A1C: CPT | Performed by: NURSE PRACTITIONER

## 2025-05-18 PROCEDURE — 84439 ASSAY OF FREE THYROXINE: CPT

## 2025-05-18 PROCEDURE — 36415 COLL VENOUS BLD VENIPUNCTURE: CPT

## 2025-05-18 PROCEDURE — 80061 LIPID PANEL: CPT

## 2025-05-18 PROCEDURE — 84443 ASSAY THYROID STIM HORMONE: CPT

## 2025-05-18 PROCEDURE — 80053 COMPREHEN METABOLIC PANEL: CPT

## 2025-05-29 RX ORDER — SEMAGLUTIDE 2.4 MG/.75ML
INJECTION, SOLUTION SUBCUTANEOUS
Qty: 3 ML | Refills: 3 | Status: SHIPPED | OUTPATIENT
Start: 2025-05-29

## 2025-06-03 ENCOUNTER — TELEPHONE (OUTPATIENT)
Age: 60
End: 2025-06-03

## 2025-06-03 NOTE — TELEPHONE ENCOUNTER
Prior Authorization requested for Wegovy 2.4 mg. Your patient is due for a follow up visit for medication management and weight check. Patient's last office visit was 05/13/2024. Once visit is completed please send message back to the prior authorization POD so a prior authorization can be submitted. Thank you

## 2025-06-03 NOTE — TELEPHONE ENCOUNTER
Patient called for update on prescription. Advised that it was sent to [facility] on 5/29 approved with a receipt of them receiving it. She is going to call pharmacy and call back with any updates.

## 2025-06-05 PROBLEM — Z13.1 SCREENING FOR DIABETES MELLITUS: Status: RESOLVED | Noted: 2025-05-06 | Resolved: 2025-06-05

## 2025-06-05 NOTE — TELEPHONE ENCOUNTER
PA for Wegovy) 2.4 MG/0.75ML SUBMITTED to     via    []CMM-KEY:   [x]Surescripts-Case ID # [id number]   []Availity-Auth ID # NDC #   []Faxed to plan   []Other website   []Phone call Case ID #     [x]PA sent as URGENT    All office notes, labs and other pertaining documents and studies sent. Clinical questions answered. Awaiting determination from insurance company.     Turnaround time for your insurance to make a decision on your Prior Authorization can take 7-21 business days.

## 2025-06-09 NOTE — TELEPHONE ENCOUNTER
PA for Wegovy) 2.4 MG/0.75ML  APPROVED     Date(s) approved June 5, 2025 to June 5, 2026     Case #[id number]     Patient advised by          [x]MyChart Message  []Phone call   []LMOM  [x]L/M to call office as no active Communication consent on file  []Unable to leave detailed message as VM not approved on Communication consent       Pharmacy advised by    [x]Fax  []Phone call  []Secure Chat    Specialty Pharmacy    []     Approval letter scanned into Media No Not available at decision

## 2025-07-10 RX ORDER — SEMAGLUTIDE 2.4 MG/.75ML
INJECTION, SOLUTION SUBCUTANEOUS
Qty: 3 ML | Refills: 0 | Status: SHIPPED | OUTPATIENT
Start: 2025-07-10

## 2025-07-27 DIAGNOSIS — E89.0 POSTOPERATIVE HYPOTHYROIDISM: ICD-10-CM

## 2025-07-29 RX ORDER — LEVOTHYROXINE SODIUM 100 MCG
100 TABLET ORAL DAILY
Qty: 90 TABLET | Refills: 1 | Status: SHIPPED | OUTPATIENT
Start: 2025-07-29

## 2025-08-07 RX ORDER — SEMAGLUTIDE 2.4 MG/.75ML
INJECTION, SOLUTION SUBCUTANEOUS
Qty: 3 ML | Refills: 0 | Status: SHIPPED | OUTPATIENT
Start: 2025-08-07

## (undated) DEVICE — SUT VICRYL 3-0 SH 27 IN J416H

## (undated) DEVICE — CURITY STRETCH BANDAGE: Brand: CURITY

## (undated) DEVICE — GLOVE PI ULTRA TOUCH SZ.8.5

## (undated) DEVICE — HALF SHEET: Brand: CONVERTORS

## (undated) DEVICE — STERILE BETHLEHEM PLASTIC HAND: Brand: CARDINAL HEALTH

## (undated) DEVICE — GLOVE INDICATOR PI UNDERGLOVE SZ 8 BLUE

## (undated) DEVICE — BLADE SAGITTAL 5.5 X 18.5MM 0.4MMTHCK

## (undated) DEVICE — CURITY NON-ADHERENT STRIPS: Brand: CURITY

## (undated) DEVICE — CHLORAPREP HI-LITE 10.5ML ORANGE

## (undated) DEVICE — SCD SEQUENTIAL COMPRESSION COMFORT SLEEVE MEDIUM KNEE LENGTH: Brand: KENDALL SCD

## (undated) DEVICE — CAST PADDING 4 IN SYNTHETIC NON-STRL

## (undated) DEVICE — GLOVE INDICATOR PI UNDERGLOVE SZ 7 BLUE

## (undated) DEVICE — GAUZE SPONGES,16 PLY: Brand: CURITY

## (undated) DEVICE — GLOVE PI ULTRA TOUCH SZ.7.5

## (undated) DEVICE — 10FR FRAZIER SUCTION HANDLE: Brand: CARDINAL HEALTH

## (undated) DEVICE — BLADE MINI RND TIP ONE SIDE SHARP

## (undated) DEVICE — GLOVE INDICATOR PI UNDERGLOVE SZ 8.5 BLUE

## (undated) DEVICE — BLADE BEAVER 6900

## (undated) DEVICE — FLEXIBLE ADHESIVE BANDAGE,X-LARGE: Brand: CURITY

## (undated) DEVICE — BETHLEHEM UNIVERSAL  MIONR EXT: Brand: CARDINAL HEALTH

## (undated) DEVICE — SUT ETHILON 4-0 PS-2 18 IN 1667H

## (undated) DEVICE — SURGIFOAM 8.5 X 12.5

## (undated) DEVICE — OCCLUSIVE GAUZE STRIP,3% BISMUTH TRIBROMOPHENATE IN PETROLATUM BLEND: Brand: XEROFORM

## (undated) DEVICE — ARM SLING: Brand: DEROYAL

## (undated) DEVICE — SUT PROLENE 4-0 PS-2 18 IN 8682G

## (undated) DEVICE — SUT FIBERWIRE #2 1/2 CIRCLE T-5 38IN AR-7200

## (undated) DEVICE — GLOVE PI ULTRA TOUCH SZ.8.0

## (undated) DEVICE — SYRINGE 5ML LL

## (undated) DEVICE — PREP PAD BNS: Brand: CONVERTORS

## (undated) DEVICE — 4-PORT MANIFOLD: Brand: NEPTUNE 2

## (undated) DEVICE — PADDING CAST 4 IN  COTTON STRL

## (undated) DEVICE — CUFF TOURNIQUET 18 X 4 IN QUICK CONNECT DISP 1 BLADDER

## (undated) DEVICE — CHLORAPREP HI-LITE 26ML ORANGE

## (undated) DEVICE — ABDOMINAL PAD: Brand: DERMACEA

## (undated) DEVICE — WET SKIN PREP TRAY: Brand: MEDLINE INDUSTRIES, INC.

## (undated) DEVICE — ACE WRAP 3 IN STERILE

## (undated) DEVICE — ZIMMER® STERILE DISPOSABLE TOURNIQUET CUFF, DUAL PORT, SINGLE BLADDER, 18 IN. (46 CM)

## (undated) DEVICE — STRETCH BANDAGE: Brand: CURITY

## (undated) DEVICE — STRL COTTON TIP APPLCTR 6IN PK: Brand: CARDINAL HEALTH

## (undated) DEVICE — PLUMEPEN PRO 10FT

## (undated) DEVICE — RETROGRADE KNIFE BOX OF 6: Brand: ECTRA

## (undated) DEVICE — NEEDLE 25G X 1 1/2

## (undated) DEVICE — NEEDLE SPINAL 22G X 5IN QUINCKE

## (undated) DEVICE — INTENDED FOR TISSUE SEPARATION, AND OTHER PROCEDURES THAT REQUIRE A SHARP SURGICAL BLADE TO PUNCTURE OR CUT.: Brand: BARD-PARKER ® CARBON RIB-BACK BLADES